# Patient Record
Sex: MALE | Race: WHITE | Employment: FULL TIME | URBAN - NONMETROPOLITAN AREA
[De-identification: names, ages, dates, MRNs, and addresses within clinical notes are randomized per-mention and may not be internally consistent; named-entity substitution may affect disease eponyms.]

---

## 2017-09-06 ENCOUNTER — OFFICE VISIT (OUTPATIENT)
Dept: FAMILY MEDICINE | Facility: OTHER | Age: 47
End: 2017-09-06
Payer: COMMERCIAL

## 2017-09-06 VITALS
DIASTOLIC BLOOD PRESSURE: 90 MMHG | TEMPERATURE: 96.8 F | BODY MASS INDEX: 31.33 KG/M2 | SYSTOLIC BLOOD PRESSURE: 150 MMHG | HEART RATE: 94 BPM | HEIGHT: 75 IN | WEIGHT: 252 LBS | OXYGEN SATURATION: 98 %

## 2017-09-06 DIAGNOSIS — I10 BENIGN ESSENTIAL HYPERTENSION: ICD-10-CM

## 2017-09-06 DIAGNOSIS — L64.9 MALE PATTERN BALDNESS: Primary | ICD-10-CM

## 2017-09-06 DIAGNOSIS — F41.9 ANXIETY: ICD-10-CM

## 2017-09-06 PROCEDURE — 99203 OFFICE O/P NEW LOW 30 MIN: CPT | Performed by: INTERNAL MEDICINE

## 2017-09-06 RX ORDER — LORAZEPAM 0.5 MG/1
1 TABLET ORAL EVERY 8 HOURS PRN
Qty: 60 TABLET | Refills: 0 | Status: SHIPPED | OUTPATIENT
Start: 2017-09-06 | End: 2018-12-14

## 2017-09-06 RX ORDER — LISINOPRIL AND HYDROCHLOROTHIAZIDE 12.5; 2 MG/1; MG/1
1 TABLET ORAL DAILY
Qty: 30 TABLET | Refills: 3 | Status: SHIPPED | OUTPATIENT
Start: 2017-09-06 | End: 2018-03-20

## 2017-09-06 RX ORDER — FINASTERIDE 5 MG/1
1.25 TABLET, FILM COATED ORAL DAILY
COMMUNITY
End: 2017-09-06

## 2017-09-06 RX ORDER — FINASTERIDE 5 MG/1
TABLET, FILM COATED ORAL
Qty: 30 TABLET | Refills: 0 | Status: SHIPPED | OUTPATIENT
Start: 2017-09-06 | End: 2018-06-11

## 2017-09-06 RX ORDER — LISINOPRIL AND HYDROCHLOROTHIAZIDE 12.5; 2 MG/1; MG/1
1 TABLET ORAL DAILY
COMMUNITY
End: 2017-09-06

## 2017-09-06 ASSESSMENT — ANXIETY QUESTIONNAIRES
7. FEELING AFRAID AS IF SOMETHING AWFUL MIGHT HAPPEN: SEVERAL DAYS
2. NOT BEING ABLE TO STOP OR CONTROL WORRYING: SEVERAL DAYS
1. FEELING NERVOUS, ANXIOUS, OR ON EDGE: SEVERAL DAYS
6. BECOMING EASILY ANNOYED OR IRRITABLE: NOT AT ALL
3. WORRYING TOO MUCH ABOUT DIFFERENT THINGS: SEVERAL DAYS
IF YOU CHECKED OFF ANY PROBLEMS ON THIS QUESTIONNAIRE, HOW DIFFICULT HAVE THESE PROBLEMS MADE IT FOR YOU TO DO YOUR WORK, TAKE CARE OF THINGS AT HOME, OR GET ALONG WITH OTHER PEOPLE: SOMEWHAT DIFFICULT
GAD7 TOTAL SCORE: 4
5. BEING SO RESTLESS THAT IT IS HARD TO SIT STILL: NOT AT ALL

## 2017-09-06 ASSESSMENT — PAIN SCALES - GENERAL: PAINLEVEL: NO PAIN (0)

## 2017-09-06 ASSESSMENT — PATIENT HEALTH QUESTIONNAIRE - PHQ9: 5. POOR APPETITE OR OVEREATING: NOT AT ALL

## 2017-09-06 NOTE — MR AVS SNAPSHOT
"              After Visit Summary   2017    Victor M Mao    MRN: [id number]           Patient Information     Date Of Birth          1970        Visit Information        Provider Department      2017 1:20 PM Lee Chowdhury DO [facility]        Today's Diagnoses     Male pattern baldness    -  1    Benign essential hypertension        Anxiety           Follow-ups after your visit        Who to contact     If you have questions or need follow up information about today's clinic visit or your schedule please contact [facility] directly at [phone].  Normal or non-critical lab and imaging results will be communicated to you by Shakehart, letter or phone within 4 business days after the clinic has received the results. If you do not hear from us within 7 days, please contact the clinic through Stemt or phone. If you have a critical or abnormal lab result, we will notify you by phone as soon as possible.  Submit refill requests through [employer] or call your pharmacy and they will forward the refill request to us. Please allow 3 business days for your refill to be completed.          Additional Information About Your Visit        MyChart Information     [employer] lets you send messages to your doctor, view your test results, renew your prescriptions, schedule appointments and more. To sign up, go to [email or web address] . Click on \"Log in\" on the left side of the screen, which will take you to the Welcome page. Then click on \"Sign up Now\" on the right side of the page.     You will be asked to enter the access code listed below, as well as some personal information. Please follow the directions to create your username and password.     Your access code is: [email or web address]  Expires: 2017 10:29 PM     Your access code will  in 90 days. If you need help or a new code, please call your [place] clinic or [phone].        Care EveryWhere ID     This is your " "Care EveryWhere ID. This could be used by other organizations to access your [facility] medical records  [id number]        Your Vitals Were     Pulse Temperature Height Pulse Oximetry BMI (Body Mass Index)       94 96.8  F (36  C) (Temporal) 6' 3\" (1.905 m) 98% 31.5 kg/m2        Blood Pressure from Last 3 Encounters:   09/06/17 150/90    Weight from Last 3 Encounters:   09/06/17 252 lb (114.3 kg)              Today, you had the following     No orders found for display         Today's Medication Changes          These changes are accurate as of: 9/6/17 11:59 PM.  If you have any questions, ask your nurse or doctor.               These medicines have changed or have updated prescriptions.        Dose/Directions    finasteride 5 MG tablet   Commonly known as:  PROSCAR   This may have changed:    - how much to take  - how to take this  - when to take this  - additional instructions   Used for:  Male pattern baldness   Changed by:  Lee Chowdhury DO        Take 1/4 pill daily   Quantity:  30 tablet   Refills:  0       LORazepam 0.5 MG tablet   Commonly known as:  ATIVAN   Indication:  Feeling Anxious   This may have changed:  medication strength   Used for:  Anxiety   Changed by:  Lee Chowdhury DO        Dose:  1 mg   Take 2 tablets (1 mg) by mouth every 8 hours as needed for anxiety   Quantity:  60 tablet   Refills:  0            Where to get your medicines      These medications were sent to [id number] - [facility], [place] - [address] [facility]  [address] [facility] [address]     Phone:  [phone]     finasteride 5 MG tablet    lisinopril-hydrochlorothiazide 20-12.5 MG per tablet         Some of these will need a paper prescription and others can be bought over the counter.  Ask your nurse if you have questions.     Bring a paper prescription for each of these medications     LORazepam 0.5 MG tablet                Primary Care Provider    None Specified       No primary provider on " file.        Equal Access to Services     [facility] : Hadii aad ku hadruthamos Alexandraali, waeddieda forrestrebaha, qajenniferjaneth goodenvivianasimba cormier, brian washburn. So [place] [phone].    ATENCIÓN: Si habla español, tiene a zee disposición servicios gratuitos de asistencia lingüística. Negritoame al [phone].    We comply with applicable federal civil rights laws and [place] laws. We do not discriminate on the basis of race, color, national origin, age, disability sex, sexual orientation or gender identity.            Thank you!     Thank you for choosing [facility]  for your care. Our goal is always to provide you with excellent care. Hearing back from our patients is one way we can continue to improve our services. Please take a few minutes to complete the written survey that you may receive in the mail after your visit with us. Thank you!             Your Updated Medication List - Protect others around you: Learn how to safely use, store and throw away your medicines at www.disposemymeds.org.          This list is accurate as of: 9/6/17 11:59 PM.  Always use your most recent med list.                   Brand Name Dispense Instructions for use Diagnosis    CITALOPRAM HYDROBROMIDE PO      Take 40 mg by mouth daily        finasteride 5 MG tablet    PROSCAR    30 tablet    Take 1/4 pill daily    Male pattern baldness       lisinopril-hydrochlorothiazide 20-12.5 MG per tablet    PRINZIDE/ZESTORETIC    30 tablet    Take 1 tablet by mouth daily    Benign essential hypertension       LORazepam 0.5 MG tablet    ATIVAN    60 tablet    Take 2 tablets (1 mg) by mouth every 8 hours as needed for anxiety    Anxiety

## 2017-09-06 NOTE — NURSING NOTE
"Chief Complaint   Patient presents with     Establish Care       Initial /90 (BP Location: Left arm, Patient Position: Chair, Cuff Size: Adult Regular)  Pulse 94  Temp 96.8  F (36  C) (Temporal)  Ht 6' 3\" (1.905 m)  Wt 252 lb (114.3 kg)  SpO2 98%  BMI 31.5 kg/m2 Estimated body mass index is 31.5 kg/(m^2) as calculated from the following:    Height as of this encounter: 6' 3\" (1.905 m).    Weight as of this encounter: 252 lb (114.3 kg).  Medication Reconciliation: complete  Peggy MELGRA    "
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
## 2017-09-07 ASSESSMENT — ANXIETY QUESTIONNAIRES: GAD7 TOTAL SCORE: 4

## 2017-10-30 PROBLEM — F41.9 ANXIETY: Status: ACTIVE | Noted: 2017-10-30

## 2017-10-30 PROBLEM — I10 BENIGN ESSENTIAL HYPERTENSION: Status: ACTIVE | Noted: 2017-10-30

## 2017-12-27 DIAGNOSIS — F33.41 RECURRENT MAJOR DEPRESSIVE DISORDER, IN PARTIAL REMISSION (H): Primary | ICD-10-CM

## 2017-12-27 RX ORDER — CITALOPRAM HYDROBROMIDE 10 MG/1
40 TABLET ORAL DAILY
Qty: 60 TABLET | Refills: 0 | Status: CANCELLED | OUTPATIENT
Start: 2017-12-27

## 2017-12-27 NOTE — TELEPHONE ENCOUNTER
Requested Prescriptions   Pending Prescriptions Disp Refills     citalopram (CELEXA) 10 MG tablet 60 tablet 0     Sig: Take 4 tablets (40 mg) by mouth daily    SSRIs Protocol Passed    12/27/2017  3:19 PM       Passed - Recent or future visit with authorizing provider    Patient had office visit in the last year or has a visit in the next 30 days with authorizing provider.  See chart review.              Passed - Patient is age 18 or older        ** This has never been prescribed for pt at a  clinic, it was a reported medication**

## 2017-12-28 RX ORDER — CITALOPRAM HYDROBROMIDE 40 MG/1
40 TABLET ORAL DAILY
Qty: 30 TABLET | Refills: 1 | Status: SHIPPED | OUTPATIENT
Start: 2017-12-28 | End: 2018-05-07

## 2017-12-28 NOTE — TELEPHONE ENCOUNTER
Routing refill request to provider for review/approval because:  Medication is reported/historical    Anne Crawley, RN  [facility]

## 2018-01-05 ENCOUNTER — OFFICE VISIT (OUTPATIENT)
Dept: FAMILY MEDICINE | Facility: CLINIC | Age: 48
End: 2018-01-05
Payer: COMMERCIAL

## 2018-01-05 VITALS
SYSTOLIC BLOOD PRESSURE: 138 MMHG | HEART RATE: 78 BPM | TEMPERATURE: 98.2 F | OXYGEN SATURATION: 100 % | BODY MASS INDEX: 33.61 KG/M2 | WEIGHT: 268.9 LBS | DIASTOLIC BLOOD PRESSURE: 86 MMHG

## 2018-01-05 DIAGNOSIS — B34.9 VIRAL SYNDROME: ICD-10-CM

## 2018-01-05 DIAGNOSIS — R11.2 NON-INTRACTABLE VOMITING WITH NAUSEA, UNSPECIFIED VOMITING TYPE: Primary | ICD-10-CM

## 2018-01-05 DIAGNOSIS — R05.9 COUGH: ICD-10-CM

## 2018-01-05 LAB
BASOPHILS # BLD AUTO: 0 10E9/L (ref 0–0.2)
BASOPHILS NFR BLD AUTO: 0.4 %
DIFFERENTIAL METHOD BLD: NORMAL
EOSINOPHIL # BLD AUTO: 0.1 10E9/L (ref 0–0.7)
EOSINOPHIL NFR BLD AUTO: 1.4 %
FLUAV+FLUBV AG SPEC QL: NEGATIVE
FLUAV+FLUBV AG SPEC QL: NEGATIVE
IMM GRANULOCYTES # BLD: 0 10E9/L (ref 0–0.4)
IMM GRANULOCYTES NFR BLD: 0.4 %
LYMPHOCYTES # BLD AUTO: 1.8 10E9/L (ref 0.8–5.3)
LYMPHOCYTES NFR BLD AUTO: 36.8 %
MONOCYTES # BLD AUTO: 0.4 10E9/L (ref 0–1.3)
MONOCYTES NFR BLD AUTO: 8.9 %
NEUTROPHILS # BLD AUTO: 2.6 10E9/L (ref 1.6–8.3)
NEUTROPHILS NFR BLD AUTO: 52.1 %
SPECIMEN SOURCE: NORMAL
WBC # BLD AUTO: 4.9 10E9/L (ref 4–11)

## 2018-01-05 PROCEDURE — 85004 AUTOMATED DIFF WBC COUNT: CPT | Performed by: NURSE PRACTITIONER

## 2018-01-05 PROCEDURE — 99213 OFFICE O/P EST LOW 20 MIN: CPT | Performed by: NURSE PRACTITIONER

## 2018-01-05 PROCEDURE — 85048 AUTOMATED LEUKOCYTE COUNT: CPT | Performed by: NURSE PRACTITIONER

## 2018-01-05 PROCEDURE — 36415 COLL VENOUS BLD VENIPUNCTURE: CPT | Performed by: NURSE PRACTITIONER

## 2018-01-05 PROCEDURE — 87804 INFLUENZA ASSAY W/OPTIC: CPT | Performed by: NURSE PRACTITIONER

## 2018-01-05 NOTE — MR AVS SNAPSHOT
"              After Visit Summary   1/5/2018    Victor M Mao    MRN: [id number]           Patient Information     Date Of Birth          1970        Visit Information        Provider Department      1/5/2018 2:00 PM Lucia Floyd APRN [facility]        Today's Diagnoses     Non-intractable vomiting with nausea, unspecified vomiting type    -  1    Cough        Viral syndrome          Care Instructions      Viral Syndrome (Adult)  A viral illness may cause a number of symptoms. The symptoms depend on the part of the body that the virus affects. If it settles in your nose, throat, and lungs, it may cause cough, sore throat, congestion, and sometimes headache. If it settles in your stomach and intestinal tract, it may cause vomiting and diarrhea. Sometimes it causes vague symptoms like \"aching all over,\" feeling tired, loss of appetite, or fever.  A viral illness usually lasts 1 to 2 weeks, but sometimes it lasts longer. In some cases, a more serious infection can look like a viral syndrome in the first few days of the illness. You may need another exam and additional tests to know the difference. Watch for the warning signs listed below.  Home care  Follow these guidelines for taking care of yourself at home:    If symptoms are severe, rest at home for the first 2 to 3 days.    Stay away from cigarette smoke - both your smoke and the smoke from others.    You may use over-the-counter acetaminophen or ibuprofen for fever, muscle aching, and headache, unless another medicine was prescribed for this. If you have chronic liver or kidney disease or ever had a stomach ulcer or GI bleeding, talk with your doctor before using these medicines. No one who is younger than 18 and ill with a fever should take aspirin. It may cause severe disease or death.    Your appetite may be poor, so a light diet is fine. Avoid dehydration by drinking 8 to 12 8-ounce glasses of fluids each day. This may include " water; orange juice; lemonade; apple, grape, and cranberry juice; clear fruit drinks; electrolyte replacement and sports drinks; and decaffeinated teas and coffee. If you have been diagnosed with a kidney disease, ask your doctor how much and what types of fluids you should drink to prevent dehydration. If you have kidney disease, drinking too much fluid can cause it build up in the your body and be dangerous to your health.    Over-the-counter remedies won't shorten the length of the illness but may be helpful for cough, sore throat; and nasal and sinus congestion. Don't use decongestants if you have high blood pressure.  Follow-up care  Follow up with your healthcare provider if you do not improve over the next week.  Call 911  Get emergency medical care if any of the following occur:    Convulsion    Feeling weak, dizzy, or like you are going to faint    Chest pain, shortness of breath, wheezing, or difficulty breathing  When to seek medical advice  Call your healthcare provider right away if any of these occur:    Cough with lots of colored sputum (mucus) or blood in your sputum    Chest pain, shortness of breath, wheezing, or difficulty breathing    Severe headache; face, neck, or ear pain    Severe, constant pain in the lower right side of your belly (abdominal)    Continued vomiting (can t keep liquids down)    Frequent diarrhea (more than 5 times a day); blood (red or black color) or mucus in diarrhea    Feeling weak, dizzy, or like you are going to faint    Extreme thirst    Fever of 100.4 F (38 C) or higher, or as directed by your healthcare provider  Date Last Reviewed: 9/25/2015 2000-2017 [employer]. [address] [place] [address], [place] [address]. All rights reserved. This information is not intended as a substitute for professional medical care. Always follow your healthcare professional's instructions.                Follow-ups after your visit        Who to contact     If you have  "questions or need follow up information about today's clinic visit or your schedule please contact [facility] directly at [phone].  Normal or non-critical lab and imaging results will be communicated to you by Mobile [employer] Casehart, letter or phone within 4 business days after the clinic has received the results. If you do not hear from us within 7 days, please contact the clinic through [employer] Case[employer] or phone. If you have a critical or abnormal lab result, we will notify you by phone as soon as possible.  Submit refill requests through [employer] or call your pharmacy and they will forward the refill request to us. Please allow 3 business days for your refill to be completed.          Additional Information About Your Visit        [employer] Information     [employer] lets you send messages to your doctor, view your test results, renew your prescriptions, schedule appointments and more. To sign up, go to [email or web address] . Click on \"Log in\" on the left side of the screen, which will take you to the Welcome page. Then click on \"Sign up Now\" on the right side of the page.     You will be asked to enter the access code listed below, as well as some personal information. Please follow the directions to create your username and password.     Your access code is: [email or web address]  Expires: 2018  2:38 PM     Your access code will  in 90 days. If you need help or a new code, please call your [facility] clinic or [phone].        Care EveryWhere ID     This is your Care EveryWhere ID. This could be used by other organizations to access your [facility] medical records  [id number]        Your Vitals Were     Pulse Temperature Pulse Oximetry BMI (Body Mass Index)          78 98.2  F (36.8  C) (Tympanic) 100% 33.61 kg/m2         Blood Pressure from Last 3 Encounters:   18 138/86   17 150/90    Weight from Last 3 Encounters:   18 268 lb 14.4 oz (122 kg)   17 252 lb (114.3 kg)              We " Performed the Following     Influenza A/B antigen     WBC with Diff        Primary Care Provider Fax #    Physician No Ref-Primary [phone]       No address on file        Equal Access to Services     JOHNNA QUIÑONES : Hadii aad ku hadruthamos Sauer, siminsimba claytonrebaha, adilene cormier, brian goodrich laalexandriajanis washburn. So [facility] [phone].    ATENCIÓN: Si habla español, tiene a zee disposición servicios gratuitos de asistencia lingüística. Llame al [phone].    We comply with applicable federal civil rights laws and [place] laws. We do not discriminate on the basis of race, color, national origin, age, disability, sex, sexual orientation, or gender identity.            Thank you!     Thank you for choosing [facility]  for your care. Our goal is always to provide you with excellent care. Hearing back from our patients is one way we can continue to improve our services. Please take a few minutes to complete the written survey that you may receive in the mail after your visit with us. Thank you!             Your Updated Medication List - Protect others around you: Learn how to safely use, store and throw away your medicines at www.disposemymeds.org.          This list is accurate as of: 1/5/18  2:38 PM.  Always use your most recent med list.                   Brand Name Dispense Instructions for use Diagnosis    citalopram 40 MG tablet    celeXA    30 tablet    Take 1 tablet (40 mg) by mouth daily    Recurrent major depressive disorder, in partial remission (H)       finasteride 5 MG tablet    PROSCAR    30 tablet    Take 1/4 pill daily    Male pattern baldness       lisinopril-hydrochlorothiazide 20-12.5 MG per tablet    PRINZIDE/ZESTORETIC    30 tablet    Take 1 tablet by mouth daily    Benign essential hypertension       LORazepam 0.5 MG tablet    ATIVAN    60 tablet    Take 2 tablets (1 mg) by mouth every 8 hours as needed for anxiety    Anxiety

## 2018-01-05 NOTE — PROGRESS NOTES
SUBJECTIVE:   Victor M Mao is a 47 year old male who presents to clinic today for the following health issues:      Acute Illness   Acute illness concerns: uri  Onset: 5 days    Fever: YES- early on    Chills/Sweats: YES    Headache (location?): YES    Sinus Pressure:YES    Conjunctivitis:  no    Ear Pain: no    Rhinorrhea: YES    Congestion: no    Sore Throat: YES     Cough: YES-non-productive    Wheeze: YES    Decreased Appetite: YES    Nausea: YES    Vomiting: YES    Diarrhea:  YES    Dysuria/Freq.: no    Fatigue/Achiness: YES    Sick/Strep Exposure: YES- work     Therapies Tried and outcome: pepto helps a bit, gas x helps a little, theraflu        The patient reports symptoms as above, the began about 5 days ago.  He initially had a fever, but has been afebrile since.  He has been taking a lot of Pepto-Bismol, states his diarrhea had resolved.  He did feel a little better yesterday, but again this morning was very achy and weak, had recurrence of the nausea.  He has not eaten any solid food today, but is keeping fluids down.  He states his abdomen feels bloated, he has cough, chills, generalized achiness and weakness.  He states there were couple days that he could barely move.  He works at the [employer] in the [email or web address] department.    Problem list and histories reviewed & adjusted, as indicated.  Additional history: as documented    BP Readings from Last 3 Encounters:   01/05/18 138/86   09/06/17 150/90    Wt Readings from Last 3 Encounters:   01/05/18 268 lb 14.4 oz (122 kg)   09/06/17 252 lb (114.3 kg)                      Reviewed and updated as needed this visit by clinical staff  Tobacco  Allergies  Meds  Med Hx  Surg Hx  Fam Hx  Soc Hx      Reviewed and updated as needed this visit by Provider         ROS:  Constitutional, HEENT, cardiovascular, pulmonary, GI, , musculoskeletal, neuro, skin, endocrine and psych systems are negative, except as otherwise noted.      OBJECTIVE:   /86  Pulse 78   Temp 98.2  F (36.8  C) (Tympanic)  Wt 268 lb 14.4 oz (122 kg)  SpO2 100%  BMI 33.61 kg/m2  Body mass index is 33.61 kg/(m^2).   GENERAL: Well-nourished, well hydrated.  Patient appears ill, no acute distress  EYES: Eyes grossly normal to inspection, PERRL and conjunctivae and sclerae normal  HENT: ear canals and TM's normal, nose and mouth without ulcers or lesions  NECK: no adenopathy, no asymmetry, masses, or scars and thyroid normal to palpation  RESP: lungs clear to auscultation - no rales, rhonchi or wheezes  CV: regular rate and rhythm, normal S1 S2, no S3 or S4, no murmur, click or rub, no peripheral edema and peripheral pulses strong  ABDOMEN: Soft, nondistended.  Bowel sounds present in all quadrants.  No masses, no organomegaly palpated.  Mild tenderness to palpation in the epigastrium  MS: no gross musculoskeletal defects noted, no edema  SKIN: no suspicious lesions or rashes  NEURO: Normal strength and tone, mentation intact and speech normal    Diagnostic Test Results:  Results for orders placed or performed in visit on 01/05/18 (from the past 24 hour(s))   Influenza A/B antigen   Result Value Ref Range    Influenza A/B Agn Specimen Nasal     Influenza A Negative NEG^Negative    Influenza B Negative NEG^Negative   WBC with Diff   Result Value Ref Range    WBC 4.9 4.0 - 11.0 10e9/L    Diff Method Automated Method     % Neutrophils 52.1 %    % Lymphocytes 36.8 %    % Monocytes 8.9 %    % Eosinophils 1.4 %    % Basophils 0.4 %    % Immature Granulocytes 0.4 %    Absolute Neutrophil 2.6 1.6 - 8.3 10e9/L    Absolute Lymphocytes 1.8 0.8 - 5.3 10e9/L    Absolute Monocytes 0.4 0.0 - 1.3 10e9/L    Absolute Eosinophils 0.1 0.0 - 0.7 10e9/L    Absolute Basophils 0.0 0.0 - 0.2 10e9/L    Abs Immature Granulocytes 0.0 0 - 0.4 10e9/L       ASSESSMENT/PLAN:     Problem List Items Addressed This Visit     None      Visit Diagnoses     Non-intractable vomiting with nausea, unspecified vomiting type    -  Primary     Relevant Orders    Influenza A/B antigen (Completed)    WBC with Diff (Completed)    Cough        Relevant Orders    Influenza A/B antigen (Completed)    WBC with Diff (Completed)    Viral syndrome             Symptomatic and supportive measures.  Patient was provided with written information regarding the anticipated course of viral illness.  Ensure adequate oral fluids.  Stick to clear liquids until nausea has resolved, then gradually progressed to a bland diet.  Rest, humidification.  Advised to stay home from work this weekend.  Follow-up in clinic if not improving           DAHLIA Guevara [facility]

## 2018-01-05 NOTE — LETTER
[address] [address]  Phone: [phone]  Fax: [phone]    January 5, 2018        Victor M Mao  [address] [place] [id number]          To whom it may concern:    RE: Victor M Mao    Patient was seen and treated today at our clinic.  He is advised to stay home from work over the weekend unless he is feeling much improved tomorrow morning.    Please contact me for questions or concerns.      Sincerely,        DAHLIA Guevara CNP

## 2018-01-05 NOTE — PATIENT INSTRUCTIONS

## 2018-03-20 DIAGNOSIS — I10 BENIGN ESSENTIAL HYPERTENSION: ICD-10-CM

## 2018-03-20 RX ORDER — LISINOPRIL AND HYDROCHLOROTHIAZIDE 12.5; 2 MG/1; MG/1
1 TABLET ORAL DAILY
Qty: 30 TABLET | Refills: 3 | Status: SHIPPED | OUTPATIENT
Start: 2018-03-20 | End: 2018-10-16

## 2018-03-20 NOTE — TELEPHONE ENCOUNTER
Routing refill request to provider for review/approval because:  Labs not current:  There is no BMP or creatinine on file  A break in medication - #30x3 refilled in September.     Bonnie Hsu RN

## 2018-03-20 NOTE — TELEPHONE ENCOUNTER
"Requested Prescriptions   Pending Prescriptions Disp Refills     lisinopril-hydrochlorothiazide (PRINZIDE/ZESTORETIC) 20-12.5 MG per tablet 30 tablet 3     Sig: Take 1 tablet by mouth daily    Diuretics (Including Combos) Protocol Failed    3/20/2018  8:21 AM       Failed - Recent (12 mo) or future (30 days) visit within the authorizing provider's specialty    Patient had office visit in the last 12 months or has a visit in the next 30 days with authorizing provider or within the authorizing provider's specialty.  See \"Patient Info\" tab in inbasket, or \"Choose Columns\" in Meds & Orders section of the refill encounter.           Failed - Normal serum creatinine on file in past 12 months    No lab results found.          Failed - Normal serum potassium on file in past 12 months    No lab results found.                Failed - Normal serum sodium on file in past 12 months    No lab results found.          Passed - Blood pressure under 140/90 in past 12 months    BP Readings from Last 3 Encounters:   01/05/18 138/86   09/06/17 150/90                Passed - Patient is age 18 or older        Last Written Prescription Date:  9/6/17  Last Fill Quantity: 30,  # refills: 3   Last office visit: 1/5/2018 with prescribing provider:     Future Office Visit:      "

## 2018-05-07 DIAGNOSIS — F33.41 RECURRENT MAJOR DEPRESSIVE DISORDER, IN PARTIAL REMISSION (H): ICD-10-CM

## 2018-05-07 NOTE — TELEPHONE ENCOUNTER
"Last Written Prescription Date:  12/28/17  Last Fill Quantity: 30,  # refills: 1   Last office visit: No previous visit found with prescribing provider:  No PCP   Future Office Visit:    Requested Prescriptions   Pending Prescriptions Disp Refills     citalopram (CELEXA) 40 MG tablet 30 tablet 1     Sig: Take 1 tablet (40 mg) by mouth daily    SSRIs Protocol Failed    5/7/2018 12:53 PM       Failed - PHQ-9 score less than 5 in past 6 months    Please review last PHQ-9 score.          Failed - Recent (6 mo) or future (30 days) visit within the authorizing provider's specialty    Patient had office visit in the last 6 months or has a visit in the next 30 days with authorizing provider or within the authorizing provider's specialty.  See \"Patient Info\" tab in inbasket, or \"Choose Columns\" in Meds & Orders section of the refill encounter.           Passed - Patient is age 18 or older        Julieta Jasso MA  "

## 2018-05-08 RX ORDER — CITALOPRAM HYDROBROMIDE 40 MG/1
40 TABLET ORAL DAILY
Qty: 30 TABLET | Refills: 0 | Status: SHIPPED | OUTPATIENT
Start: 2018-05-08 | End: 2018-11-27

## 2018-05-08 NOTE — TELEPHONE ENCOUNTER
Routing refill request to provider for review/approval because:  A break in medication, should have been out of refills 3 months ago  Patient needs to be seen because:  Due for follow up  No PHQ9 on file    Anne Crawley RN  [facility] [place]

## 2018-06-11 DIAGNOSIS — L64.9 MALE PATTERN BALDNESS: ICD-10-CM

## 2018-06-11 NOTE — TELEPHONE ENCOUNTER
"Requested Prescriptions   Pending Prescriptions Disp Refills     finasteride (PROSCAR) 5 MG tablet 30 tablet 0     Sig: Take 1/4 pill daily    Alpha Blockers Passed    6/11/2018 11:07 AM       Passed - Blood pressure under 140/90 in past 12 months    BP Readings from Last 3 Encounters:   01/05/18 138/86   09/06/17 150/90                Passed - Recent (12 mo) or future (30 days) visit within the authorizing provider's specialty    Patient had office visit in the last 12 months or has a visit in the next 30 days with authorizing provider or within the authorizing provider's specialty.  See \"Patient Info\" tab in inbasket, or \"Choose Columns\" in Meds & Orders section of the refill encounter.           Passed - Patient does not have Tadalafil, Vardenafil, or Sildenafil on their medication list       Passed - Patient is 18 years of age or older          Last Written Prescription Date:  9-6-17  Last Fill Quantity: 30,  # refills: 0   Last Office Visit with [facility] prescribing provider:  1/5/18   Future Office Visit:       "

## 2018-06-12 RX ORDER — FINASTERIDE 5 MG/1
TABLET, FILM COATED ORAL
Qty: 30 TABLET | Refills: 0 | Status: SHIPPED | OUTPATIENT
Start: 2018-06-12 | End: 2018-12-14

## 2018-06-12 NOTE — TELEPHONE ENCOUNTER
Prescription approved per [facility] Refill Protocol.  America Reed RN

## 2018-06-22 ENCOUNTER — RADIANT APPOINTMENT (OUTPATIENT)
Dept: GENERAL RADIOLOGY | Facility: OTHER | Age: 48
End: 2018-06-22
Attending: INTERNAL MEDICINE
Payer: COMMERCIAL

## 2018-06-22 ENCOUNTER — OFFICE VISIT (OUTPATIENT)
Dept: FAMILY MEDICINE | Facility: OTHER | Age: 48
End: 2018-06-22
Payer: COMMERCIAL

## 2018-06-22 VITALS
SYSTOLIC BLOOD PRESSURE: 140 MMHG | OXYGEN SATURATION: 100 % | WEIGHT: 278.5 LBS | TEMPERATURE: 96.2 F | HEART RATE: 87 BPM | BODY MASS INDEX: 34.81 KG/M2 | DIASTOLIC BLOOD PRESSURE: 80 MMHG

## 2018-06-22 DIAGNOSIS — J20.8 ACUTE BRONCHITIS DUE TO OTHER SPECIFIED ORGANISMS: Primary | ICD-10-CM

## 2018-06-22 DIAGNOSIS — J20.8 ACUTE BRONCHITIS DUE TO OTHER SPECIFIED ORGANISMS: ICD-10-CM

## 2018-06-22 DIAGNOSIS — F41.9 ANXIETY: ICD-10-CM

## 2018-06-22 DIAGNOSIS — I10 BENIGN ESSENTIAL HYPERTENSION: ICD-10-CM

## 2018-06-22 PROCEDURE — 99214 OFFICE O/P EST MOD 30 MIN: CPT | Performed by: INTERNAL MEDICINE

## 2018-06-22 PROCEDURE — 71046 X-RAY EXAM CHEST 2 VIEWS: CPT | Mod: FY

## 2018-06-22 RX ORDER — AZITHROMYCIN 250 MG/1
TABLET, FILM COATED ORAL
Qty: 6 TABLET | Refills: 1 | Status: SHIPPED | OUTPATIENT
Start: 2018-06-22 | End: 2018-11-29

## 2018-06-22 ASSESSMENT — PAIN SCALES - GENERAL: PAINLEVEL: MILD PAIN (2)

## 2018-06-22 NOTE — MR AVS SNAPSHOT
"              After Visit Summary   2018    Victor M Mao    MRN: [id number]           Patient Information     Date Of Birth          1970        Visit Information        Provider Department      2018 9:00 AM Lee Chowdhury DO [facility]        Today's Diagnoses     Acute bronchitis due to other specified organisms    -  1    Anxiety        Benign essential hypertension           Follow-ups after your visit        Who to contact     If you have questions or need follow up information about today's clinic visit or your schedule please contact [facility] directly at [phone].  Normal or non-critical lab and imaging results will be communicated to you by [employer], letter or phone within 4 business days after the clinic has received the results. If you do not hear from us within 7 days, please contact the clinic through [employer] or phone. If you have a critical or abnormal lab result, we will notify you by phone as soon as possible.  Submit refill requests through [employer] or call your pharmacy and they will forward the refill request to us. Please allow 3 business days for your refill to be completed.          Additional Information About Your Visit        MyChart Information     [employer] lets you send messages to your doctor, view your test results, renew your prescriptions, schedule appointments and more. To sign up, go to [email or web address] . Click on \"Log in\" on the left side of the screen, which will take you to the Welcome page. Then click on \"Sign up Now\" on the right side of the page.     You will be asked to enter the access code listed below, as well as some personal information. Please follow the directions to create your username and password.     Your access code is: [email or web address]  Expires: 2018  9:26 AM     Your access code will  in 90 days. If you need help or a new code, please call your [facility] or [phone].        Care " EveryWhere ID     This is your Care EveryWhere ID. This could be used by other organizations to access your [facility] medical records  [id number]        Your Vitals Were     Pulse Temperature Pulse Oximetry BMI (Body Mass Index)          87 96.2  F (35.7  C) (Temporal) 100% 34.81 kg/m2         Blood Pressure from Last 3 Encounters:   06/22/18 140/80   01/05/18 138/86   09/06/17 150/90    Weight from Last 3 Encounters:   06/22/18 278 lb 8 oz (126.3 kg)   01/05/18 268 lb 14.4 oz (122 kg)   09/06/17 252 lb (114.3 kg)                 Today's Medication Changes          These changes are accurate as of 6/22/18  9:52 AM.  If you have any questions, ask your nurse or doctor.               Start taking these medicines.        Dose/Directions    azithromycin 250 MG tablet   Commonly known as:  ZITHROMAX   Used for:  Acute bronchitis due to other specified organisms   Started by:  Lee Chowdhury, DO        Two tablets first day, then one tablet daily for four days.   Quantity:  6 tablet   Refills:  1            Where to get your medicines      These medications were sent to [facility] - [place] - [address] [facility]  [address], [place] [address]     Phone:  [phone]     azithromycin 250 MG tablet                Primary Care Provider Office Phone # Fax #    Lee Chowdhury [phone]       [address] [facility] [address]        Equal Access to Services     JOHNNA QUIÑONES AH: Hadii milagro limono Sodevonte, waaxda luqadaha, qaybta kaalmada adeegyada, waxgal yuli washburn. So [facility] [phone].    ATENCIÓN: Si habla español, tiene a zee disposición servicios gratuitos de asistencia lingüística. Llame al [phone].    We comply with applicable federal civil rights laws and [place] laws. We do not discriminate on the basis of race, color, national origin, age, disability, sex, sexual orientation, or gender identity.            Thank you!     Thank  you for choosing [facility]  for your care. Our goal is always to provide you with excellent care. Hearing back from our patients is one way we can continue to improve our services. Please take a few minutes to complete the written survey that you may receive in the mail after your visit with us. Thank you!             Your Updated Medication List - Protect others around you: Learn how to safely use, store and throw away your medicines at www.disposemymeds.org.          This list is accurate as of 6/22/18  9:52 AM.  Always use your most recent med list.                   Brand Name Dispense Instructions for use Diagnosis    azithromycin 250 MG tablet    ZITHROMAX    6 tablet    Two tablets first day, then one tablet daily for four days.    Acute bronchitis due to other specified organisms       citalopram 40 MG tablet    celeXA    30 tablet    Take 1 tablet (40 mg) by mouth daily    Recurrent major depressive disorder, in partial remission (H)       finasteride 5 MG tablet    PROSCAR    30 tablet    Take 1/4 pill daily    Male pattern baldness       lisinopril-hydrochlorothiazide 20-12.5 MG per tablet    PRINZIDE/ZESTORETIC    30 tablet    Take 1 tablet by mouth daily    Benign essential hypertension       LORazepam 0.5 MG tablet    ATIVAN    60 tablet    Take 2 tablets (1 mg) by mouth every 8 hours as needed for anxiety    Anxiety

## 2018-06-22 NOTE — PROGRESS NOTES
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  He notes that for the last 2 months, he has had a cough, excess sputum production has been sometimes yellow and thick.  He has a sensation of some discomfort in his chest when he takes a deep breath and senses a rattle.  He has had dyspnea with exertion.  He also has some sore throat and ear discomfort which is recently.  He notes he is able to take food and fluid adequately but it is slightly uncomfortable.                       PAST, FAMILY,SOCIAL HISTORY:     Medical  History:   has no past medical history on file.     Surgical History:   has no past surgical history on file.     Social History:   reports that he has never smoked. He has never used smokeless tobacco. He reports that he drinks alcohol. He reports that he does not use illicit drugs.     Family History:  family history is not on file.            MEDICATIONS  Current Outpatient Prescriptions   Medication Sig Dispense Refill     azithromycin (ZITHROMAX) 250 MG tablet Two tablets first day, then one tablet daily for four days. 6 tablet 1     citalopram (CELEXA) 40 MG tablet Take 1 tablet (40 mg) by mouth daily 30 tablet 0     finasteride (PROSCAR) 5 MG tablet Take 1/4 pill daily 30 tablet 0     lisinopril-hydrochlorothiazide (PRINZIDE/ZESTORETIC) 20-12.5 MG per tablet Take 1 tablet by mouth daily 30 tablet 3     LORazepam (ATIVAN) 0.5 MG tablet Take 2 tablets (1 mg) by mouth every 8  hours as needed for anxiety 60 tablet 0         --------------------------------------------------------------------------------------------------------------------                              Review of Systems     LUNGS: Pt denies:  hemoptysis, or shortness of breath.  He has a cough with productive sputum as described.   HEART: Pt denies: chest pain, arrythmia, syncope, tachy or bradyarrhythmia.   GI: Pt denies: nausea, vomitting, diarrhea, constipation, melena, or hematochezia.   NEURO: Pt denies: seizures, strokes, diplopia, weakness, paraesthesias, or paralysis.   PSYCH: The patient denies significant depression, anxiety, mood imbalance. Specifically denies any suicidal ideation.  His anxiety is markedly improved with use of the SSRI.                                     Examination      /80 (BP Location: Left arm, Patient Position: Chair, Cuff Size: Adult Regular)  Pulse 87  Temp 96.2  F (35.7  C) (Temporal)  Wt 278 lb 8 oz (126.3 kg)  SpO2 100%  BMI 34.81 kg/m2   Constitutional: The patient appears to be in no acute distress. The patient appears to be adequately hydrated. No acute respiratory or hemodynamic distress is noted at this time.   LUNGS: Expiratory rhonchi are heard.  Bilaterally, airflow is brisk, no intercostal retraction or stridor is noted. No coughing is noted during visit.   HEART:  regular without rubs, clicks, gallops, or murmurs. PMI is nondisplaced. Upstrokes are brisk. S1,S2 are heard.   NEURO: Pt is alert and appropriate. No neurologic lateralization is noted. Cranial nerves 2-12 are intact. Peripheral sensory and motor function are grossly normal.                ENT: no significant nasal obstruction, TM's are red and slightly Retracted, hearing intact bilaterally. No carotid bruits are heard. No JVD seen. Thyroid is not nodular or enlarged.   PSYCH: The patient appears grossly appropriate. Maintains good eye contact, does not have any jittery or atypical motion. Displays  appropriate affect.  His anxiety has improved significantly with use of the SSRI.                                          Decision Making    1. Acute bronchitis due to other specified organisms  Obtain chest x-ray to rule out pneumonia.  Given his sulfa allergy, would like to start azithromycin.    - XR Chest 2 Views; Future  - azithromycin (ZITHROMAX) 250 MG tablet; Two tablets first day, then one tablet daily for four days.  Dispense: 6 tablet; Refill: 1    2. Anxiety  Decrease citalopram to every other day while on Zithromax to avoid QT elongation  Continue alprazolam on an as-needed basis.  3. Benign essential hypertension  Continue Zestoretic at current dose.                            FOLLOW UP   I have asked the patient to make an appointment for followup with me in 1 week if not markedly improved.        I have carefully explained the diagnosis and treatment options to the patient.  The patient has displayed an understanding of the above, and all subsequent questions were answered.      DO JUAN Mas    Portions of this note were produced using [employer]  Although every attempt at real-time proof reading has been made, occasional grammar/syntax errors may have been missed.

## 2018-10-16 DIAGNOSIS — I10 BENIGN ESSENTIAL HYPERTENSION: ICD-10-CM

## 2018-10-18 RX ORDER — LISINOPRIL AND HYDROCHLOROTHIAZIDE 12.5; 2 MG/1; MG/1
TABLET ORAL
Qty: 30 TABLET | Refills: 3 | Status: SHIPPED | OUTPATIENT
Start: 2018-10-18 | End: 2018-12-14

## 2018-10-18 NOTE — TELEPHONE ENCOUNTER
"Routing refill request to provider for review/approval because:  A break in medication  No Pradhan RN, BSN    Prinizide  Last Written Prescription Date:  3/20/2018  Last Fill Quantity: 30,  # refills: 3   Last office visit: 6/22/2018 with prescribing provider:  6/22/2018   Future Office Visit:      Requested Prescriptions   Pending Prescriptions Disp Refills     lisinopril-hydrochlorothiazide (PRINZIDE/ZESTORETIC) 20-12.5 MG per tablet [Pharmacy Med Name: LISINOPRIL-HYDROCHLORO 20-12.5 TABS] 30 tablet 3     Sig: TAKE ONE TABLET BY MOUTH ONCE DAILY    Diuretics (Including Combos) Protocol Failed    10/16/2018  8:30 PM       Failed - Blood pressure under 140/90 in past 12 months    BP Readings from Last 3 Encounters:   06/22/18 140/80   01/05/18 138/86   09/06/17 150/90                Failed - Normal serum creatinine on file in past 12 months    No lab results found.          Failed - Normal serum potassium on file in past 12 months    No lab results found.                Failed - Normal serum sodium on file in past 12 months    No lab results found.          Passed - Recent (12 mo) or future (30 days) visit within the authorizing provider's specialty    Patient had office visit in the last 12 months or has a visit in the next 30 days with authorizing provider or within the authorizing provider's specialty.  See \"Patient Info\" tab in inbasket, or \"Choose Columns\" in Meds & Orders section of the refill encounter.             Passed - Patient is age 18 or older        No Pradhan RN on 10/18/2018 at 11:14 AM  "

## 2018-11-27 DIAGNOSIS — F33.41 RECURRENT MAJOR DEPRESSIVE DISORDER, IN PARTIAL REMISSION (H): ICD-10-CM

## 2018-11-29 RX ORDER — CITALOPRAM HYDROBROMIDE 40 MG/1
TABLET ORAL
Qty: 30 TABLET | Refills: 0 | Status: SHIPPED | OUTPATIENT
Start: 2018-11-29 | End: 2018-12-14

## 2018-11-29 NOTE — TELEPHONE ENCOUNTER
Routing refill request to provider for review/approval because:  No PHQ-9    SHIMON GonzalesN, RN  [facility]

## 2018-11-29 NOTE — TELEPHONE ENCOUNTER
"Requested Prescriptions   Pending Prescriptions Disp Refills     citalopram (CELEXA) 40 MG tablet [Pharmacy Med Name: CITALOPRAM HYDROBROMIDE 40MG TABS] 30 tablet 0    Last Written Prescription Date:  5/8/18  Last Fill Quantity: 30,  # refills: 0   Last office visit: No previous visit found with prescribing provider:  6/22/18   Future Office Visit:     Sig: TAKE ONE TABLET BY MOUTH ONCE DAILY    SSRIs Protocol Failed    11/27/2018  9:46 PM       Failed - PHQ-9 score less than 5 in past 6 months    Please review last PHQ-9 score.   PHQ-9 score:  No flowsheet data found.         Passed - Patient is age 18 or older       Passed - Recent (6 mo) or future (30 days) visit within the authorizing provider's specialty    Patient had office visit in the last 6 months or has a visit in the next 30 days with authorizing provider or within the authorizing provider's specialty.  See \"Patient Info\" tab in inbasket, or \"Choose Columns\" in Meds & Orders section of the refill encounter.            "

## 2018-12-14 ENCOUNTER — OFFICE VISIT (OUTPATIENT)
Dept: FAMILY MEDICINE | Facility: OTHER | Age: 48
End: 2018-12-14
Payer: COMMERCIAL

## 2018-12-14 DIAGNOSIS — F41.9 ANXIETY: ICD-10-CM

## 2018-12-14 DIAGNOSIS — Z11.4 SCREENING FOR HIV (HUMAN IMMUNODEFICIENCY VIRUS): Primary | ICD-10-CM

## 2018-12-14 DIAGNOSIS — I10 BENIGN ESSENTIAL HYPERTENSION: ICD-10-CM

## 2018-12-14 DIAGNOSIS — L64.9 MALE PATTERN BALDNESS: ICD-10-CM

## 2018-12-14 DIAGNOSIS — R53.83 FATIGUE, UNSPECIFIED TYPE: ICD-10-CM

## 2018-12-14 DIAGNOSIS — F33.41 RECURRENT MAJOR DEPRESSIVE DISORDER, IN PARTIAL REMISSION (H): ICD-10-CM

## 2018-12-14 LAB
ALBUMIN SERPL-MCNC: 3.8 G/DL (ref 3.4–5)
ALP SERPL-CCNC: 52 U/L (ref 40–150)
ALT SERPL W P-5'-P-CCNC: 56 U/L (ref 0–70)
ANION GAP SERPL CALCULATED.3IONS-SCNC: 9 MMOL/L (ref 3–14)
AST SERPL W P-5'-P-CCNC: 25 U/L (ref 0–45)
BILIRUB SERPL-MCNC: 0.6 MG/DL (ref 0.2–1.3)
BUN SERPL-MCNC: 15 MG/DL (ref 7–30)
CALCIUM SERPL-MCNC: 8.8 MG/DL (ref 8.5–10.1)
CHLORIDE SERPL-SCNC: 106 MMOL/L (ref 94–109)
CHOLEST SERPL-MCNC: 208 MG/DL
CO2 SERPL-SCNC: 21 MMOL/L (ref 20–32)
CREAT SERPL-MCNC: 1.02 MG/DL (ref 0.66–1.25)
ERYTHROCYTE [DISTWIDTH] IN BLOOD BY AUTOMATED COUNT: 12.7 % (ref 10–15)
ERYTHROCYTE [SEDIMENTATION RATE] IN BLOOD BY WESTERGREN METHOD: 12 MM/H (ref 0–15)
GFR SERPL CREATININE-BSD FRML MDRD: 78 ML/MIN/1.7M2
GLUCOSE SERPL-MCNC: 107 MG/DL (ref 70–99)
HCT VFR BLD AUTO: 45.1 % (ref 40–53)
HDLC SERPL-MCNC: 33 MG/DL
HGB BLD-MCNC: 15.6 G/DL (ref 13.3–17.7)
LDLC SERPL CALC-MCNC: 129 MG/DL
MCH RBC QN AUTO: 29.7 PG (ref 26.5–33)
MCHC RBC AUTO-ENTMCNC: 34.6 G/DL (ref 31.5–36.5)
MCV RBC AUTO: 86 FL (ref 78–100)
NONHDLC SERPL-MCNC: 175 MG/DL
PLATELET # BLD AUTO: 183 10E9/L (ref 150–450)
POTASSIUM SERPL-SCNC: 4.3 MMOL/L (ref 3.4–5.3)
PROT SERPL-MCNC: 8.4 G/DL (ref 6.8–8.8)
RBC # BLD AUTO: 5.26 10E12/L (ref 4.4–5.9)
SODIUM SERPL-SCNC: 136 MMOL/L (ref 133–144)
TRIGL SERPL-MCNC: 229 MG/DL
TSH SERPL DL<=0.005 MIU/L-ACNC: 2.22 MU/L (ref 0.4–4)
WBC # BLD AUTO: 4.4 10E9/L (ref 4–11)

## 2018-12-14 PROCEDURE — 85652 RBC SED RATE AUTOMATED: CPT | Performed by: INTERNAL MEDICINE

## 2018-12-14 PROCEDURE — 87389 HIV-1 AG W/HIV-1&-2 AB AG IA: CPT | Performed by: INTERNAL MEDICINE

## 2018-12-14 PROCEDURE — 99214 OFFICE O/P EST MOD 30 MIN: CPT | Performed by: INTERNAL MEDICINE

## 2018-12-14 PROCEDURE — 36415 COLL VENOUS BLD VENIPUNCTURE: CPT | Performed by: INTERNAL MEDICINE

## 2018-12-14 PROCEDURE — 85027 COMPLETE CBC AUTOMATED: CPT | Performed by: INTERNAL MEDICINE

## 2018-12-14 PROCEDURE — 84443 ASSAY THYROID STIM HORMONE: CPT | Performed by: INTERNAL MEDICINE

## 2018-12-14 PROCEDURE — 80053 COMPREHEN METABOLIC PANEL: CPT | Performed by: INTERNAL MEDICINE

## 2018-12-14 PROCEDURE — 80061 LIPID PANEL: CPT | Performed by: INTERNAL MEDICINE

## 2018-12-14 RX ORDER — LISINOPRIL AND HYDROCHLOROTHIAZIDE 12.5; 2 MG/1; MG/1
1 TABLET ORAL DAILY
Qty: 90 TABLET | Refills: 3 | Status: SHIPPED | OUTPATIENT
Start: 2018-12-14 | End: 2019-10-18

## 2018-12-14 RX ORDER — CITALOPRAM HYDROBROMIDE 40 MG/1
40 TABLET ORAL DAILY
Qty: 90 TABLET | Refills: 3 | Status: SHIPPED | OUTPATIENT
Start: 2018-12-14 | End: 2019-10-18

## 2018-12-14 RX ORDER — LORAZEPAM 0.5 MG/1
1 TABLET ORAL EVERY 8 HOURS PRN
Qty: 60 TABLET | Refills: 0 | Status: SHIPPED | OUTPATIENT
Start: 2018-12-14 | End: 2019-10-18

## 2018-12-14 RX ORDER — FINASTERIDE 5 MG/1
TABLET, FILM COATED ORAL
Qty: 90 TABLET | Refills: 3 | Status: SHIPPED | OUTPATIENT
Start: 2018-12-14 | End: 2019-10-18

## 2018-12-14 ASSESSMENT — PAIN SCALES - GENERAL: PAINLEVEL: NO PAIN (0)

## 2018-12-14 ASSESSMENT — PATIENT HEALTH QUESTIONNAIRE - PHQ9: SUM OF ALL RESPONSES TO PHQ QUESTIONS 1-9: 7

## 2018-12-14 NOTE — PROGRESS NOTES
SUBJECTIVE:   Victor M Mao is a 48 year old male who presents to clinic today for the following health issues:      Chief Complaint   Patient presents with     RECHECK     Fatigue                       Chief Complaint         The patient is a pleasant 48-year-old gentleman who presents today complaining of some fatigue.  He notes that he has had this coming on now for the last couple months.  It seems to be slowly getting slightly worse.  He notes that he sleeps adequately at night and does wake up refreshed but throughout the day becomes somewhat more tired.  He has less interest in the activities that he had had previously.  Additionally, he has been more nervous and anxious as of late.  He states that he has difficulty concentrating on single topic.  His mind has a tendency to wander.  He denies any suicidal ideation.  He is eating adequately and has had no significant weight change.  He was previously diagnosed with some depression and has been on citalopram and notes fairly good results with this.  He has a concurrent history of  hypertension.  Current blood pressure is 135/80 on recheck.  He is also concerned regarding male pattern balding.                       PAST, FAMILY,SOCIAL HISTORY:     Medical  History:   has no past medical history on file.     Surgical History:   has no past surgical history on file.     Social History:   reports that  has never smoked. he has never used smokeless tobacco. He reports that he drinks alcohol. He reports that he does not use drugs.     Family History:  family history is not on file.            MEDICATIONS  Current Outpatient Medications   Medication Sig Dispense Refill     citalopram (CELEXA) 40 MG tablet Take 1 tablet (40 mg) by mouth daily 90 tablet 3     finasteride (PROSCAR) 5 MG tablet Take 1/4 pill daily 90 tablet 3     lisinopril-hydrochlorothiazide (PRINZIDE/ZESTORETIC) 20-12.5 MG tablet Take 1 tablet by mouth daily 90 tablet 3     LORazepam (ATIVAN) 0.5 MG  tablet Take 2 tablets (1 mg) by mouth every 8 hours as needed for anxiety 60 tablet 0     ranitidine (ZANTAC) 150 MG tablet Take 150 mg by mouth 2 times daily       sildenafil (VIAGRA) 100 MG tablet Take 1 tablet (100 mg) by mouth daily as needed (Erectile dysfunction) 10 tablet 3         --------------------------------------------------------------------------------------------------------------------                              Review of Systems       LUNGS: Pt denies: cough, excess sputum, hemoptysis, or shortness of breath.   HEART: Pt denies: chest pain, arrhythmia, syncope, tachy or bradyarrhythmia.   GI: Pt denies: nausea, vomiting, diarrhea, constipation, melena, or hematochezia.   NEURO: Pt denies: seizures, strokes, diplopia, weakness, paraesthesias, or paralysis.   SKIN: Pt denies: itching, rashes, discoloration, or specific lesions of concern. Denies recent hair loss.   PSYCH: The patient denies any suicidal ideation.  Does have some ongoing depression which is stable.  Has modest anxiety.                                     Examination      /80 (BP Location: Left arm, Patient Position: Sitting, Cuff Size: Adult Regular)   Pulse 92   Temp 97.1  F (36.2  C) (Temporal)   Resp 18   Wt 122.9 kg (271 lb)   SpO2 99%   BMI 33.87 kg/m       LUNGS: clear bilaterally, airflow is brisk, no intercostal retraction or stridor is noted. No coughing is noted during visit.   HEART:  regular without rubs, clicks, gallops, or murmurs. PMI is nondisplaced. Upstrokes are brisk. S1,S2 are heard.   GI: Abdomen is soft, without rebound, guarding or tenderness. Bowel sounds are appropriate. No renal bruits are heard.   NEURO: Pt is alert and appropriate. No neurologic lateralization is noted. Cranial nerves 2-12 are intact. Peripheral sensory and motor function are grossly normal.    SKIN:  warm and dry. No erythema, or rashes are noted. No specific lesions of concern are noted.    PSYCH: The patient appears  grossly appropriate. Maintains good eye contact, does not have any jittery or atypical motion. Displays appropriate affect.                                           Decision Making    1. Fatigue, unspecified type  Rule out physiologic causes such as anemia, hypothyroidism etc.  - CBC with platelets  - Comprehensive metabolic panel  - TSH with free T4 reflex  - ESR: Erythrocyte sedimentation rate    2. Recurrent major depressive disorder, in partial remission (H)  Continue citalopram at current dosage  - citalopram (CELEXA) 40 MG tablet; Take 1 tablet (40 mg) by mouth daily  Dispense: 90 tablet; Refill: 3    3. Male pattern baldness  Continue finasteride  - finasteride (PROSCAR) 5 MG tablet; Take 1/4 pill daily  Dispense: 90 tablet; Refill: 3    4. Benign essential hypertension  Continue current medication and check lipid levels  - Lipid panel reflex to direct LDL Fasting  - lisinopril-hydrochlorothiazide (PRINZIDE/ZESTORETIC) 20-12.5 MG tablet; Take 1 tablet by mouth daily  Dispense: 90 tablet; Refill: 3    5. Anxiety  Low-dose lorazepam for anxiety  - LORazepam (ATIVAN) 0.5 MG tablet; Take 2 tablets (1 mg) by mouth every 8 hours as needed for anxiety  Dispense: 60 tablet; Refill: 0    6. Screening for HIV (human immunodeficiency virus)  Screening performed  - HIV Screening                           FOLLOW UP   I have asked the patient to make an appointment for followup with me in 2 months pending any significant abnormality and lab work.        I have carefully explained the diagnosis and treatment options to the patient.  The patient has displayed an understanding of the above, and all subsequent questions were answered.      DO JUAN Mas    Portions of this note were produced using [employer]  Although every attempt at real-time proof reading has been made, occasional grammar/syntax errors may have been missed.

## 2018-12-15 LAB — HIV 1+2 AB+HIV1 P24 AG SERPL QL IA: NONREACTIVE

## 2018-12-20 NOTE — RESULT ENCOUNTER NOTE
Dear Victor M, your recent test results are attached.    The HIV screening test is negative.  The cholesterol is slightly elevated with an LDL of 129.  The thyroid is well adjusted.  The chemistry panel shows normal kidney and liver tests.  The blood cell count demonstrates no evidence of anemia or leukemia.  The sedimentation rate is 12 consistent with a low risk of systemic inflammation.  Feel free to contact me via the office or My Chart if you have any questions regarding the above.  Sincerely,  Lee Chowdhury DO FACOI

## 2018-12-28 ENCOUNTER — MYC MEDICAL ADVICE (OUTPATIENT)
Dept: FAMILY MEDICINE | Facility: OTHER | Age: 48
End: 2018-12-28

## 2018-12-28 DIAGNOSIS — N52.9 ERECTILE DYSFUNCTION, UNSPECIFIED ERECTILE DYSFUNCTION TYPE: Primary | ICD-10-CM

## 2018-12-28 RX ORDER — SILDENAFIL 100 MG/1
100 TABLET, FILM COATED ORAL DAILY PRN
Qty: 10 TABLET | Refills: 3 | Status: SHIPPED | OUTPATIENT
Start: 2018-12-28 | End: 2019-10-18

## 2019-01-23 VITALS
HEART RATE: 92 BPM | SYSTOLIC BLOOD PRESSURE: 135 MMHG | TEMPERATURE: 97.1 F | OXYGEN SATURATION: 99 % | BODY MASS INDEX: 33.87 KG/M2 | RESPIRATION RATE: 18 BRPM | DIASTOLIC BLOOD PRESSURE: 80 MMHG | WEIGHT: 271 LBS

## 2019-04-29 ENCOUNTER — OFFICE VISIT (OUTPATIENT)
Dept: FAMILY MEDICINE | Facility: CLINIC | Age: 49
End: 2019-04-29
Payer: COMMERCIAL

## 2019-04-29 VITALS
WEIGHT: 273 LBS | TEMPERATURE: 96.8 F | SYSTOLIC BLOOD PRESSURE: 150 MMHG | HEART RATE: 105 BPM | RESPIRATION RATE: 20 BRPM | HEIGHT: 76 IN | BODY MASS INDEX: 33.24 KG/M2 | OXYGEN SATURATION: 98 % | DIASTOLIC BLOOD PRESSURE: 94 MMHG

## 2019-04-29 DIAGNOSIS — I10 BENIGN ESSENTIAL HYPERTENSION: ICD-10-CM

## 2019-04-29 DIAGNOSIS — J06.9 VIRAL URI: ICD-10-CM

## 2019-04-29 DIAGNOSIS — J18.9 COMMUNITY ACQUIRED PNEUMONIA, UNSPECIFIED LATERALITY: Primary | ICD-10-CM

## 2019-04-29 PROCEDURE — 99214 OFFICE O/P EST MOD 30 MIN: CPT | Performed by: FAMILY MEDICINE

## 2019-04-29 RX ORDER — DOXYCYCLINE HYCLATE 100 MG
100 TABLET ORAL 2 TIMES DAILY
Qty: 10 TABLET | Refills: 0 | Status: SHIPPED | OUTPATIENT
Start: 2019-04-29 | End: 2019-09-06

## 2019-04-29 ASSESSMENT — MIFFLIN-ST. JEOR: SCORE: 2208.33

## 2019-04-29 NOTE — PROGRESS NOTES
SUBJECTIVE:   Victor M Mao is a 48 year old male who presents to clinic today for the following health issues:      HPI  Acute Illness   Acute illness concerns: cough, sinus problems  Onset: 1 week    Fever: no    Chills/Sweats: YES    Headache (location?): YES    Sinus Pressure:YES    Conjunctivitis:  no    Ear Pain: no    Rhinorrhea: YES    Congestion: YES    Sore Throat: YES     Cough: YES-productive sometimes of green sputum, worsening over time.  Some brown tinge.      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      Has not taken blood pressure medication today.  History of hypertension.    Reviewed and updated as needed this visit by clinical staff  Tobacco  Allergies  Meds  Problems  Med Hx  Surg Hx  Fam Hx  Soc Hx          Reviewed and updated as needed this visit by Provider  Tobacco  Allergies  Meds  Problems  Med Hx  Surg Hx  Fam Hx         Over the past couple days, patient has noticed diminished exercise capacity and shortness of breath with exertion.  He had difficulty walking up the hill from the parking lot to get to our clinic today.  He also had to stop and catch his breath on the walk down the flores from the  to our office area.    Patient has no history of asthma.  Some history of seasonal allergies.    He has history of hypertension and has not taken his blood pressure medication today.    Review of Systems  10 point ROS of systems including Constitutional, Eyes, HENT, Respiratory, Cardiovascular, Gastroenterology, Genitourinary, Integumentary, Muscularskeletal, Psychiatric were all negative except for pertinent positives noted in my HPI.     OBJECTIVE:   BP (!) 150/94   Pulse 105   Temp 96.8  F  "(36  C) (Temporal)   Resp 20   Ht 1.92 m (6' 3.59\")   Wt 123.8 kg (273 lb)   SpO2 98%   BMI 33.59 kg/m    Body mass index is 33.59 kg/m .  Physical Exam   Constitutional: He appears well-developed and well-nourished. No distress.   HENT:   Head: Normocephalic.   Right Ear: Tympanic membrane, external ear and ear canal normal.   Left Ear: Tympanic membrane, external ear and ear canal normal.   Nose: Mucosal edema and rhinorrhea present. Right sinus exhibits no maxillary sinus tenderness and no frontal sinus tenderness. Left sinus exhibits no maxillary sinus tenderness and no frontal sinus tenderness.   Mouth/Throat: Uvula is midline, oropharynx is clear and moist and mucous membranes are normal. Mucous membranes are not dry. No oropharyngeal exudate. No tonsillar exudate.   Eyes: Conjunctivae are normal.   Neck: Trachea normal and normal range of motion. Neck supple. No tracheal tenderness present. No thyroid mass and no thyromegaly present.   Cardiovascular: Normal rate, regular rhythm and normal heart sounds. Exam reveals no friction rub.   No murmur heard.  Pulmonary/Chest: Effort normal and breath sounds normal. No stridor. He has no decreased breath sounds. He has no wheezes. He has no rhonchi. He has no rales.   Transmitted upper airway sounds.   Musculoskeletal: He exhibits no edema.   Lymphadenopathy:     He has no cervical adenopathy.   Neurological: He is alert.   Skin: No rash noted.        ASSESSMENT/PLAN:       ICD-10-CM    1. Community acquired pneumonia, unspecified laterality J18.9 doxycycline hyclate (VIBRA-TABS) 100 MG tablet   2. Viral URI J06.9    3. Benign essential hypertension I10      PLAN:  1.  Patient has symptoms consistent with an early pneumonia.  We discussed chest x-ray, but patient declined after I noted that I would recommend treatment with doxycycline regardless of the chest x-ray results.  Patient is able to return to work when he feels able to after today as he is afebrile.  " We discussed that it may take up to 4 to 6 weeks before he has complete return of his previous exercise capacity.  After 1 to 2 days, he should start noticing gradual improvement and if he feels like his symptoms are worsening or failing to improve after few days, he should follow-up for reevaluation.  2.  Patient does have some postnasal drip that also may be contributing his cough. See below for over the counter medication recommendations for management of this issue.  3.  Patient was advised to resume his blood pressure medication and follow-up with his primary care provider if his blood pressure is not back at goal.    Patient Instructions   1.  Saline sinus rinse (one form comes in a squirt bottle form while another kind is known as a Neti Pots).  Follow directions on package.  May do this 1-2 times per day.  2.  May also use Afrin (oxymetazoline) nasal spray for a maximum of 3 days for symptom control.  Do not use for longer than this.  A good idea is to use this medication with saline nasal irrigation.  If you choose to do this, use the Afrin about 30-45 minutes after the sinus rinse to maximize effect of medication.    3.  Antihistamines:  Daytime:  Zyrtec (cetirizine).  Nighttime:  Benadryl (diphenhydramine).  4.  Tylenol (acetaminophen) or Advil (ibuprofen) as needed for pain and/or fever.         Follow up with Provider - Return in about 1 week (around 5/6/2019) for recheck if symptoms worsen or fail to improve.      Zac Sun MD   [facility]

## 2019-04-29 NOTE — PATIENT INSTRUCTIONS
1.  Saline sinus rinse (one form comes in a squirt bottle form while another kind is known as a Neti Pots).  Follow directions on package.  May do this 1-2 times per day.  2.  May also use Afrin (oxymetazoline) nasal spray for a maximum of 3 days for symptom control.  Do not use for longer than this.  A good idea is to use this medication with saline nasal irrigation.  If you choose to do this, use the Afrin about 30-45 minutes after the sinus rinse to maximize effect of medication.    3.  Antihistamines:  Daytime:  Zyrtec (cetirizine).  Nighttime:  Benadryl (diphenhydramine).  4.  Tylenol (acetaminophen) or Advil (ibuprofen) as needed for pain and/or fever.

## 2019-05-28 ENCOUNTER — APPOINTMENT (OUTPATIENT)
Dept: CARDIOLOGY | Facility: CLINIC | Age: 49
End: 2019-05-28
Attending: EMERGENCY MEDICINE
Payer: COMMERCIAL

## 2019-05-28 ENCOUNTER — TELEPHONE (OUTPATIENT)
Dept: FAMILY MEDICINE | Facility: OTHER | Age: 49
End: 2019-05-28

## 2019-05-28 ENCOUNTER — HOSPITAL ENCOUNTER (EMERGENCY)
Facility: CLINIC | Age: 49
Discharge: HOME | End: 2019-05-28
Attending: EMERGENCY MEDICINE | Admitting: EMERGENCY MEDICINE
Payer: COMMERCIAL

## 2019-05-28 ENCOUNTER — APPOINTMENT (OUTPATIENT)
Dept: GENERAL RADIOLOGY | Facility: CLINIC | Age: 49
End: 2019-05-28
Attending: EMERGENCY MEDICINE
Payer: COMMERCIAL

## 2019-05-28 VITALS
DIASTOLIC BLOOD PRESSURE: 103 MMHG | OXYGEN SATURATION: 97 % | WEIGHT: 274 LBS | HEART RATE: 78 BPM | RESPIRATION RATE: 11 BRPM | BODY MASS INDEX: 33.36 KG/M2 | SYSTOLIC BLOOD PRESSURE: 152 MMHG | TEMPERATURE: 96.4 F | HEIGHT: 76 IN

## 2019-05-28 DIAGNOSIS — R07.89 CHEST WALL PAIN: ICD-10-CM

## 2019-05-28 DIAGNOSIS — K21.9 GASTROESOPHAGEAL REFLUX DISEASE, ESOPHAGITIS PRESENCE NOT SPECIFIED: ICD-10-CM

## 2019-05-28 DIAGNOSIS — R07.89 ATYPICAL CHEST PAIN: ICD-10-CM

## 2019-05-28 LAB
ALBUMIN SERPL-MCNC: 3.5 G/DL (ref 3.4–5)
ALP SERPL-CCNC: 49 U/L (ref 40–150)
ALT SERPL W P-5'-P-CCNC: 69 U/L (ref 0–70)
ANION GAP SERPL CALCULATED.3IONS-SCNC: 7 MMOL/L (ref 3–14)
AST SERPL W P-5'-P-CCNC: 33 U/L (ref 0–45)
BASOPHILS # BLD AUTO: 0 10E9/L (ref 0–0.2)
BASOPHILS NFR BLD AUTO: 1 %
BILIRUB SERPL-MCNC: 0.4 MG/DL (ref 0.2–1.3)
BUN SERPL-MCNC: 12 MG/DL (ref 7–30)
CALCIUM SERPL-MCNC: 8.8 MG/DL (ref 8.5–10.1)
CHLORIDE SERPL-SCNC: 108 MMOL/L (ref 94–109)
CO2 SERPL-SCNC: 25 MMOL/L (ref 20–32)
CREAT SERPL-MCNC: 1.06 MG/DL (ref 0.66–1.25)
D DIMER PPP FEU-MCNC: 0.3 UG/ML FEU (ref 0–0.5)
DIFFERENTIAL METHOD BLD: ABNORMAL
EOSINOPHIL NFR BLD AUTO: 3.1 %
ERYTHROCYTE [DISTWIDTH] IN BLOOD BY AUTOMATED COUNT: 12.6 % (ref 10–15)
GFR SERPL CREATININE-BSD FRML MDRD: 83 ML/MIN/{1.73_M2}
GLUCOSE SERPL-MCNC: 115 MG/DL (ref 70–99)
HCT VFR BLD AUTO: 41.8 % (ref 40–53)
HGB BLD-MCNC: 14.6 G/DL (ref 13.3–17.7)
IMM GRANULOCYTES # BLD: 0 10E9/L (ref 0–0.4)
IMM GRANULOCYTES NFR BLD: 0.2 %
LIPASE SERPL-CCNC: 128 U/L (ref 73–393)
LYMPHOCYTES # BLD AUTO: 1.9 10E9/L (ref 0.8–5.3)
LYMPHOCYTES NFR BLD AUTO: 44.6 %
MCH RBC QN AUTO: 30 PG (ref 26.5–33)
MCHC RBC AUTO-ENTMCNC: 34.9 G/DL (ref 31.5–36.5)
MCV RBC AUTO: 86 FL (ref 78–100)
MONOCYTES # BLD AUTO: 0.4 10E9/L (ref 0–1.3)
MONOCYTES NFR BLD AUTO: 9.1 %
NEUTROPHILS # BLD AUTO: 1.8 10E9/L (ref 1.6–8.3)
NEUTROPHILS NFR BLD AUTO: 42 %
NRBC # BLD AUTO: 0 10*3/UL
NRBC BLD AUTO-RTO: 0 /100
PLATELET # BLD AUTO: 149 10E9/L (ref 150–450)
POTASSIUM SERPL-SCNC: 4.1 MMOL/L (ref 3.4–5.3)
PROT SERPL-MCNC: 7.4 G/DL (ref 6.8–8.8)
RBC # BLD AUTO: 4.86 10E12/L (ref 4.4–5.9)
SODIUM SERPL-SCNC: 140 MMOL/L (ref 133–144)
TROPONIN I SERPL-MCNC: <0.015 UG/L (ref 0–0.04)
WBC # BLD AUTO: 4.2 10E9/L (ref 4–11)

## 2019-05-28 PROCEDURE — 40000264 ECHO STRESS ECHOCARDIOGRAM

## 2019-05-28 PROCEDURE — 93017 CV STRESS TEST TRACING ONLY: CPT | Performed by: INTERNAL MEDICINE

## 2019-05-28 PROCEDURE — 85379 FIBRIN DEGRADATION QUANT: CPT | Performed by: EMERGENCY MEDICINE

## 2019-05-28 PROCEDURE — 25500064 ZZH RX 255 OP 636: Performed by: EMERGENCY MEDICINE

## 2019-05-28 PROCEDURE — 93321 DOPPLER ECHO F-UP/LMTD STD: CPT | Mod: 26 | Performed by: INTERNAL MEDICINE

## 2019-05-28 PROCEDURE — 25000125 ZZHC RX 250: Performed by: EMERGENCY MEDICINE

## 2019-05-28 PROCEDURE — 93018 CV STRESS TEST I&R ONLY: CPT | Performed by: INTERNAL MEDICINE

## 2019-05-28 PROCEDURE — 93005 ELECTROCARDIOGRAM TRACING: CPT | Performed by: EMERGENCY MEDICINE

## 2019-05-28 PROCEDURE — 83690 ASSAY OF LIPASE: CPT | Performed by: EMERGENCY MEDICINE

## 2019-05-28 PROCEDURE — 80053 COMPREHEN METABOLIC PANEL: CPT | Performed by: EMERGENCY MEDICINE

## 2019-05-28 PROCEDURE — 85025 COMPLETE CBC W/AUTO DIFF WBC: CPT | Performed by: EMERGENCY MEDICINE

## 2019-05-28 PROCEDURE — 99284 EMERGENCY DEPT VISIT MOD MDM: CPT | Mod: 25 | Performed by: EMERGENCY MEDICINE

## 2019-05-28 PROCEDURE — 99285 EMERGENCY DEPT VISIT HI MDM: CPT | Mod: 25 | Performed by: EMERGENCY MEDICINE

## 2019-05-28 PROCEDURE — 25000132 ZZH RX MED GY IP 250 OP 250 PS 637: Performed by: EMERGENCY MEDICINE

## 2019-05-28 PROCEDURE — 93010 ELECTROCARDIOGRAM REPORT: CPT | Mod: Z6 | Performed by: EMERGENCY MEDICINE

## 2019-05-28 PROCEDURE — 93016 CV STRESS TEST SUPVJ ONLY: CPT | Performed by: INTERNAL MEDICINE

## 2019-05-28 PROCEDURE — 93350 STRESS TTE ONLY: CPT | Mod: 26 | Performed by: INTERNAL MEDICINE

## 2019-05-28 PROCEDURE — 84484 ASSAY OF TROPONIN QUANT: CPT | Performed by: EMERGENCY MEDICINE

## 2019-05-28 PROCEDURE — 71046 X-RAY EXAM CHEST 2 VIEWS: CPT | Mod: TC

## 2019-05-28 PROCEDURE — 93325 DOPPLER ECHO COLOR FLOW MAPG: CPT | Mod: 26 | Performed by: INTERNAL MEDICINE

## 2019-05-28 RX ORDER — ASPIRIN 81 MG/1
81 TABLET, CHEWABLE ORAL ONCE
Status: COMPLETED | OUTPATIENT
Start: 2019-05-28 | End: 2019-05-28

## 2019-05-28 RX ADMIN — HUMAN ALBUMIN MICROSPHERES AND PERFLUTREN 9 ML: 10; .22 INJECTION, SOLUTION INTRAVENOUS at 14:13

## 2019-05-28 RX ADMIN — LIDOCAINE HYDROCHLORIDE 30 ML: 20 SOLUTION ORAL; TOPICAL at 09:13

## 2019-05-28 RX ADMIN — ASPIRIN 81 MG 81 MG: 81 TABLET ORAL at 11:09

## 2019-05-28 ASSESSMENT — MIFFLIN-ST. JEOR: SCORE: 2211.42

## 2019-05-28 NOTE — ED AVS SNAPSHOT
[facility] Emergency Department  [address] [place] [address]  Phone:  [phone]  Fax:  [phone]                                    Victor M Mao   MRN: [id number]    Department:  [facility] Emergency Department   Date of Visit:  5/28/2019           After Visit Summary Signature Page    I have received my discharge instructions, and my questions have been answered. I have discussed any challenges I see with this plan with the nurse or doctor.    ..........................................................................................................................................  Patient/Patient Representative Signature      ..........................................................................................................................................  Patient Representative Print Name and Relationship to Patient    ..................................................               ................................................  Date                                   Time    ..........................................................................................................................................  Reviewed by Signature/Title    ...................................................              ..............................................  Date                                               Time          22EPIC Rev 08/18

## 2019-05-28 NOTE — TELEPHONE ENCOUNTER
Spoke to the patient. He said he has been having some chest pain the last few days. He said he has been dealing with sinus crap for the last month. He also had pneumonia. Patient said the last couple days he just has not felt right. He said his chest feels really heavy. Patient said he also has some shortness of breath and nausea. He said he has had reflux issues in the past but has never felt like this. Headache. No appetite. Patient said today his chest feels tight and heavy. Patient has been coughing a lot. Clear mucous.     RECOMMENDED DISPOSITION:  Call 911 - advised patient with his symptoms, he needs to be seen in the ED for a full evaluation.   Will comply with recommendation: YES   If further questions/concerns or if Sx do not improve, worsen or new Sx develop, call your PCP or [facility] as soon as possible.    NOTES:  Disposition was determined by the first positive assessment question, therefore all previous assessment questions were negative.  Informed to check provider manual or call insurance company to assure coverage.    Guideline used: Chest Pain  Telephone Triage Protocols for Nurses, Fifth Edition, Bri Crawley RN

## 2019-05-28 NOTE — ED TRIAGE NOTES
"Presents with concerns for chest \"tightness\" that is pretty constant over the past few days, increases in intensity. He reports some nausea at times. He also reports recent pneumonia infection and still has productive cough- clear. Keiko Solares RN  "

## 2019-05-28 NOTE — ED NOTES
Chalo Duran in cardiology; Patient will have stress echo at 1400. Dr. Rodriguez and patient notified. Keiko Solares RN

## 2019-05-28 NOTE — DISCHARGE INSTRUCTIONS
Add aspirin to your med regime.  If you have persistent symptoms follow-up with cardiology.  Call [phone] for specialty scheduling.

## 2019-05-28 NOTE — ED PROVIDER NOTES
History     Chief Complaint   Patient presents with     Chest Pain     HPI  Victor M Mao is a 48 year old male who presents with complaints of diffuse mild chest pain over the last 2 to 3 days.  Pain is constant.  No precipitating or relieving factors.  Currently pain is mild in nature.  Does not radiate to his neck or back.  Recently treated for pneumonia and currently denies cough.  No fever or chills.  No nausea or vomiting.  Does have a history of reflux and takes over-the-counter Pepcid.  Did take it yesterday but none today.  Also took some aspirin over the weekend.  No recent fall or injury.  Denies any leg pain or swelling currently but states he does have some ankle edema at times.  He has no personal cardiac history but there is a family history of a grandparent who had heart disease in their 50s.  Patient has hypertension and is on Zestoretic for this.  States he has borderline cholesterol but is not on meds for it.  Denies history of diabetes.  Has not been a smoker.  No personal or family history of DVT or PE.  Patient states he was due to go on vacation today but this was concerning enough that he called the nurse line who recommended to be evaluated.  He has not taken anything for his pain today.  Patient states he also has anxiety which is not helping matters today.    Allergies:  Allergies   Allergen Reactions     Sulfa Drugs Anaphylaxis     Tetanus Toxoid      Other reaction(s): Wheezing       Problem List:    Patient Active Problem List    Diagnosis Date Noted     Anxiety 10/30/2017     Priority: Medium     Benign essential hypertension 10/30/2017     Priority: Medium        Past Medical History:    No past medical history on file.    Past Surgical History:    No past surgical history on file.    Family History:    No family history on file.    Social History:  Marital Status:  Single [1]  Social History     Tobacco Use     Smoking status: Never Smoker     Smokeless tobacco: Never Used  "  Substance Use Topics     Alcohol use: Yes     Comment: occasional     Drug use: No        Medications:      citalopram (CELEXA) 40 MG tablet   finasteride (PROSCAR) 5 MG tablet   lisinopril-hydrochlorothiazide (PRINZIDE/ZESTORETIC) 20-12.5 MG tablet   LORazepam (ATIVAN) 0.5 MG tablet   ranitidine (ZANTAC) 150 MG tablet   sildenafil (VIAGRA) 100 MG tablet         Review of Systems all other systems reviewed and are negative.    Physical Exam   BP: (!) 166/113  Pulse: 74  Heart Rate: 67  Temp: 96.4  F (35.8  C)  Resp: 18  Height: 191.8 cm (6' 3.5\")  Weight: 124.3 kg (274 lb)  SpO2: 99 %      Physical Exam General alert cooperative male in mild to moderate distress.  HEENT shows no proptosis.  No scleral icterus.  Speech is clear concise.  Neck is supple without limitation.  Lungs are clear without adventitious sounds.  Back is nontender to percussion.  Cardiac auscultation is normal without murmur.  He does have reproducible chest pain on the left lower sternal border.  No crepitus or step-off is felt.  No rashes over this area.  Abdomen is obese with active bowel sounds.  On palpation he has mild epigastric tenderness without guarding or rebound.  There is no organomegaly or masses.  On his legs is no edema.  He has no calf or thigh tenderness.  Homans exam is negative.    ED Course        Procedures               EKG Interpretation:      Interpreted by Abdelrahman Rodriguez  Time reviewed: 8:20  Symptoms at time of EKG: Midsternal chest pain  Rhythm: normal sinus   Rate: Normal  Axis: Normal  Ectopy: none  Conduction: normal  ST Segments/ T Waves: No acute ischemic changes  Q Waves: none  Comparison to prior: No old EKG available    Clinical Impression: normal EKG                Critical Care time:  none               Results for orders placed or performed during the hospital encounter of 05/28/19 (from the past 24 hour(s))   CBC with platelets differential   Result Value Ref Range    WBC 4.2 4.0 - 11.0 10e9/L    RBC " Count 4.86 4.4 - 5.9 10e12/L    Hemoglobin 14.6 13.3 - 17.7 g/dL    Hematocrit 41.8 40.0 - 53.0 %    MCV 86 78 - 100 fl    MCH 30.0 26.5 - 33.0 pg    MCHC 34.9 31.5 - 36.5 g/dL    RDW 12.6 10.0 - 15.0 %    Platelet Count 149 (L) 150 - 450 10e9/L    Diff Method Automated Method     % Neutrophils 42.0 %    % Lymphocytes 44.6 %    % Monocytes 9.1 %    % Eosinophils 3.1 %    % Basophils 1.0 %    % Immature Granulocytes 0.2 %    Nucleated RBCs 0 0 /100    Absolute Neutrophil 1.8 1.6 - 8.3 10e9/L    Absolute Lymphocytes 1.9 0.8 - 5.3 10e9/L    Absolute Monocytes 0.4 0.0 - 1.3 10e9/L    Absolute Basophils 0.0 0.0 - 0.2 10e9/L    Abs Immature Granulocytes 0.0 0 - 0.4 10e9/L    Absolute Nucleated RBC 0.0    Troponin I   Result Value Ref Range    Troponin I ES <0.015 0.000 - 0.045 ug/L   Comprehensive metabolic panel   Result Value Ref Range    Sodium 140 133 - 144 mmol/L    Potassium 4.1 3.4 - 5.3 mmol/L    Chloride 108 94 - 109 mmol/L    Carbon Dioxide 25 20 - 32 mmol/L    Anion Gap 7 3 - 14 mmol/L    Glucose 115 (H) 70 - 99 mg/dL    Urea Nitrogen 12 7 - 30 mg/dL    Creatinine 1.06 0.66 - 1.25 mg/dL    GFR Estimate 83 >60 mL/min/[1.73_m2]    GFR Estimate If Black >90 >60 mL/min/[1.73_m2]    Calcium 8.8 8.5 - 10.1 mg/dL    Bilirubin Total 0.4 0.2 - 1.3 mg/dL    Albumin 3.5 3.4 - 5.0 g/dL    Protein Total 7.4 6.8 - 8.8 g/dL    Alkaline Phosphatase 49 40 - 150 U/L    ALT 69 0 - 70 U/L    AST 33 0 - 45 U/L   D dimer quantitative   Result Value Ref Range    D Dimer 0.3 0.0 - 0.50 ug/ml FEU   Lipase   Result Value Ref Range    Lipase 128 73 - 393 U/L   XR Chest 2 Views    Narrative    CHEST TWO VIEWS  5/28/2019 9:07 AM     HISTORY: 48-year-old patient with cough and chest pain.       Impression    IMPRESSION: Since June 22, 2018, heart size is normal. No pleural  effusion, pneumothorax, or abnormal area of consolidation. Pleural  thickening bilaterally, unchanged.    ROCAEL NEWBY MD   Echo Stress Echocardiogram    Narrative     [id number]  [id number]  [id number]  [id number]^CAITIE^RICHELLE^E           [facility]  Echocardiography Laboratory  [address] [place] [address]        Name: CAPRICE PATEL  MRN: [id number]  : 1970  Study Date: 2019 01:52 PM  Age: 48 yrs  Gender: Male  Patient Location: [facility]  Reason For Study: Chest Pain  Ordering Physician: RICHELLE BLOOD  Referring Physician: ALAN PAREKH  Performed By: Julieta Bridges RDCS     BSA: 2.5 m2  Height: 73 in  Weight: 272 lb  HR: 88  BP: 142/86 mmHg  Medications: Lisinopril,HCTZ,viagra  _____________________________________________________________________________  __        Procedure  Stress Echo Complete. Contrast Optison.  _____________________________________________________________________________  __        Interpretation Summary  A treadmill exercise test according to the Reji protocol was performed.  The patient exercised 8:02.  The patient exhibited no chest pain during exercise.  The EKG portion of this stress test was negative for inducible ischemia (see  echo results below).  Normal resting wall motion and no stress-induced wall motion abnormality.  This was a normal stress echocardiogram with no evidence of stress-induced  ischemia.  There is no comparison study available.  _____________________________________________________________________________  __     Stress  The patient exercised 8:02.  Exercise was stopped due to dyspnea.  There was a borderline hypertensive BP response to exercise.  The patient exhibited no chest pain during exercise.  Target Heart Rate was achieved.  A treadmill exercise test according to the Reji protocol was performed.  No arrhythmia noted.  The EKG portion of this stress test was negative for inducible ischemia (see  echo results below).  Normal resting wall motion and no stress-induced wall motion abnormality.  Normal left ventricular function and wall motion at rest and post-stress.  This was a normal  stress echocardiogram with no evidence of stress-induced  ischemia.     Baseline  The patient is in normal sinus rhythm.  Resting ECG is normal.  Normal left ventricular wall motion.  The visual ejection fraction is estimated at 60-65%.     Stress Results             Protocol:  Reji          Maximum Predicted HR:   173 bpm             Target HR: 147 bpm        % Maximum Predicted HR: 99 %             Duration  Heart    Stage  (mm:ss)  Rate    BP                      Comment                    (bpm)   Stage 1  3:00     122  142/88   Stage 2  3:00     150  158/88inferior lead T wave inversion & lateral ST                                Changes nonspecific   Stage 3  2:02     171  122/90RPP [id number]; FAC Below; [facility] 9  Recovery  6:00     98   158/98                        Stress Duration:   8:02 mm:ss *                  Maximum Stress HR: 171 bpm *           METS: 10     Mitral Valve  The mitral valve is normal in structure and function. There is trace mitral  regurgitation.     Tricuspid Valve  The tricuspid valve is normal in structure and function. There is trace  tricuspid regurgitation.     Aortic Valve  No aortic regurgitation is present. No aortic stenosis is present.        Pericardium  There is no pericardial effusion.  _____________________________________________________________________________  __                                Report approved by: Patricia Ingram 05/28/2019 04:05 PM                    _____________________________________________________________________________  __          Medications   lidocaine HCl (XYLOCAINE) 2 % 15 mL, alum & mag hydroxide-simethicone (MYLANTA ES/MAALOX  ES) 15 mL GI Cocktail (30 mLs Oral Given 5/28/19 9074)   aspirin (ASA) chewable tablet 81 mg (81 mg Oral Given 5/28/19 1109)   perflutren diluted 1mL to 2mL with saline (OPTISON) diluted injection 2 mL (9 mLs Intravenous Given 5/28/19 5603)   sodium chloride (PF) 0.9% PF flush 10 mL (10 mLs Intravenous Given 5/28/19  1419)     On recheck at 940 patient states the GI cocktail did benefit in his pain was better but not completely resolved.  Discussed results of his EKG, blood work, and chest x-ray being normal or unchanged.  Discussed stress test.  Assessments & Plan (with Medical Decision Making)   Victor M Mao is a 48 year old male who presents with complaints of diffuse mild chest pain over the last 2 to 3 days.  Pain is constant.  No precipitating or relieving factors.  Currently pain is mild in nature.  Does not radiate to his neck or back.  Recently treated for pneumonia and currently denies cough.  No fever or chills.  No nausea or vomiting.  Does have a history of reflux and takes over-the-counter Pepcid.  Did take it yesterday but none today.  Also took some aspirin over the weekend.  No recent fall or injury.  Denies any leg pain or swelling currently but states he does have some ankle edema at times.  He has no personal cardiac history but there is a family history of a grandparent who had heart disease in their 50s.  Patient has hypertension and is on Zestoretic for this.  States he has borderline cholesterol but is not on meds for it.  Denies history of diabetes.  Has not been a smoker.  No personal or family history of DVT or PE.  Patient states he was due to go on vacation today but this was concerning enough that he called the nurse line who recommended to be evaluated.  He has not taken anything for his pain today.  Patient states he also has anxiety which is not helping matters today.  On presentation patient was afebrile and vitally stable.  He was mildly hypertensive especially on the diastolic aspect.  Cardiac and respiratory auscultation there unremarkable.  He had some reproducible component to his pain along the left sternal border.  He also had some discomfort with palpation of the upper abdomen in the epigastrium.  No leg pain or swelling.  Negative Homans and normal d-dimer so doubt DVT or PE causing  his symptoms.  He is initial EKG showed no acute ischemic changes and his first troponin was normal.  There is no evidence of pancreatitis, hepatitis, or biliary disease by blood work.  Some improvement in his symptoms with a GI cocktail.  With his personal and family risk factors we will set him up for a stress echo today.  This is planned for later this afternoon.  He will be monitored in the meantime.  He was given aspirin.  The stress echo reported no ischemic changes and is noted above.  Exact cause of patient's pain is unclear but may be multifactorial including chest wall pain, reflux, anxiety, and cardiac source cannot be completely ruled out.  If he has persistent symptoms recommend he follow-up with cardiology.  Meantime he will start aspirin.  He will continue his blood pressure medicines.  Reasons to return for reassessment discussed.  Chest pain of unclear etiology information is provided.  I have reviewed the nursing notes.    I have reviewed the findings, diagnosis, plan and need for follow up with the patient.          Medication List      There are no discharge medications for this visit.         Final diagnoses:   Atypical chest pain   Gastroesophageal reflux disease, esophagitis presence not specified   Chest wall pain       5/28/2019   [facility] EMERGENCY DEPARTMENT     Abdelrahman Rodriguez MD  05/28/19 9658

## 2019-06-21 ENCOUNTER — TELEPHONE (OUTPATIENT)
Dept: FAMILY MEDICINE | Facility: CLINIC | Age: 49
End: 2019-06-21

## 2019-06-21 DIAGNOSIS — J18.9 COMMUNITY ACQUIRED PNEUMONIA, UNSPECIFIED LATERALITY: ICD-10-CM

## 2019-06-25 RX ORDER — DOXYCYCLINE 100 MG/1
CAPSULE ORAL
Qty: 10 CAPSULE | Refills: 0 | OUTPATIENT
Start: 2019-06-25

## 2019-06-25 NOTE — TELEPHONE ENCOUNTER
"Declined.  This was prescribed for an acute illness.  If he needs this medication again, he will need an appointment.  Please help him to schedule if he calls back.    RN has attempted to contact this patient by phone to return their call, but there is no response.  Left message to \"call clinic at earliest convenience\".  Will try again later.     CAMILLA Curran, RN      "

## 2019-06-26 NOTE — TELEPHONE ENCOUNTER
"RN has attempted to contact this patient by phone to return their call, but there is no response.  Left message to \"call clinic at earliest convenience\".  Will try again later.     SHIMON CurranN, RN      "

## 2019-06-27 NOTE — TELEPHONE ENCOUNTER
MyChart sent.  3rd attempt to reach patient.  Closing this encounter.  Michelle Singleton, BSN, RN

## 2019-08-11 DIAGNOSIS — I10 BENIGN ESSENTIAL HYPERTENSION: ICD-10-CM

## 2019-08-13 RX ORDER — LISINOPRIL AND HYDROCHLOROTHIAZIDE 12.5; 2 MG/1; MG/1
TABLET ORAL
Qty: 30 TABLET | Refills: 0 | Status: SHIPPED | OUTPATIENT
Start: 2019-08-13 | End: 2019-09-06

## 2019-08-13 NOTE — TELEPHONE ENCOUNTER
"Routing refill request to provider for review/approval because:  Labs out of range:  BP  T'd up 1 month for provider review.    Will forward to schedulers to schedule patient for BP recheck.      Requested Prescriptions   Pending Prescriptions Disp Refills     lisinopril-hydrochlorothiazide (PRINZIDE/ZESTORETIC) 20-12.5 MG tablet [Pharmacy Med Name: LISINOPRIL-HYDROCHLORO 20-12.5 TABS] 30 tablet 3     Sig: TAKE ONE TABLET BY MOUTH ONCE DAILY   Last Written Prescription Date:  12/14/2018  Last Fill Quantity: 90,  # refills: 3   Last office visit: 4/29/2019    Future Office Visit:        Diuretics (Including Combos) Protocol Failed - 8/11/2019  1:03 AM        Failed - Blood pressure under 140/90 in past 12 months     BP Readings from Last 3 Encounters:   05/28/19 (!) 152/103   04/29/19 (!) 150/94   12/14/18 135/80           Passed - Recent (12 mo) or future (30 days) visit within the authorizing provider's specialty     Patient had office visit in the last 12 months or has a visit in the next 30 days with authorizing provider or within the authorizing provider's specialty.  See \"Patient Info\" tab in inbasket, or \"Choose Columns\" in Meds & Orders section of the refill encounter.            Passed - Medication is active on med list        Passed - Patient is age 18 or older        Passed - Normal serum creatinine on file in past 12 months     Recent Labs   Lab Test 05/28/19  0825   CR 1.06           Passed - Normal serum potassium on file in past 12 months     Recent Labs   Lab Test 05/28/19  0825   POTASSIUM 4.1           Passed - Normal serum sodium on file in past 12 months     Recent Labs   Lab Test 05/28/19  0825            Vianey Piedra RN      "

## 2019-09-06 ENCOUNTER — OFFICE VISIT (OUTPATIENT)
Dept: FAMILY MEDICINE | Facility: CLINIC | Age: 49
End: 2019-09-06
Payer: COMMERCIAL

## 2019-09-06 ENCOUNTER — TELEPHONE (OUTPATIENT)
Dept: FAMILY MEDICINE | Facility: OTHER | Age: 49
End: 2019-09-06

## 2019-09-06 VITALS
WEIGHT: 267 LBS | SYSTOLIC BLOOD PRESSURE: 148 MMHG | DIASTOLIC BLOOD PRESSURE: 76 MMHG | BODY MASS INDEX: 33.2 KG/M2 | HEART RATE: 74 BPM | HEIGHT: 75 IN | RESPIRATION RATE: 18 BRPM | OXYGEN SATURATION: 99 % | TEMPERATURE: 98.2 F

## 2019-09-06 DIAGNOSIS — F33.41 RECURRENT MAJOR DEPRESSIVE DISORDER, IN PARTIAL REMISSION (H): ICD-10-CM

## 2019-09-06 DIAGNOSIS — R10.11 RUQ ABDOMINAL PAIN: ICD-10-CM

## 2019-09-06 DIAGNOSIS — R13.19 ESOPHAGEAL DYSPHAGIA: ICD-10-CM

## 2019-09-06 DIAGNOSIS — K21.00 GASTROESOPHAGEAL REFLUX DISEASE WITH ESOPHAGITIS: Primary | ICD-10-CM

## 2019-09-06 DIAGNOSIS — K42.9 UMBILICAL HERNIA WITHOUT OBSTRUCTION AND WITHOUT GANGRENE: ICD-10-CM

## 2019-09-06 LAB
ALBUMIN SERPL-MCNC: 3.8 G/DL (ref 3.4–5)
ALP SERPL-CCNC: 52 U/L (ref 40–150)
ALT SERPL W P-5'-P-CCNC: 81 U/L (ref 0–70)
AST SERPL W P-5'-P-CCNC: 38 U/L (ref 0–45)
BILIRUB DIRECT SERPL-MCNC: 0.1 MG/DL (ref 0–0.2)
BILIRUB SERPL-MCNC: 0.3 MG/DL (ref 0.2–1.3)
ERYTHROCYTE [DISTWIDTH] IN BLOOD BY AUTOMATED COUNT: 12.7 % (ref 10–15)
HCT VFR BLD AUTO: 44.6 % (ref 40–53)
HGB BLD-MCNC: 15.2 G/DL (ref 13.3–17.7)
LIPASE SERPL-CCNC: 209 U/L (ref 73–393)
MCH RBC QN AUTO: 30.1 PG (ref 26.5–33)
MCHC RBC AUTO-ENTMCNC: 34.1 G/DL (ref 31.5–36.5)
MCV RBC AUTO: 88 FL (ref 78–100)
PLATELET # BLD AUTO: 156 10E9/L (ref 150–450)
PROT SERPL-MCNC: 8.1 G/DL (ref 6.8–8.8)
RBC # BLD AUTO: 5.05 10E12/L (ref 4.4–5.9)
WBC # BLD AUTO: 4.9 10E9/L (ref 4–11)

## 2019-09-06 PROCEDURE — 80076 HEPATIC FUNCTION PANEL: CPT | Performed by: FAMILY MEDICINE

## 2019-09-06 PROCEDURE — 83690 ASSAY OF LIPASE: CPT | Performed by: FAMILY MEDICINE

## 2019-09-06 PROCEDURE — 85027 COMPLETE CBC AUTOMATED: CPT | Performed by: FAMILY MEDICINE

## 2019-09-06 PROCEDURE — 99214 OFFICE O/P EST MOD 30 MIN: CPT | Performed by: FAMILY MEDICINE

## 2019-09-06 PROCEDURE — 36415 COLL VENOUS BLD VENIPUNCTURE: CPT | Performed by: FAMILY MEDICINE

## 2019-09-06 RX ORDER — OMEPRAZOLE 40 MG/1
40 CAPSULE, DELAYED RELEASE ORAL DAILY
Qty: 30 CAPSULE | Refills: 1 | Status: SHIPPED | OUTPATIENT
Start: 2019-09-06 | End: 2019-10-18

## 2019-09-06 ASSESSMENT — PAIN SCALES - GENERAL: PAINLEVEL: SEVERE PAIN (6)

## 2019-09-06 ASSESSMENT — MIFFLIN-ST. JEOR: SCORE: 2166.73

## 2019-09-06 NOTE — LETTER
[facility]  [address] [facility] [address]  [phone]        September 23, 2019    Victor M Mao  [address] [place] [id number]

## 2019-09-06 NOTE — H&P (VIEW-ONLY)
"Subjective     Victor M Mao is a 49 year old male who presents to clinic today for the following health issues:    HPI   GERD/Heartburn  Onset: 4-5 months.     Description:     Burning in chest: YES    Intensity: moderate    Progression of Symptoms: constant    Accompanying Signs & Symptoms:  Does it feel like food gets stuck: YES  Nausea: YES  Vomiting (bloody?): no   Abdominal Pain: YES  Black-Tarry stools: no :  Bloody stools: no     History:   Previous ulcers: no     Precipitating factors:   Caffeine use: YES  Alcohol use: YES  NSAID/Aspirin use: no   Tobacco use: no   Worse with fatty foods, spicy foods and caffeinated drinks.    Alleviating factors:      Therapies Tried and outcome:has been on ranitidine without continue improvement.        Above symptoms as noted above.  Mostly epigastric with sum right upper quadrant symptoms (timing on this indeterminate by patient).    Has not noticed umbilical hernia that was seen on exam today. However has had some pain intermittently independent of above symptoms.    Reviewed and updated as needed this visit by Provider  Tobacco  Allergies  Meds  Problems  Med Hx  Surg Hx  Fam Hx         Review of Systems   ROS COMP: Constitutional, HEENT, cardiovascular, pulmonary, GI, , musculoskeletal, neuro, skin, endocrine and psych systems are negative, except as otherwise noted.      Objective    BP (!) 148/76   Pulse 74   Temp 98.2  F (36.8  C) (Temporal)   Resp 18   Ht 1.905 m (6' 3\")   Wt 121.1 kg (267 lb)   SpO2 99%   BMI 33.37 kg/m    Body mass index is 33.37 kg/m .  Physical Exam   Constitutional: He appears well-developed and well-nourished.   Cardiovascular: Normal rate, regular rhythm, S1 normal, S2 normal and normal heart sounds.   No murmur heard.  Pulmonary/Chest: Effort normal and breath sounds normal. No respiratory distress. He has no wheezes. He has no rhonchi. He has no rales.   Abdominal: Soft. Bowel sounds are normal. He exhibits no distension. " "There is tenderness (RUQ is mild compared to epigastric) in the right upper quadrant and epigastric area. A hernia (very small right sided umbilical) is present.   Neurological: He is alert.                  Assessment & Plan     ASSESSMENT/ORDERS:    ICD-10-CM    1. Gastroesophageal reflux disease with esophagitis K21.0 Lipase     CBC with platelets     omeprazole (PRILOSEC) 40 MG DR capsule     Hepatic panel     GASTROENTEROLOGY ADULT REF PROCEDURE ONLY [facility] [phone]; [place] Provider   2. Esophageal dysphagia R13.10 Lipase     CBC with platelets     omeprazole (PRILOSEC) 40 MG DR capsule     Hepatic panel     GASTROENTEROLOGY ADULT REF PROCEDURE ONLY [facility] [phone]; [place] Provider   3. RUQ abdominal pain R10.11 Hepatic panel   4. Recurrent major depressive disorder, in partial remission (H) F33.41 DEPRESSION ACTION PLAN (DAP)   5. Umbilical hernia without obstruction and without gangrene K42.9      PLAN:  1.  Labs as noted above to rule out other causes of epigastric and right upper quadrant pain.  needs EGD due to dysphagia with GERD symptoms.  Started on 40 mg of omeprazole for next month to help with current symptoms while EGD is getting scheduled.    2.  If hepatic enzymes abnormal, will plan right upper quadrant ultrasound.  If normal, will await EGD results.  3.  We discussed umbilical hernia.  Will need to consider surgical consult at some time, but not urgent.  If bigger or painful I recommended he get a consultation.       BMI:   Estimated body mass index is 33.37 kg/m  as calculated from the following:    Height as of this encounter: 1.905 m (6' 3\").    Weight as of this encounter: 121.1 kg (267 lb).               Return for recheck pending EGD and lab results.    Zac Sun MD  [facility]    "

## 2019-09-06 NOTE — PROGRESS NOTES
"Subjective     Victor M Mao is a 49 year old male who presents to clinic today for the following health issues:    HPI   GERD/Heartburn  Onset: 4-5 months.     Description:     Burning in chest: YES    Intensity: moderate    Progression of Symptoms: constant    Accompanying Signs & Symptoms:  Does it feel like food gets stuck: YES  Nausea: YES  Vomiting (bloody?): no   Abdominal Pain: YES  Black-Tarry stools: no :  Bloody stools: no     History:   Previous ulcers: no     Precipitating factors:   Caffeine use: YES  Alcohol use: YES  NSAID/Aspirin use: no   Tobacco use: no   Worse with fatty foods, spicy foods and caffeinated drinks.    Alleviating factors:      Therapies Tried and outcome:has been on ranitidine without continue improvement.        Above symptoms as noted above.  Mostly epigastric with sum right upper quadrant symptoms (timing on this indeterminate by patient).    Has not noticed umbilical hernia that was seen on exam today. However has had some pain intermittently independent of above symptoms.    Reviewed and updated as needed this visit by Provider  Tobacco  Allergies  Meds  Problems  Med Hx  Surg Hx  Fam Hx         Review of Systems   ROS COMP: Constitutional, HEENT, cardiovascular, pulmonary, GI, , musculoskeletal, neuro, skin, endocrine and psych systems are negative, except as otherwise noted.      Objective    BP (!) 148/76   Pulse 74   Temp 98.2  F (36.8  C) (Temporal)   Resp 18   Ht 1.905 m (6' 3\")   Wt 121.1 kg (267 lb)   SpO2 99%   BMI 33.37 kg/m    Body mass index is 33.37 kg/m .  Physical Exam   Constitutional: He appears well-developed and well-nourished.   Cardiovascular: Normal rate, regular rhythm, S1 normal, S2 normal and normal heart sounds.   No murmur heard.  Pulmonary/Chest: Effort normal and breath sounds normal. No respiratory distress. He has no wheezes. He has no rhonchi. He has no rales.   Abdominal: Soft. Bowel sounds are normal. He exhibits no distension. " "There is tenderness (RUQ is mild compared to epigastric) in the right upper quadrant and epigastric area. A hernia (very small right sided umbilical) is present.   Neurological: He is alert.                  Assessment & Plan     ASSESSMENT/ORDERS:    ICD-10-CM    1. Gastroesophageal reflux disease with esophagitis K21.0 Lipase     CBC with platelets     omeprazole (PRILOSEC) 40 MG DR capsule     Hepatic panel     GASTROENTEROLOGY ADULT REF PROCEDURE ONLY [facility] [phone]; [place] Provider   2. Esophageal dysphagia R13.10 Lipase     CBC with platelets     omeprazole (PRILOSEC) 40 MG DR capsule     Hepatic panel     GASTROENTEROLOGY ADULT REF PROCEDURE ONLY [facility] [phone]; [place] Provider   3. RUQ abdominal pain R10.11 Hepatic panel   4. Recurrent major depressive disorder, in partial remission (H) F33.41 DEPRESSION ACTION PLAN (DAP)   5. Umbilical hernia without obstruction and without gangrene K42.9      PLAN:  1.  Labs as noted above to rule out other causes of epigastric and right upper quadrant pain.  needs EGD due to dysphagia with GERD symptoms.  Started on 40 mg of omeprazole for next month to help with current symptoms while EGD is getting scheduled.    2.  If hepatic enzymes abnormal, will plan right upper quadrant ultrasound.  If normal, will await EGD results.  3.  We discussed umbilical hernia.  Will need to consider surgical consult at some time, but not urgent.  If bigger or painful I recommended he get a consultation.       BMI:   Estimated body mass index is 33.37 kg/m  as calculated from the following:    Height as of this encounter: 1.905 m (6' 3\").    Weight as of this encounter: 121.1 kg (267 lb).               Return for recheck pending EGD and lab results.    Zac Sun MD  [facility]    "

## 2019-09-06 NOTE — LETTER
Upper Endoscopy    Date of procedure:_10/4/19__________      Location:[facility]_____________  Please arrive at:__1:30pm___________    Procedure time:___2:30pm_____________    Review the preparation schedule below for the five days preceding your procedure.     If you have any questions, please call [phone].          5 Days Prior  *CHECK WITH YOUR INSURANCE REGARDING COVERAGE PRIOR TO PROCEDURE.  If you take Coumadin (Warfarin), Plavix, Ticlid, Aggrenox:, insulin, diabetic pills and/or iron products contact your doctor for specific instructions.  Have INR checked the day before the procedure.  Please remember to arrange a responsible adult to accompany you home.   must be     present upon discharge.    1 Days Prior  Eat normally up until midnight.  You may drink small amounts of clear liquids up until 4 hours prior to your arrival.  **Please see Clear Liquid Diet Sheet for suggested liquids.    Procedure Day    From midnight until 3 hours prior to your procedure, you may drink small amounts of clear liquids.  Do not take your morning medications until after you have completed your procedure.  Bring a list of your medications with you on the day of your procedure.     *Reminder:  Have transportation arranged to take you home.   must accompany you to the procedure.  Do not plan to drive or operate vehicles or machinery the day of your exam.      Clear Liquid Diet  Beverages  Coffee, Decaffeinated coffee, Tea (without milk or non-dairy creamer)  Carbonated beverages (pop)  Water  Sports Drinks    Desserts  Jello (except red or purple)  Fruit ice  Popsicle    Fruit and Fruit Juices  Citrus juices, strained  Fruit nectars, strained  Apple juice  Fruit drinks    Soups  Broth or Bouillon  Consomme  Meat stock, strained  Vegetable broth, strained    Sweets  Hard candy, plain  Sugar    Miscellaneous  Flavorings  Salt    No red or purple colored juices or Jello  No dairy products  No foods containing seeds  2 days prior to procedure  No alcoholic beverages               Directions to [facility]    From the North:   Take the [address] exit off of [address] (on the south side of [place]).  Turn left on [address]  Turn left at the first stoplight (at [place]).  The hospital and clinic is the third building on the left.     From the South:  Follow [address] north to the first [place] exit.  Exit on [address] following it to the right.  Turn left at the first stoplight.  The hospital and clinic is the third building on the left.    From the East:     Following [address] west, turn left at the stoplight onto [address] Follow [address] through [place].  Take a right at the light on [address] (at [facility]).  The hospital and clinic is the third building on the left.    From the West:    Following [address] going east, turn south on [address].  Take the [address] exit off of [address] (on the south side of [place]).  Follow [address] through [place] to the stoplight on [address] (at [facility]). Take a left.  The hospital and clinic is the third building on the left.        UPPER ENDOSCOPY INFORMATION  Upper endoscopy (also known as an upper GI endoscopy, esophagogastro-duodenoscopy [EGD], or panendoscopy) is a procedure that enables your physician to examine the lining of the upper part of your gastrointestinal tract, ie. The esophagus (swallowing tube), stomach, and duodenum (first portion of the small intestine) using a thin flexible tube with its own lens and light source.    Upper endoscopy is usually performed to evaluate symptoms of persistent upper abdominal pain, nausea, vomiting or difficulty swallowing.  It is also the best test for finding the cause of bleeding from the upper gastrointestinal tract.    Upper endoscopy is more accurate than x-ray films for detecting inflammation, ulcers or tumors of the esophagus, stomach and duodenum.  Upper  endoscopy can detect early cancer and can distinguish between benign (non-cancerous) and malignant (cancerous) conditions when biopsies (small tissue samples) of suspicious areas are obtained.  Biopsies are taken for many reasons and do not necessarily mean that cancer is suspected.  A cytology test (introduction of a small brush to collect cells) may also be performed.    Upper endoscopy is also used to treat conditions present in the upper gastrointestinal tract.  A variety of instruments can be passed through the endoscope that allow many abnormalities to be treated directly with little or no discomfort.  This may include stretching narrowed areas, removing polyps (usually benign growths) or swallowed objects, or treating upper gastrointestinal bleeding.  Safe and effective endoscopic control of bleeding has reduced the need for transfusions and surgery in many patients.    For the best and safest examination, the stomach must be completely empty.  You should have nothing to eat or drink, including water, for approximately 4 hours prior to your examination.    WHAT CAN BE EXPECTED DURING THE UPPER ENDOSCOPY?  Your doctor will review with you why upper endoscopy is being performed, whether any alternative tests are available, and possible complications from the procedure.  Your throat will be sprayed with a local anesthetic before the procedure begins and you may be given medication through a vein to help you relax during the procedure.  While you are in a comfortable position on your side, the endoscope is passed through the mouth and then is passed in turn through the esophagus, stomach, and duodenum.  The endoscope does not interfere with your breathing during the test.  Most patients consider the procedure to be only slightly uncomfortable and many patients fall asleep during the procedure.    WHAT HAPPENS AFTER THE UPPER ENDOSCOPY?  After the procedure, you will be monitored in the endoscopy area until most  of the effects of the medication have work off.  Your throat may be a little sore for a while, and you may feel bloated right after the procedure because of the air introduced into your stomach during the test.  You will be able to resume your diet after you leave unless you are instructed otherwise.    If you receive sedation, you will need to arrange to have a responsible adult drive and accompany you home from the examination because the sedative may affect your judgment and reflexes for the rest of the day.  You will not be allowed to take a taxi or bus as transportation home.  If you receive sedation, you will not be allowed to drive after the procedure even though you may not feel tired.    WHAT ARE THE POSSIBLE COMPLICATIONS OF UPPER ENDOSCOPY?  Endoscopy is generally safe.  Complications can occur but are rare when the test is performed by physicians with specialized training and experience in this procedure.  Bleeding may occur from a biopsy site or where a polyp was removed.  It is usually minimal and rarely requires blood transfusions or surgery.  Localized irritation of the vein where the medication was injected may rarely cause a tender lump lasting for several weeks, but this will go away.  Applying heat packs or hot moist towels may help relieve discomfort.  Other potential risks include a reaction to the sedatives used and complications from underlying medical conditions.  Major complication, eg, perforation (a tear that might require surgery for repair) are very uncommon.      It is important for you to recognize early signs of any possible complication.  If you begin to run a fever after the test, begin to have trouble swallowing, or have increasing throat, chest or abdominal pain, let your doctor know about it promptly.

## 2019-09-06 NOTE — LETTER
[facility] [address] [address]  [phone]      September 13, 2019      Victor M Mao  [address] [place] [id number]      Dear Victor M:     To better serve you, we are sending this letter to notify you that we have attempted to contact you by telephone to schedule the following procedure(s) ordered by your physician.     _______   Colonoscopy   ___x____   Upper GI Endoscopy (EGD)   _______   Colonoscopy and Upper GI Endoscopy    To provide the highest quality of care, we strongly encourage you to call and schedule the prescribed test/procedure at your earliest convenience.   The number to the Specialty Scheduling department is [phone] and the hours are 8:00am - 4:30pm Monday through Friday.   We look forward to hearing from you.    Sincerely,    [facility]

## 2019-09-07 DIAGNOSIS — I10 BENIGN ESSENTIAL HYPERTENSION: ICD-10-CM

## 2019-09-09 RX ORDER — LISINOPRIL AND HYDROCHLOROTHIAZIDE 12.5; 2 MG/1; MG/1
TABLET ORAL
Qty: 30 TABLET | Refills: 0 | Status: SHIPPED | OUTPATIENT
Start: 2019-09-09 | End: 2019-10-18

## 2019-09-09 NOTE — TELEPHONE ENCOUNTER
Left message for patient to return call to schedule EGD/colonoscopy. If Keyanna or Christina are not available, please transfer to same day surgery

## 2019-09-09 NOTE — TELEPHONE ENCOUNTER
"Routing refill request to provider for review/approval because:  Labs out of range:  BP  T'd up 1 month for provider review.    Requested Prescriptions   Pending Prescriptions Disp Refills     lisinopril-hydrochlorothiazide (PRINZIDE/ZESTORETIC) 20-12.5 MG tablet [Pharmacy Med Name: LISINOPRIL-HYDROCHLORO 20-12.5 TABS] 30 tablet 0     Sig: TAKE 1 TABLET BY MOUTH ONCE DAILY   Last Written Prescription Date:  12/14/2018  Last Fill Quantity: 90,  # refills: 3   Last office visit: 9/6/2019 with prescribing provider:     Future Office Visit:        Diuretics (Including Combos) Protocol Failed - 9/7/2019  1:03 AM        Failed - Blood pressure under 140/90 in past 12 months     BP Readings from Last 3 Encounters:   09/06/19 (!) 148/76   05/28/19 (!) 152/103   04/29/19 (!) 150/94           Passed - Recent (12 mo) or future (30 days) visit within the authorizing provider's specialty     Patient had office visit in the last 12 months or has a visit in the next 30 days with authorizing provider or within the authorizing provider's specialty.  See \"Patient Info\" tab in inbasket, or \"Choose Columns\" in Meds & Orders section of the refill encounter.          Passed - Medication is active on med list        Passed - Patient is age 18 or older        Passed - Normal serum creatinine on file in past 12 months     Recent Labs   Lab Test 05/28/19  0825   CR 1.06           Passed - Normal serum potassium on file in past 12 months     Recent Labs   Lab Test 05/28/19  0825   POTASSIUM 4.1            Passed - Normal serum sodium on file in past 12 months     Recent Labs   Lab Test 05/28/19  0825             Vianey Piedra RN      "

## 2019-09-12 NOTE — TELEPHONE ENCOUNTER
Left message for patient to return call to schedule colonoscopy or EGD. If Christina or Keyanna are unavailable, please transfer to the surgery center.

## 2019-09-13 NOTE — TELEPHONE ENCOUNTER
Left message for patient to return call to schedule EGD/colonoscopy. If Keyanna or Christina are not available, please transfer to same day surgery      Letter sent

## 2019-09-23 NOTE — TELEPHONE ENCOUNTER
Date of colonoscopy/EGD: 10/4/19  Surgeon: Dr. Jordan  Prep:EGD  Packet:Colonoscopy/EGD instructions mailed to patient's home address.   Date: 9/23/2019      Surgery Scheduler

## 2019-10-03 ENCOUNTER — ANESTHESIA EVENT (OUTPATIENT)
Dept: GASTROENTEROLOGY | Facility: CLINIC | Age: 49
End: 2019-10-03
Payer: COMMERCIAL

## 2019-10-03 NOTE — ANESTHESIA PREPROCEDURE EVALUATION
Anesthesia Pre-Procedure Evaluation    Patient: Victor M Mao   MRN: [id number] : 1970          Preoperative Diagnosis: Gastroesophageal reflux disease with esophagitis [K21.0]  Esophageal dysphagia [R13.10]    Procedure(s):  ESOPHAGOGASTRODUODENOSCOPY (EGD)    History reviewed. No pertinent past medical history.  History reviewed. No pertinent surgical history.    Anesthesia Evaluation     . Pt has not had prior anesthetic            ROS/MED HX    ENT/Pulmonary:  - neg pulmonary ROS     Neurologic:  - neg neurologic ROS     Cardiovascular:     (+) hypertension----. : . . . :. . Previous cardiac testing Echodate:19results:Interpretation Summary  A treadmill exercise test according to the Reji protocol was performed.  The patient exercised 8:02.  The patient exhibited no chest pain during exercise.  The EKG portion of this stress test was negative for inducible ischemia (see  echo results below).  Normal resting wall motion and no stress-induced wall motion abnormality.  This was a normal stress echocardiogram with no evidence of stress-induced  ischemia.  There is no comparison study available.    Stress  The patient exercised 8:02.  Exercise was stopped due to dyspnea.  There was a borderline hypertensive BP response to exercise.  The patient exhibited no chest pain during exercise.  Target Heart Rate was achieved.  A treadmill exercise test according to the Reji protocol was performed.  No arrhythmia noted.  The EKG portion of this stress test was negative for inducible ischemia (see  echo results below).  Normal resting wall motion and no stress-induced wall motion abnormality.  Normal left ventricular function and wall motion at rest and post-stress.  This was a normal stress echocardiogram with no evidence of stress-induced  ischemia.  _____________________________________________________________________________date: results: date: results: date: results:          METS/Exercise Tolerance:  >4  "METS   Hematologic:  - neg hematologic  ROS       Musculoskeletal:  - neg musculoskeletal ROS       GI/Hepatic: Comment: ABD pain    (+) GERD Asymptomatic on medication,       Renal/Genitourinary:     (+) Pt has no history of transplant,       Endo:     (+) Obesity, .      Psychiatric:     (+) psychiatric history anxiety      Infectious Disease:  - neg infectious disease ROS       Malignancy:         Other:    (+) No chance of pregnancy C-spine cleared: N/A, no H/O Chronic Pain,                               Lab Results   Component Value Date    WBC 4.9 09/06/2019    HGB 15.2 09/06/2019    HCT 44.6 09/06/2019     09/06/2019    SED 12 12/14/2018     05/28/2019    POTASSIUM 4.1 05/28/2019    CHLORIDE 108 05/28/2019    CO2 25 05/28/2019    BUN 12 05/28/2019    CR 1.06 05/28/2019     (H) 05/28/2019    APOORVA 8.8 05/28/2019    ALBUMIN 3.8 09/06/2019    PROTTOTAL 8.1 09/06/2019    ALT 81 (H) 09/06/2019    AST 38 09/06/2019    ALKPHOS 52 09/06/2019    BILITOTAL 0.3 09/06/2019    LIPASE 209 09/06/2019    TSH 2.22 12/14/2018       Preop Vitals  BP Readings from Last 3 Encounters:   09/06/19 (!) 148/76   05/28/19 (!) 152/103   04/29/19 (!) 150/94    Pulse Readings from Last 3 Encounters:   09/06/19 74   05/28/19 78   04/29/19 105      Resp Readings from Last 3 Encounters:   09/06/19 18   05/28/19 11   04/29/19 20    SpO2 Readings from Last 3 Encounters:   09/06/19 99%   05/28/19 97%   04/29/19 98%      Temp Readings from Last 1 Encounters:   09/06/19 98.2  F (36.8  C) (Temporal)    Ht Readings from Last 1 Encounters:   09/06/19 1.905 m (6' 3\")      Wt Readings from Last 1 Encounters:   09/06/19 121.1 kg (267 lb)    Estimated body mass index is 33.37 kg/m  as calculated from the following:    Height as of 9/6/19: 1.905 m (6' 3\").    Weight as of 9/6/19: 121.1 kg (267 lb).       Anesthesia Plan      History & Physical Review  History and physical reviewed and following examination; no interval change.    ASA " Status:  2 .    NPO Status:  > 8 hours    Plan for MAC with Intravenous and Propofol induction. Maintenance will be TIVA.  Reason for MAC:  Deep or markedly invasive procedure (G8)         Postoperative Care  Postoperative pain management:  Oral pain medications.      Consents  Anesthetic plan, risks, benefits and alternatives discussed with:  Patient..                 DAHLIA Shukla CRNA

## 2019-10-04 ENCOUNTER — ANESTHESIA (OUTPATIENT)
Dept: GASTROENTEROLOGY | Facility: CLINIC | Age: 49
End: 2019-10-04
Payer: COMMERCIAL

## 2019-10-04 ENCOUNTER — HOSPITAL ENCOUNTER (OUTPATIENT)
Facility: CLINIC | Age: 49
Discharge: HOME | End: 2019-10-04
Attending: SURGERY | Admitting: SURGERY
Payer: COMMERCIAL

## 2019-10-04 LAB — UPPER GI ENDOSCOPY: NORMAL

## 2019-10-04 PROCEDURE — 88305 TISSUE EXAM BY PATHOLOGIST: CPT | Mod: 26 | Performed by: SURGERY

## 2019-10-04 PROCEDURE — 25800030 ZZH RX IP 258 OP 636: Performed by: NURSE ANESTHETIST, CERTIFIED REGISTERED

## 2019-10-04 PROCEDURE — 88305 TISSUE EXAM BY PATHOLOGIST: CPT | Performed by: SURGERY

## 2019-10-04 PROCEDURE — 25000128 H RX IP 250 OP 636: Performed by: NURSE ANESTHETIST, CERTIFIED REGISTERED

## 2019-10-04 PROCEDURE — 25000125 ZZHC RX 250: Performed by: NURSE ANESTHETIST, CERTIFIED REGISTERED

## 2019-10-04 PROCEDURE — 43239 EGD BIOPSY SINGLE/MULTIPLE: CPT | Performed by: SURGERY

## 2019-10-04 PROCEDURE — 40000296 ZZH STATISTIC ENDO RECOVERY CLASS 1:2 FIRST HOUR: Performed by: SURGERY

## 2019-10-04 PROCEDURE — 37000008 ZZH ANESTHESIA TECHNICAL FEE, 1ST 30 MIN: Performed by: SURGERY

## 2019-10-04 RX ORDER — FLUMAZENIL 0.1 MG/ML
0.2 INJECTION, SOLUTION INTRAVENOUS
Status: DISCONTINUED | OUTPATIENT
Start: 2019-10-04 | End: 2019-10-04 | Stop reason: HOSPADM

## 2019-10-04 RX ORDER — LIDOCAINE 40 MG/G
CREAM TOPICAL
Status: DISCONTINUED | OUTPATIENT
Start: 2019-10-04 | End: 2019-10-04 | Stop reason: HOSPADM

## 2019-10-04 RX ORDER — PROPOFOL 10 MG/ML
INJECTION, EMULSION INTRAVENOUS CONTINUOUS PRN
Status: DISCONTINUED | OUTPATIENT
Start: 2019-10-04 | End: 2019-10-04

## 2019-10-04 RX ORDER — LIDOCAINE HYDROCHLORIDE 20 MG/ML
INJECTION, SOLUTION INFILTRATION; PERINEURAL PRN
Status: DISCONTINUED | OUTPATIENT
Start: 2019-10-04 | End: 2019-10-04

## 2019-10-04 RX ORDER — FENTANYL CITRATE 50 UG/ML
INJECTION, SOLUTION INTRAMUSCULAR; INTRAVENOUS PRN
Status: DISCONTINUED | OUTPATIENT
Start: 2019-10-04 | End: 2019-10-04

## 2019-10-04 RX ORDER — PROPOFOL 10 MG/ML
INJECTION, EMULSION INTRAVENOUS PRN
Status: DISCONTINUED | OUTPATIENT
Start: 2019-10-04 | End: 2019-10-04

## 2019-10-04 RX ORDER — SODIUM CHLORIDE, SODIUM LACTATE, POTASSIUM CHLORIDE, CALCIUM CHLORIDE 600; 310; 30; 20 MG/100ML; MG/100ML; MG/100ML; MG/100ML
INJECTION, SOLUTION INTRAVENOUS CONTINUOUS
Status: DISCONTINUED | OUTPATIENT
Start: 2019-10-04 | End: 2019-10-04 | Stop reason: HOSPADM

## 2019-10-04 RX ORDER — ONDANSETRON 4 MG/1
4 TABLET, ORALLY DISINTEGRATING ORAL EVERY 6 HOURS PRN
Status: DISCONTINUED | OUTPATIENT
Start: 2019-10-04 | End: 2019-10-04 | Stop reason: HOSPADM

## 2019-10-04 RX ORDER — ONDANSETRON 2 MG/ML
4 INJECTION INTRAMUSCULAR; INTRAVENOUS
Status: DISCONTINUED | OUTPATIENT
Start: 2019-10-04 | End: 2019-10-04 | Stop reason: HOSPADM

## 2019-10-04 RX ORDER — ONDANSETRON 2 MG/ML
4 INJECTION INTRAMUSCULAR; INTRAVENOUS EVERY 6 HOURS PRN
Status: DISCONTINUED | OUTPATIENT
Start: 2019-10-04 | End: 2019-10-04 | Stop reason: HOSPADM

## 2019-10-04 RX ORDER — NALOXONE HYDROCHLORIDE 0.4 MG/ML
.1-.4 INJECTION, SOLUTION INTRAMUSCULAR; INTRAVENOUS; SUBCUTANEOUS
Status: DISCONTINUED | OUTPATIENT
Start: 2019-10-04 | End: 2019-10-04 | Stop reason: HOSPADM

## 2019-10-04 RX ADMIN — FENTANYL CITRATE 100 MCG: 50 INJECTION, SOLUTION INTRAMUSCULAR; INTRAVENOUS at 14:08

## 2019-10-04 RX ADMIN — PROPOFOL 100 MG: 10 INJECTION, EMULSION INTRAVENOUS at 14:16

## 2019-10-04 RX ADMIN — LIDOCAINE HYDROCHLORIDE 40 MG: 20 INJECTION, SOLUTION INFILTRATION; PERINEURAL at 14:14

## 2019-10-04 RX ADMIN — SODIUM CHLORIDE, POTASSIUM CHLORIDE, SODIUM LACTATE AND CALCIUM CHLORIDE: 600; 310; 30; 20 INJECTION, SOLUTION INTRAVENOUS at 14:08

## 2019-10-04 RX ADMIN — PROPOFOL 150 MCG/KG/MIN: 10 INJECTION, EMULSION INTRAVENOUS at 14:16

## 2019-10-04 NOTE — LETTER
Victor M Mao  [address]  [place] [facility] [address]    October 11, 2019      Dear Victor M,  This letter is to inform you of the results of your pathology report from your endoscopy.  Your pathology report was:  FINAL DIAGNOSIS:   Gastroesophageal junction, mucosal biopsy:   - Normal mature nonkeratinizing squamous epithelium.   - A small amount of benign mucous secreting glands without goblet cels.  These are benign, non-concerning findings. No specific treatment or surveillance is required at this time.  If you have further questions please don t hesitate to call our clinic at [phone].   Sincerely,     Charbel Jordan, DO

## 2019-10-04 NOTE — ANESTHESIA CARE TRANSFER NOTE
Patient: Victor M Mao    Procedure(s):  Esophagogastroduodenoscopy with Biopsy by Biopsy    Diagnosis: Gastroesophageal reflux disease with esophagitis [K21.0]  Esophageal dysphagia [R13.10]  Diagnosis Additional Information: No value filed.    Anesthesia Type:   MAC     Note:  Airway :Face Mask  Patient transferred to:Phase II  Handoff Report: Identifed the Patient, Identified the Reponsible Provider, Reviewed the pertinent medical history, Discussed the surgical course, Reviewed Intra-OP anesthesia mangement and issues during anesthesia, Set expectations for post-procedure period and Allowed opportunity for questions and acknowledgement of understanding      Vitals: (Last set prior to Anesthesia Care Transfer)    CRNA VITALS  10/4/2019 1357 - 10/4/2019 1432      10/4/2019             Pulse:  75    SpO2:  (!) 81 %                Electronically Signed By: DAHLIA Starks CRNA  October 4, 2019  2:32 PM

## 2019-10-06 DIAGNOSIS — I10 BENIGN ESSENTIAL HYPERTENSION: ICD-10-CM

## 2019-10-07 RX ORDER — LISINOPRIL AND HYDROCHLOROTHIAZIDE 12.5; 2 MG/1; MG/1
TABLET ORAL
Qty: 30 TABLET | Refills: 0 | OUTPATIENT
Start: 2019-10-07

## 2019-10-07 NOTE — TELEPHONE ENCOUNTER
Prescription was sent 12/14/18 for #90 with 3 refills.  Pharmacy notified via E-Prescribe refusal.     Anne Crawley RN  [facility]

## 2019-10-07 NOTE — TELEPHONE ENCOUNTER
"Requested Prescriptions   Pending Prescriptions Disp Refills     lisinopril-hydrochlorothiazide (PRINZIDE/ZESTORETIC) 20-12.5 MG tablet [Pharmacy Med Name: LISINOPRIL-HYDROCHLORO 20-12.5 TABS] 30 tablet 0     Sig: TAKE ONE TABLET BY MOUTH ONCE DAILY   Last Written Prescription Date:  12/14/18  Last Fill Quantity: 90,  # refills: 3   Last office visit: 9/6/2019 with prescribing provider:  9/6/19   Future Office Visit:        Diuretics (Including Combos) Protocol Failed - 10/6/2019  1:03 AM        Failed - Blood pressure under 140/90 in past 12 months     BP Readings from Last 3 Encounters:   09/06/19 (!) 148/76   05/28/19 (!) 152/103   04/29/19 (!) 150/94                 Passed - Recent (12 mo) or future (30 days) visit within the authorizing provider's specialty     Patient has had an office visit with the authorizing provider or a provider within the authorizing providers department within the previous 12 mos or has a future within next 30 days. See \"Patient Info\" tab in inbasket, or \"Choose Columns\" in Meds & Orders section of the refill encounter.              Passed - Medication is active on med list        Passed - Patient is age 18 or older        Passed - Normal serum creatinine on file in past 12 months     Recent Labs   Lab Test 05/28/19  0825   CR 1.06              Passed - Normal serum potassium on file in past 12 months     Recent Labs   Lab Test 05/28/19  0825   POTASSIUM 4.1                    Passed - Normal serum sodium on file in past 12 months     Recent Labs   Lab Test 05/28/19  0825                 "

## 2019-10-09 LAB — COPATH REPORT: NORMAL

## 2019-10-18 ENCOUNTER — OFFICE VISIT (OUTPATIENT)
Dept: FAMILY MEDICINE | Facility: OTHER | Age: 49
End: 2019-10-18
Payer: COMMERCIAL

## 2019-10-18 VITALS
WEIGHT: 263 LBS | HEART RATE: 84 BPM | SYSTOLIC BLOOD PRESSURE: 138 MMHG | OXYGEN SATURATION: 100 % | DIASTOLIC BLOOD PRESSURE: 86 MMHG | TEMPERATURE: 97.5 F | RESPIRATION RATE: 16 BRPM | BODY MASS INDEX: 32.87 KG/M2

## 2019-10-18 DIAGNOSIS — N52.9 ERECTILE DYSFUNCTION, UNSPECIFIED ERECTILE DYSFUNCTION TYPE: ICD-10-CM

## 2019-10-18 DIAGNOSIS — F41.9 ANXIETY: ICD-10-CM

## 2019-10-18 DIAGNOSIS — K42.9 UMBILICAL HERNIA WITHOUT OBSTRUCTION AND WITHOUT GANGRENE: Primary | ICD-10-CM

## 2019-10-18 DIAGNOSIS — F33.41 RECURRENT MAJOR DEPRESSIVE DISORDER, IN PARTIAL REMISSION (H): ICD-10-CM

## 2019-10-18 DIAGNOSIS — R13.19 ESOPHAGEAL DYSPHAGIA: ICD-10-CM

## 2019-10-18 DIAGNOSIS — N45.1 EPIDIDYMITIS: ICD-10-CM

## 2019-10-18 DIAGNOSIS — K21.00 GASTROESOPHAGEAL REFLUX DISEASE WITH ESOPHAGITIS: ICD-10-CM

## 2019-10-18 DIAGNOSIS — L64.9 MALE PATTERN BALDNESS: ICD-10-CM

## 2019-10-18 DIAGNOSIS — I10 BENIGN ESSENTIAL HYPERTENSION: ICD-10-CM

## 2019-10-18 PROCEDURE — 99214 OFFICE O/P EST MOD 30 MIN: CPT | Performed by: INTERNAL MEDICINE

## 2019-10-18 RX ORDER — OMEPRAZOLE 40 MG/1
40 CAPSULE, DELAYED RELEASE ORAL DAILY
Qty: 90 CAPSULE | Refills: 3 | Status: SHIPPED | OUTPATIENT
Start: 2019-10-18 | End: 2020-10-21

## 2019-10-18 RX ORDER — SILDENAFIL 100 MG/1
100 TABLET, FILM COATED ORAL DAILY PRN
Qty: 10 TABLET | Refills: 3 | Status: SHIPPED | OUTPATIENT
Start: 2019-10-18 | End: 2021-07-15

## 2019-10-18 RX ORDER — CIPROFLOXACIN 500 MG/1
500 TABLET, FILM COATED ORAL 2 TIMES DAILY
Qty: 28 TABLET | Refills: 0 | Status: SHIPPED | OUTPATIENT
Start: 2019-10-18 | End: 2020-04-21

## 2019-10-18 RX ORDER — LORAZEPAM 0.5 MG/1
1 TABLET ORAL EVERY 8 HOURS PRN
Qty: 60 TABLET | Refills: 0 | Status: SHIPPED | OUTPATIENT
Start: 2019-10-18 | End: 2021-10-12

## 2019-10-18 RX ORDER — FINASTERIDE 5 MG/1
TABLET, FILM COATED ORAL
Qty: 90 TABLET | Refills: 3 | Status: SHIPPED | OUTPATIENT
Start: 2019-10-18 | End: 2021-02-19

## 2019-10-18 RX ORDER — CITALOPRAM HYDROBROMIDE 40 MG/1
40 TABLET ORAL DAILY
Qty: 90 TABLET | Refills: 3 | Status: SHIPPED | OUTPATIENT
Start: 2019-10-18 | End: 2020-10-21

## 2019-10-18 RX ORDER — LISINOPRIL AND HYDROCHLOROTHIAZIDE 12.5; 2 MG/1; MG/1
1 TABLET ORAL DAILY
Qty: 90 TABLET | Refills: 3 | Status: SHIPPED | OUTPATIENT
Start: 2019-10-18 | End: 2020-09-15

## 2019-10-18 ASSESSMENT — PATIENT HEALTH QUESTIONNAIRE - PHQ9
10. IF YOU CHECKED OFF ANY PROBLEMS, HOW DIFFICULT HAVE THESE PROBLEMS MADE IT FOR YOU TO DO YOUR WORK, TAKE CARE OF THINGS AT HOME, OR GET ALONG WITH OTHER PEOPLE: NOT DIFFICULT AT ALL
SUM OF ALL RESPONSES TO PHQ QUESTIONS 1-9: 3
SUM OF ALL RESPONSES TO PHQ QUESTIONS 1-9: 3

## 2019-10-18 ASSESSMENT — PAIN SCALES - GENERAL: PAINLEVEL: MODERATE PAIN (4)

## 2019-10-18 NOTE — PROGRESS NOTES
Subjective     Victor M Mao is a 49 year old male who presents to clinic today for the following health issues:    Chief Complaint   Patient presents with     Hernia     diagnosied with a belly button hernia 1 month ago. Patient also has had pain in groin area for 1 year      Anxiety        Anxiety Follow-Up    How are you doing with your anxiety since your last visit? Improved     Are you having other symptoms that might be associated with anxiety? No    Have you had a significant life event? No     Are you feeling depressed? No    Do you have any concerns with your use of alcohol or other drugs? No    Social History     Tobacco Use     Smoking status: Never Smoker     Smokeless tobacco: Never Used   Substance Use Topics     Alcohol use: Yes     Comment: occasional 2 times week     Drug use: No     СВЕТЛАНА-7 SCORE 9/6/2017   Total Score 4     PHQ 12/14/2018 10/18/2019   PHQ-9 Total Score 7 3   Q9: Thoughts of better off dead/self-harm past 2 weeks Not at all Not at all       Patient notes that he has persistent groin pain which is modest.  It is more so present on the left than the right.  He was previously diagnosed as having umbilical hernia questions whether or not he might have inguinal hernias as well.  He also notes that he is having persistent esophageal dysphagia where it feels as if the food does not wish to go down.  He has a lot of nighttime heartburn and reflux symptoms.  He notes this does improve with persistent sitting up.  He does have a lot of anxiety associated with some recurrent depression.  This is fairly well controlled with the use of occasional lorazepam and daily Celexa.  Has ongoing erectile dysfunction which is controlled with the Viagra.  Also has some 8-year-old pattern balding for which he uses the Proscar without difficulty.          -------------------------------------    Patient Active Problem List   Diagnosis     Anxiety     Benign essential hypertension     Umbilical hernia without  obstruction and without gangrene     Past Surgical History:   Procedure Laterality Date     ESOPHAGOSCOPY, GASTROSCOPY, DUODENOSCOPY (EGD), COMBINED N/A 10/4/2019    Procedure: Esophagogastroduodenoscopy with Biopsy by Biopsy;  Surgeon: Charbel Jordan DO;  Location:  GI       Social History     Tobacco Use     Smoking status: Never Smoker     Smokeless tobacco: Never Used   Substance Use Topics     Alcohol use: Yes     Comment: occasional 2 times week     History reviewed. No pertinent family history.        -------------------------------------  Reviewed and updated as needed this visit by Provider         Review of Systems   ROS COMP: CONSTITUTIONAL: NEGATIVE for fever, chills, change in weight  INTEGUMENTARY/SKIN: NEGATIVE for worrisome rashes, moles or lesions  EYES: NEGATIVE for vision changes or irritation  ENT/MOUTH: NEGATIVE for ear, mouth and throat problems  RESP: NEGATIVE for significant cough or SOB  CV: NEGATIVE for chest pain, palpitations or peripheral edema  GI: Positive for reflux symptoms as noted.   male :negative for dysuria, hematuria, decreased urinary stream, erectile dysfunction and testicular pain minor and bilateral  MUSCULOSKELETAL: NEGATIVE for significant arthralgias or myalgia  NEURO: NEGATIVE for weakness, dizziness or paresthesias  ENDOCRINE: NEGATIVE for temperature intolerance, skin/hair changes  HEME: NEGATIVE for bleeding problems  PSYCHIATRIC: NEGATIVE for changes in mood or affect      Objective    /86 (BP Location: Left arm, Patient Position: Sitting, Cuff Size: Adult Large)   Pulse 84   Temp 97.5  F (36.4  C) (Temporal)   Resp 16   Wt 119.3 kg (263 lb)   SpO2 100%   BMI 32.87 kg/m    Body mass index is 32.87 kg/m .  Physical Exam   GENERAL: healthy, alert and no distress  EYES: Eyes grossly normal to inspection, PERRL and conjunctivae and sclerae normal  HENT: ear canals and TM's normal, nose and mouth without ulcers or lesions  NECK: no adenopathy,  no asymmetry, masses, or scars and thyroid normal to palpation  RESP: lungs clear to auscultation - no rales, rhonchi or wheezes  CV: regular rate and rhythm, normal S1 S2, no S3 or S4, no murmur, click or rub, no peripheral edema and peripheral pulses strong  ABDOMEN: soft, nontender, without hepatosplenomegaly or masses, bowel sounds normal and small umbilical hernia is noted.   (male): normal male genitalia without lesions or urethral discharge, no hernia  MS: no gross musculoskeletal defects noted, no edema  SKIN: no suspicious lesions or rashes  NEURO: Normal strength and tone, mentation intact and speech normal  PSYCH: mentation appears normal, affect normal/bright    Diagnostic Test Results:  Labs reviewed in Epic  Results for orders placed or performed during the hospital encounter of 10/04/19   UPPER GI ENDOSCOPY   Result Value Ref Range    Upper GI Endoscopy       [address] [address] [phone]     Endoscopy Department  _______________________________________________________________________________  Patient Name: Victor M Mao           Procedure Date: 10/4/2019 2:09 PM  MRN: [id number]                       Account Number: [id number]  YOB: 1970              Admit Type: Outpatient  Age: 49                               Room: Room 2  Gender: Male                          Note Status: Finalized  Attending MD: Charbel Jordan MD  Total Sedation Time:   _______________________________________________________________________________     Procedure:           Upper GI endoscopy  Indications:         Suspected esophageal reflux  Providers:           Charbel Jordan MD  Referring MD:          Medicines:           Monitored Anesthesia Care  Complications:       No immediate complications. Estimated blood loss:                        Minimal.  _____________________________________ __________________________________________  Procedure:            Pre-Anesthesia Assessment:                       - Prior to the procedure, a History and Physical was                        performed, and patient medications, allergies and                        sensitivities were reviewed. The patient's tolerance of                        previous anesthesia was reviewed.                       - The risks and benefits of the procedure and the                        sedation options and risks were discussed with the                        patient. All questions were answered and informed                        consent was obtained.                       - After reviewing the risks and benefits, the patient                        was deemed in satisfactory condition to undergo the                        procedure.                       After obtaining informed consent, the endoscope was                        passed under direct vision. Throughout the procedure,                        t he patient's blood pressure, pulse, and oxygen                        saturations were monitored continuously. The Endoscope                        was introduced through the mouth, and advanced to the                        third part of duodenum. The upper GI endoscopy was                        accomplished without difficulty. The patient tolerated                        the procedure well.                                                                                   Findings:       The Z-line was variable. Biopsies were taken with a cold forceps for        histology.       The entire examined stomach was normal.       The examined duodenum was normal.                                                                                   Impression:          - Z-line variable. Biopsied.                       - Normal stomach.                       - Normal examined duodenum.  Recommendation:      - Discharge patient to home.                       - Resume previous diet.                        " - Continue present medications.                       - Await pathology results.                       - Return to referring physician.                                                                                     _____________________  Diomedes Jordan MD  10/4/2019 2:27:06 PM  Number of Addenda: 0    Note Initiated On: 10/4/2019 2:09 PM     Surgical pathology exam   Result Value Ref Range    Copath Report       Patient Name: CAPRICE PATEL  MR#: [id number]  Specimen #: [id number]  Collected: 10/4/2019  Received: 10/7/2019  Reported: 10/9/2019 17:16  Ordering Phy(s): DIOMEDES JORDAN    For improved result formatting, select 'View Enhanced Report Format' under   Linked Documents section.    SPECIMEN(S):  Gastroesophageal junction biopsy    FINAL DIAGNOSIS:  Gastroesophageal junction, mucosal biopsy:  - Normal mature nonkeratinizing squamous epithelium.  - A small amount of benign mucous secreting glands without goblet cels.    Electronically signed out by:    Fortunato Wick M.D.    CLINICAL HISTORY:  49 year old male.  Suspected GERD.  On 10/4/2019 EGD reported as Z line   variable and normal stomach and  examined duodenum.  Rule out Muhammad's esophagus.    GROSS:  The specimen is received in formalin with proper patient identification   labeled \"gastroesophageal junction  biopsy\".  The specimen consists of pink tissue fragment measuring up to   0.3 cm.  The specimen is entirely  submitte d in one cassette. (Dictated by: Isael Alatorre 10/7/2019 03:10   PM)    MICROSCOPIC:  Microscopic examination is performed.  Multiple deeper tissue levels are   also examined microscopically.    The technical component of this testing was completed at the [facility], with the professional component performed   at the [facility], [address] [place] [phone] ([phone])    CPT " "Codes:  A: 47861-HC7    COLLECTION SITE:  Client[facility]  [facility]             Assessment & Plan     1. Umbilical hernia without obstruction and without gangrene  We will set up general surgery  - GENERAL SURG ADULT REFERRAL    2. Epididymitis  Mild bilateral tenderness in the epididymis, worse on left than right.  - ciprofloxacin (CIPRO) 500 MG tablet; Take 1 tablet (500 mg) by mouth 2 times daily  Dispense: 28 tablet; Refill: 0    3. Anxiety  Continue lorazepam on a sparing basis  - LORazepam (ATIVAN) 0.5 MG tablet; Take 2 tablets (1 mg) by mouth every 8 hours as needed for anxiety  Dispense: 60 tablet; Refill: 0    4. Recurrent major depressive disorder, in partial remission (H)  Citalopram daily  - citalopram (CELEXA) 40 MG tablet; Take 1 tablet (40 mg) by mouth daily  Dispense: 90 tablet; Refill: 3    5. Male pattern baldness  Continue Proscar  - finasteride (PROSCAR) 5 MG tablet; Take 1 pill daily  Dispense: 90 tablet; Refill: 3    6. Benign essential hypertension  Controlled, continue current medication  - lisinopril-hydrochlorothiazide (PRINZIDE/ZESTORETIC) 20-12.5 MG tablet; Take 1 tablet by mouth daily  Dispense: 90 tablet; Refill: 3    7. Gastroesophageal reflux disease with esophagitis  Continue PPI, elevate head of the bed  - omeprazole (PRILOSEC) 40 MG DR capsule; Take 1 capsule (40 mg) by mouth daily  Dispense: 90 capsule; Refill: 3    8. Esophageal dysphagia  As above  - omeprazole (PRILOSEC) 40 MG DR capsule; Take 1 capsule (40 mg) by mouth daily  Dispense: 90 capsule; Refill: 3    9. Erectile dysfunction, unspecified erectile dysfunction type  Use as needed  - sildenafil (VIAGRA) 100 MG tablet; Take 1 tablet (100 mg) by mouth daily as needed (Erectile dysfunction)  Dispense: 10 tablet; Refill: 3     BMI:   Estimated body mass index is 32.87 kg/m  as calculated from the following:    Height as of 9/6/19: 1.905 m (6' 3\").    Weight as of this encounter: 119.3 kg (263 " lb).   Weight management plan: Discussed healthy diet and exercise guidelines                                 FOLLOW UP   I have asked the patient to make an appointment for followup with me in 2 weeks      Lee Chowdhury, [facility]        Answers for HPI/ROS submitted by the patient on 10/18/2019   If you checked off any problems, how difficult have these problems made it for you to do your work, take care of things at home, or get along with other people?: Not difficult at all  PHQ9 TOTAL SCORE: 3

## 2019-10-19 ASSESSMENT — PATIENT HEALTH QUESTIONNAIRE - PHQ9: SUM OF ALL RESPONSES TO PHQ QUESTIONS 1-9: 3

## 2019-10-25 ENCOUNTER — OFFICE VISIT (OUTPATIENT)
Dept: SURGERY | Facility: CLINIC | Age: 49
End: 2019-10-25
Payer: COMMERCIAL

## 2019-10-25 ENCOUNTER — TELEPHONE (OUTPATIENT)
Dept: SURGERY | Facility: CLINIC | Age: 49
End: 2019-10-25

## 2019-10-25 VITALS
DIASTOLIC BLOOD PRESSURE: 84 MMHG | WEIGHT: 263 LBS | BODY MASS INDEX: 32.7 KG/M2 | HEIGHT: 75 IN | TEMPERATURE: 96.8 F | SYSTOLIC BLOOD PRESSURE: 128 MMHG

## 2019-10-25 DIAGNOSIS — K42.9 UMBILICAL HERNIA WITHOUT OBSTRUCTION AND WITHOUT GANGRENE: Primary | ICD-10-CM

## 2019-10-25 PROCEDURE — 99214 OFFICE O/P EST MOD 30 MIN: CPT | Performed by: SURGERY

## 2019-10-25 RX ORDER — CEFAZOLIN SODIUM 2 G/100ML
2 INJECTION, SOLUTION INTRAVENOUS
Status: CANCELLED | OUTPATIENT
Start: 2019-10-25

## 2019-10-25 RX ORDER — CEFAZOLIN SODIUM 1 G/50ML
1 SOLUTION INTRAVENOUS SEE ADMIN INSTRUCTIONS
Status: CANCELLED | OUTPATIENT
Start: 2019-10-25

## 2019-10-25 ASSESSMENT — MIFFLIN-ST. JEOR: SCORE: 2148.59

## 2019-10-25 NOTE — LETTER
10/25/2019         RE: Victor M Mao  [address]  [place] [address]        Dear Colleague,    Thank you for referring your patient, Victor M Mao, to the [facility]. Please see a copy of my visit note below.    Patient seen in consultation for umbilical hernia by Lee Chowdhury    HPI:  Patient is a 49 year old male with known history of umbilical hernia here to discuss surgical options for repair. He states that he has had the hernia for some time now but recently has noticed with a cough or a sneeze there is pain in the cherelle-umbilical area. He has issues with constipation but no obstruction. He denies ever having any significant firm bulge in the umbilicus that he has had to reduce on his own. He has lost 20lbs this year with diet and exercise. He denies any prior abdominal operations.    Review Of Systems    Skin: negative  Ears/Nose/Throat: negative  Respiratory: No shortness of breath, dyspnea on exertion, cough, or hemoptysis  Cardiovascular: negative  Gastrointestinal: as above  Genitourinary: negative  Musculoskeletal: negative  Neurologic: negative  Hematologic/Lymphatic/Immunologic: negative  Endocrine: negative      No past medical history on file.    Past Surgical History:   Procedure Laterality Date     ESOPHAGOSCOPY, GASTROSCOPY, DUODENOSCOPY (EGD), COMBINED N/A 10/4/2019    Procedure: Esophagogastroduodenoscopy with Biopsy by Biopsy;  Surgeon: Charbel Jordan DO;  Location:  GI       No family history on file.    Social History     Socioeconomic History     Marital status: Single     Spouse name: Not on file     Number of children: Not on file     Years of education: Not on file     Highest education level: Not on file   Occupational History     Not on file   Social Needs     Financial resource strain: Not on file     Food insecurity:     Worry: Not on file     Inability: Not on file     Transportation needs:     Medical: Not on file      "Non-medical: Not on file   Tobacco Use     Smoking status: Never Smoker     Smokeless tobacco: Never Used   Substance and Sexual Activity     Alcohol use: Yes     Comment: occasional 2 times week     Drug use: No     Sexual activity: Yes     Partners: Female   Lifestyle     Physical activity:     Days per week: Not on file     Minutes per session: Not on file     Stress: Not on file   Relationships     Social connections:     Talks on phone: Not on file     Gets together: Not on file     Attends Rastafari service: Not on file     Active member of club or organization: Not on file     Attends meetings of clubs or organizations: Not on file     Relationship status: Not on file     Intimate partner violence:     Fear of current or ex partner: Not on file     Emotionally abused: Not on file     Physically abused: Not on file     Forced sexual activity: Not on file   Other Topics Concern     Not on file   Social History Narrative     Not on file       Current Outpatient Medications   Medication Sig Dispense Refill     ciprofloxacin (CIPRO) 500 MG tablet Take 1 tablet (500 mg) by mouth 2 times daily 28 tablet 0     citalopram (CELEXA) 40 MG tablet Take 1 tablet (40 mg) by mouth daily 90 tablet 3     finasteride (PROSCAR) 5 MG tablet Take 1 pill daily 90 tablet 3     lisinopril-hydrochlorothiazide (PRINZIDE/ZESTORETIC) 20-12.5 MG tablet Take 1 tablet by mouth daily 90 tablet 3     LORazepam (ATIVAN) 0.5 MG tablet Take 2 tablets (1 mg) by mouth every 8 hours as needed for anxiety 60 tablet 0     omeprazole (PRILOSEC) 40 MG DR capsule Take 1 capsule (40 mg) by mouth daily 90 capsule 3     sildenafil (VIAGRA) 100 MG tablet Take 1 tablet (100 mg) by mouth daily as needed (Erectile dysfunction) 10 tablet 3       Medications and history reviewed    Physical exam:  Vitals: /84   Temp 96.8  F (36  C) (Temporal)   Ht 1.905 m (6' 3\")   Wt 119.3 kg (263 lb)   BMI 32.87 kg/m     BMI= Body mass index is 32.87 " kg/m .    Constitutional: Healthy, alert, non-distressed   Head: Normo-cephalic, atraumatic, no lesions, masses or tenderness   Cardiovascular: RRR, no new murmurs, +S1, +S2 heart sounds, no clicks, rubs or gallops   Respiratory: CTAB, no rales, rhonchi or wheezing, equal chest rise, good respiratory effort   Gastrointestinal: Soft, non distended, no rebound rigidity or guarding, small palpable umbilical hernia with pain when palpating the hernia defect. Defect is small ~1cm, he also has mild diastasis recti of the upper abdomen  : Deferred  Musculoskeletal: Moves all extremities, normal  strength, no deformities noted   Skin: No suspicious lesions or rashes   Psychiatric: Mentation appears normal, affect appropriate   Hematologic/Lymphatic/Immunologic: Normal cervical and supraclavicular lymph nodes   Patient able to get up on table without difficulty.    Labs show:  No results found for this or any previous visit (from the past 24 hour(s)).      Assessment:     ICD-10-CM    1. Umbilical hernia without obstruction and without gangrene K42.9 Case Request: HERNIORRHAPHY, UMBILICAL, LAPAROSCOPIC, possible open     Case Request: HERNIORRHAPHY, UMBILICAL, LAPAROSCOPIC, possible open     Plan: I recommend laparoscopic umbilical hernia repair with mesh, possible open for his symptomatic umbilical hernia. He also has a diastasis that could be partially reinforced with mesh with a laparoscopic approach. We discussed the procedure in detail. We also discussed the risks, benefits, alternatives and post-op care and restrictions. After our informed discussion we decided to proceed with the proposed surgery.    Charbel Jordan DO      Again, thank you for allowing me to participate in the care of your patient.        Sincerely,        Charbel Jordan DO

## 2019-10-25 NOTE — TELEPHONE ENCOUNTER
Type of surgery: HERNIORRHAPHY, UMBILICAL, LAPAROSCOPIC, possible open (N/A)  Location of surgery: [facility] OR  Date and time of surgery: 11/14  Surgeon: Julian  Pre-Op Appt Date: 11/8  Post-Op Appt Date: 11/27   Packet sent out: Yes  Pre-cert/Authorization completed:  Not Applicable  Date: na

## 2019-10-25 NOTE — PROGRESS NOTES
Patient seen in consultation for umbilical hernia by Lee Chowdhury    HPI:  Patient is a 49 year old male with known history of umbilical hernia here to discuss surgical options for repair. He states that he has had the hernia for some time now but recently has noticed with a cough or a sneeze there is pain in the cherelle-umbilical area. He has issues with constipation but no obstruction. He denies ever having any significant firm bulge in the umbilicus that he has had to reduce on his own. He has lost 20lbs this year with diet and exercise. He denies any prior abdominal operations.    Review Of Systems    Skin: negative  Ears/Nose/Throat: negative  Respiratory: No shortness of breath, dyspnea on exertion, cough, or hemoptysis  Cardiovascular: negative  Gastrointestinal: as above  Genitourinary: negative  Musculoskeletal: negative  Neurologic: negative  Hematologic/Lymphatic/Immunologic: negative  Endocrine: negative      No past medical history on file.    Past Surgical History:   Procedure Laterality Date     ESOPHAGOSCOPY, GASTROSCOPY, DUODENOSCOPY (EGD), COMBINED N/A 10/4/2019    Procedure: Esophagogastroduodenoscopy with Biopsy by Biopsy;  Surgeon: Charbel Jordan DO;  Location: [facility]       No family history on file.    Social History     Socioeconomic History     Marital status: Single     Spouse name: Not on file     Number of children: Not on file     Years of education: Not on file     Highest education level: Not on file   Occupational History     Not on file   Social Needs     Financial resource strain: Not on file     Food insecurity:     Worry: Not on file     Inability: Not on file     Transportation needs:     Medical: Not on file     Non-medical: Not on file   Tobacco Use     Smoking status: Never Smoker     Smokeless tobacco: Never Used   Substance and Sexual Activity     Alcohol use: Yes     Comment: occasional 2 times week     Drug use: No     Sexual activity: Yes     Partners:  "Female   Lifestyle     Physical activity:     Days per week: Not on file     Minutes per session: Not on file     Stress: Not on file   Relationships     Social connections:     Talks on phone: Not on file     Gets together: Not on file     Attends Jewish service: Not on file     Active member of club or organization: Not on file     Attends meetings of clubs or organizations: Not on file     Relationship status: Not on file     Intimate partner violence:     Fear of current or ex partner: Not on file     Emotionally abused: Not on file     Physically abused: Not on file     Forced sexual activity: Not on file   Other Topics Concern     Not on file   Social History Narrative     Not on file       Current Outpatient Medications   Medication Sig Dispense Refill     ciprofloxacin (CIPRO) 500 MG tablet Take 1 tablet (500 mg) by mouth 2 times daily 28 tablet 0     citalopram (CELEXA) 40 MG tablet Take 1 tablet (40 mg) by mouth daily 90 tablet 3     finasteride (PROSCAR) 5 MG tablet Take 1 pill daily 90 tablet 3     lisinopril-hydrochlorothiazide (PRINZIDE/ZESTORETIC) 20-12.5 MG tablet Take 1 tablet by mouth daily 90 tablet 3     LORazepam (ATIVAN) 0.5 MG tablet Take 2 tablets (1 mg) by mouth every 8 hours as needed for anxiety 60 tablet 0     omeprazole (PRILOSEC) 40 MG DR capsule Take 1 capsule (40 mg) by mouth daily 90 capsule 3     sildenafil (VIAGRA) 100 MG tablet Take 1 tablet (100 mg) by mouth daily as needed (Erectile dysfunction) 10 tablet 3       Medications and history reviewed    Physical exam:  Vitals: /84   Temp 96.8  F (36  C) (Temporal)   Ht 1.905 m (6' 3\")   Wt 119.3 kg (263 lb)   BMI 32.87 kg/m    BMI= Body mass index is 32.87 kg/m .    Constitutional: Healthy, alert, non-distressed   Head: Normo-cephalic, atraumatic, no lesions, masses or tenderness   Cardiovascular: RRR, no new murmurs, +S1, +S2 heart sounds, no clicks, rubs or gallops   Respiratory: CTAB, no rales, rhonchi or wheezing, " equal chest rise, good respiratory effort   Gastrointestinal: Soft, non distended, no rebound rigidity or guarding, small palpable umbilical hernia with pain when palpating the hernia defect. Defect is small ~1cm, he also has mild diastasis recti of the upper abdomen  : Deferred  Musculoskeletal: Moves all extremities, normal  strength, no deformities noted   Skin: No suspicious lesions or rashes   Psychiatric: Mentation appears normal, affect appropriate   Hematologic/Lymphatic/Immunologic: Normal cervical and supraclavicular lymph nodes   Patient able to get up on table without difficulty.    Labs show:  No results found for this or any previous visit (from the past 24 hour(s)).      Assessment:     ICD-10-CM    1. Umbilical hernia without obstruction and without gangrene K42.9 Case Request: HERNIORRHAPHY, UMBILICAL, LAPAROSCOPIC, possible open     Case Request: HERNIORRHAPHY, UMBILICAL, LAPAROSCOPIC, possible open     Plan: I recommend laparoscopic umbilical hernia repair with mesh, possible open for his symptomatic umbilical hernia. He also has a diastasis that could be partially reinforced with mesh with a laparoscopic approach. We discussed the procedure in detail. We also discussed the risks, benefits, alternatives and post-op care and restrictions. After our informed discussion we decided to proceed with the proposed surgery.    Charbel Jordan,

## 2019-11-08 ENCOUNTER — OFFICE VISIT (OUTPATIENT)
Dept: FAMILY MEDICINE | Facility: OTHER | Age: 49
End: 2019-11-08
Payer: COMMERCIAL

## 2019-11-08 VITALS
OXYGEN SATURATION: 100 % | TEMPERATURE: 97.3 F | RESPIRATION RATE: 18 BRPM | HEART RATE: 100 BPM | DIASTOLIC BLOOD PRESSURE: 86 MMHG | BODY MASS INDEX: 32.46 KG/M2 | HEIGHT: 75 IN | SYSTOLIC BLOOD PRESSURE: 132 MMHG | WEIGHT: 261.1 LBS

## 2019-11-08 DIAGNOSIS — K42.9 UMBILICAL HERNIA WITHOUT OBSTRUCTION AND WITHOUT GANGRENE: ICD-10-CM

## 2019-11-08 DIAGNOSIS — I10 BENIGN ESSENTIAL HYPERTENSION: ICD-10-CM

## 2019-11-08 DIAGNOSIS — Z01.818 PREOP GENERAL PHYSICAL EXAM: Primary | ICD-10-CM

## 2019-11-08 LAB
ALBUMIN UR-MCNC: NEGATIVE MG/DL
APPEARANCE UR: CLEAR
BILIRUB UR QL STRIP: NEGATIVE
COLOR UR AUTO: YELLOW
ERYTHROCYTE [DISTWIDTH] IN BLOOD BY AUTOMATED COUNT: 13.3 % (ref 10–15)
GLUCOSE UR STRIP-MCNC: NEGATIVE MG/DL
HCT VFR BLD AUTO: 43.4 % (ref 40–53)
HGB BLD-MCNC: 14.9 G/DL (ref 13.3–17.7)
HGB UR QL STRIP: ABNORMAL
KETONES UR STRIP-MCNC: NEGATIVE MG/DL
LEUKOCYTE ESTERASE UR QL STRIP: NEGATIVE
MCH RBC QN AUTO: 30.3 PG (ref 26.5–33)
MCHC RBC AUTO-ENTMCNC: 34.3 G/DL (ref 31.5–36.5)
MCV RBC AUTO: 88 FL (ref 78–100)
NITRATE UR QL: NEGATIVE
PH UR STRIP: 6.5 PH (ref 5–7)
PLATELET # BLD AUTO: 169 10E9/L (ref 150–450)
RBC # BLD AUTO: 4.91 10E12/L (ref 4.4–5.9)
RBC #/AREA URNS AUTO: NORMAL /HPF
SOURCE: ABNORMAL
SP GR UR STRIP: >1.03 (ref 1–1.03)
UROBILINOGEN UR STRIP-ACNC: 0.2 EU/DL (ref 0.2–1)
WBC # BLD AUTO: 5.3 10E9/L (ref 4–11)
WBC #/AREA URNS AUTO: NORMAL /HPF

## 2019-11-08 PROCEDURE — 81001 URINALYSIS AUTO W/SCOPE: CPT | Performed by: INTERNAL MEDICINE

## 2019-11-08 PROCEDURE — 85027 COMPLETE CBC AUTOMATED: CPT | Performed by: INTERNAL MEDICINE

## 2019-11-08 PROCEDURE — 36415 COLL VENOUS BLD VENIPUNCTURE: CPT | Performed by: INTERNAL MEDICINE

## 2019-11-08 PROCEDURE — 99214 OFFICE O/P EST MOD 30 MIN: CPT | Performed by: INTERNAL MEDICINE

## 2019-11-08 ASSESSMENT — PAIN SCALES - GENERAL: PAINLEVEL: NO PAIN (0)

## 2019-11-08 ASSESSMENT — MIFFLIN-ST. JEOR: SCORE: 2139.97

## 2019-11-08 NOTE — PROGRESS NOTES
[facility]  [address] [facility] [address]  [phone]  Dept: [phone]    PRE-OP EVALUATION:  Today's date: 2019    Victor M Mao (: 1970) presents for pre-operative evaluation assessment as requested by Dr. Jordan.  He requires evaluation and anesthesia risk assessment prior to undergoing surgery/procedure for treatment of hernia .    Primary Physician: Lee Chowdhury  Type of Anesthesia Anticipated: General    Patient has a Health Care Directive or Living Will:  NO    Preop Questions 2019   Who is doing your surgery? [facility]   What are you having done? umbical herina   Date of Surgery/Procedure: 2019   Facility or Hospital where procedure/surgery will be performed: [facility] same day surgery   1.  Do you have a history of Heart attack, stroke, stent, coronary bypass surgery, or other heart surgery? No   2.  Do you ever have any pain or discomfort in your chest? No   3.  Do you have a history of  Heart Failure? No   4.   Are you troubled by shortness of breath when:  walking on a level surface, or up a slight hill, or at night? No   5.  Do you currently have a cold, bronchitis or other respiratory infection? No   6.  Do you have a cough, shortness of breath, or wheezing? No   7.  Do you sometimes get pains in the calves of your legs when you walk? No   8. Do you or anyone in your family have previous history of blood clots? No   9.  Do you or does anyone in your family have a serious bleeding problem such as prolonged bleeding following surgeries or cuts? No   10. Have you ever had problems with anemia or been told to take iron pills? UNKNOWN - mild low iron in the past, never took iron pills    11. Have you had any abnormal blood loss such as black, tarry or bloody stools? No   12. Have you ever had a blood transfusion? No   13. Have you or any of your relatives ever had problems with anesthesia? No   14. Do you have sleep  apnea, excessive snoring or daytime drowsiness? YES - has sleep apnea     15. Do you have any prosthetic heart valves? No   16. Do you have prosthetic joints? No         HPI:     HPI related to upcoming procedure: Patient has a painful and nonreducible fat-containing umbilical hernia.  He is decided to pursue surgical intervention after discussion of the risks and benefits with his surgeon.      See problem list for active medical problems.  Problems all longstanding and stable, except as noted/documented.  See ROS for pertinent symptoms related to these conditions.      MEDICAL HISTORY:     Patient Active Problem List    Diagnosis Date Noted     Umbilical hernia without obstruction and without gangrene 09/06/2019     Priority: Medium     Anxiety 10/30/2017     Priority: Medium     Benign essential hypertension 10/30/2017     Priority: Medium      History reviewed. No pertinent past medical history.  Past Surgical History:   Procedure Laterality Date     ESOPHAGOSCOPY, GASTROSCOPY, DUODENOSCOPY (EGD), COMBINED N/A 10/4/2019    Procedure: Esophagogastroduodenoscopy with Biopsy by Biopsy;  Surgeon: Charbel Jordan, ;  Location:  GI     Current Outpatient Medications   Medication Sig Dispense Refill     ciprofloxacin (CIPRO) 500 MG tablet Take 1 tablet (500 mg) by mouth 2 times daily 28 tablet 0     finasteride (PROSCAR) 5 MG tablet Take 1 pill daily 90 tablet 3     lisinopril-hydrochlorothiazide (PRINZIDE/ZESTORETIC) 20-12.5 MG tablet Take 1 tablet by mouth daily 90 tablet 3     LORazepam (ATIVAN) 0.5 MG tablet Take 2 tablets (1 mg) by mouth every 8 hours as needed for anxiety 60 tablet 0     omeprazole (PRILOSEC) 40 MG DR capsule Take 1 capsule (40 mg) by mouth daily 90 capsule 3     sildenafil (VIAGRA) 100 MG tablet Take 1 tablet (100 mg) by mouth daily as needed (Erectile dysfunction) 10 tablet 3     citalopram (CELEXA) 40 MG tablet Take 1 tablet (40 mg) by mouth daily 90 tablet 3     OTC products:  "None, except as noted above    Allergies   Allergen Reactions     Sulfa Drugs Anaphylaxis     Tetanus Toxoid      Other reaction(s): Wheezing      Latex Allergy: NO    Social History     Tobacco Use     Smoking status: Never Smoker     Smokeless tobacco: Never Used   Substance Use Topics     Alcohol use: Yes     Comment: occasional 2 times week     History   Drug Use No       REVIEW OF SYSTEMS:   CONSTITUTIONAL: NEGATIVE for fever, chills, change in weight  INTEGUMENTARY/SKIN: NEGATIVE for worrisome rashes, moles or lesions  EYES: NEGATIVE for vision changes or irritation  ENT/MOUTH: NEGATIVE for ear, mouth and throat problems  RESP: NEGATIVE for significant cough or SOB  CV: NEGATIVE for chest pain, palpitations or peripheral edema  GI: Positive for some umbilical and periumbilical discomfort without signs of infection or inflammation.  Negative for any gastrointestinal symptomatology  : NEGATIVE for frequency, dysuria, or hematuria  MUSCULOSKELETAL: NEGATIVE for significant arthralgias or myalgia  NEURO: NEGATIVE for weakness, dizziness or paresthesias  ENDOCRINE: NEGATIVE for temperature intolerance, skin/hair changes  HEME: NEGATIVE for bleeding problems  PSYCHIATRIC: NEGATIVE for changes in mood or affect    EXAM:   /86 (BP Location: Left arm, Patient Position: Sitting, Cuff Size: Adult Regular)   Pulse 100   Temp 97.3  F (36.3  C) (Temporal)   Resp 18   Ht 1.905 m (6' 3\")   Wt 118.4 kg (261 lb 1.6 oz)   SpO2 100%   BMI 32.64 kg/m      GENERAL APPEARANCE: healthy, alert and no distress     EYES: EOMI,  PERRL     HENT: ear canals and TM's normal and nose and mouth without ulcers or lesions     NECK: no adenopathy, no asymmetry, masses, or scars and thyroid normal to palpation     RESP: lungs clear to auscultation - no rales, rhonchi or wheezes     CV: regular rates and rhythm, normal S1 S2, no S3 or S4 and no murmur, click or rub     ABDOMEN: Abdomen is soft without rebound, rigidity or guarding.  " A small superior umbilical hernia is not reducible at this time.     MS: extremities normal- no gross deformities noted, no evidence of inflammation in joints, FROM in all extremities.     SKIN: no suspicious lesions or rashes     NEURO: Normal strength and tone, sensory exam grossly normal, mentation intact and speech normal     PSYCH: mentation appears normal. and affect normal/bright     LYMPHATICS: No cervical adenopathy    DIAGNOSTICS:   EKG and recent cardiac stress testing is noted to be normal.  Available laboratory has been reviewed.  Remaining laboratories pending in the EMR.      IMPRESSION:   Reason for surgery/procedure: Umbilical hernia with persistent discomfort and pain.  Diagnosis/reason for consult: Perioperative risk assessment in a pleasant 49-year-old gentleman with:  1. Preop general physical exam  - CBC with platelets  - *UA reflex to Microscopic and Culture ([facility] (except [place] and [place])  - Urine Microscopic    2. Umbilical hernia without obstruction and without gangrene    3. Benign essential hypertension      The proposed surgical procedure is considered LOW risk.    REVISED CARDIAC RISK INDEX  The patient has the following serious cardiovascular risks for perioperative complications such as (MI, PE, VFib and 3  AV Block):  No serious cardiac risks  INTERPRETATION: 1 risks: Class II (low risk - 0.9% complication rate)    The patient has the following additional risks for perioperative complications:  No identified additional risks      ICD-10-CM    1. Preop general physical exam Z01.818        RECOMMENDATIONS:         --Patient is to take all scheduled medications on the day of surgery.        APPROVAL GIVEN to proceed with proposed procedure, without further diagnostic evaluation       Signed Electronically by: Lee Chowdhury DO    Copy of this evaluation report is provided to requesting physician.    [facility] Preop Guidelines    Revised Cardiac  Risk Index

## 2019-11-13 ENCOUNTER — ANESTHESIA EVENT (OUTPATIENT)
Dept: SURGERY | Facility: CLINIC | Age: 49
End: 2019-11-13
Payer: COMMERCIAL

## 2019-11-14 ENCOUNTER — NURSE TRIAGE (OUTPATIENT)
Dept: NURSING | Facility: CLINIC | Age: 49
End: 2019-11-14

## 2019-11-14 ENCOUNTER — HOSPITAL ENCOUNTER (OUTPATIENT)
Facility: CLINIC | Age: 49
Discharge: HOME | End: 2019-11-14
Attending: SURGERY | Admitting: SURGERY
Payer: COMMERCIAL

## 2019-11-14 ENCOUNTER — ANESTHESIA (OUTPATIENT)
Dept: SURGERY | Facility: CLINIC | Age: 49
End: 2019-11-14
Payer: COMMERCIAL

## 2019-11-14 VITALS
HEART RATE: 98 BPM | DIASTOLIC BLOOD PRESSURE: 83 MMHG | OXYGEN SATURATION: 93 % | RESPIRATION RATE: 16 BRPM | SYSTOLIC BLOOD PRESSURE: 134 MMHG | TEMPERATURE: 98 F

## 2019-11-14 DIAGNOSIS — Z98.890 S/P LAPAROSCOPIC HERNIA REPAIR: Primary | ICD-10-CM

## 2019-11-14 DIAGNOSIS — Z87.19 S/P LAPAROSCOPIC HERNIA REPAIR: Primary | ICD-10-CM

## 2019-11-14 DIAGNOSIS — K42.9 UMBILICAL HERNIA WITHOUT OBSTRUCTION AND WITHOUT GANGRENE: ICD-10-CM

## 2019-11-14 PROCEDURE — C1781 MESH (IMPLANTABLE): HCPCS | Performed by: SURGERY

## 2019-11-14 PROCEDURE — 25000132 ZZH RX MED GY IP 250 OP 250 PS 637: Performed by: SURGERY

## 2019-11-14 PROCEDURE — 25000125 ZZHC RX 250: Performed by: NURSE ANESTHETIST, CERTIFIED REGISTERED

## 2019-11-14 PROCEDURE — 36000056 ZZH SURGERY LEVEL 3 1ST 30 MIN: Performed by: SURGERY

## 2019-11-14 PROCEDURE — 71000015 ZZH RECOVERY PHASE 1 LEVEL 2 EA ADDTL HR: Performed by: SURGERY

## 2019-11-14 PROCEDURE — 25000128 H RX IP 250 OP 636: Performed by: NURSE ANESTHETIST, CERTIFIED REGISTERED

## 2019-11-14 PROCEDURE — 25000125 ZZHC RX 250: Performed by: SURGERY

## 2019-11-14 PROCEDURE — 71000014 ZZH RECOVERY PHASE 1 LEVEL 2 FIRST HR: Performed by: SURGERY

## 2019-11-14 PROCEDURE — 40000306 ZZH STATISTIC PRE PROC ASSESS II: Performed by: SURGERY

## 2019-11-14 PROCEDURE — 49652 ZZHC LAP VENT/ABD HERNIA REPAIR: CPT | Performed by: SURGERY

## 2019-11-14 PROCEDURE — 37000008 ZZH ANESTHESIA TECHNICAL FEE, 1ST 30 MIN: Performed by: SURGERY

## 2019-11-14 PROCEDURE — 25000128 H RX IP 250 OP 636: Performed by: SURGERY

## 2019-11-14 PROCEDURE — 25800030 ZZH RX IP 258 OP 636: Performed by: NURSE ANESTHETIST, CERTIFIED REGISTERED

## 2019-11-14 PROCEDURE — 27210794 ZZH OR GENERAL SUPPLY STERILE: Performed by: SURGERY

## 2019-11-14 PROCEDURE — 71000027 ZZH RECOVERY PHASE 2 EACH 15 MINS: Performed by: SURGERY

## 2019-11-14 PROCEDURE — 25000566 ZZH SEVOFLURANE, EA 15 MIN: Performed by: SURGERY

## 2019-11-14 PROCEDURE — 37000009 ZZH ANESTHESIA TECHNICAL FEE, EACH ADDTL 15 MIN: Performed by: SURGERY

## 2019-11-14 PROCEDURE — 25000564 ZZH DESFLURANE, EA 15 MIN: Performed by: SURGERY

## 2019-11-14 PROCEDURE — 27810325 ZZHC OR IMPLANT OTHER OPNP: Performed by: SURGERY

## 2019-11-14 PROCEDURE — 36000058 ZZH SURGERY LEVEL 3 EA 15 ADDTL MIN: Performed by: SURGERY

## 2019-11-14 DEVICE — IMPLANTABLE DEVICE: Type: IMPLANTABLE DEVICE | Site: UMBILICAL | Status: FUNCTIONAL

## 2019-11-14 RX ORDER — FENTANYL CITRATE 50 UG/ML
25-50 INJECTION, SOLUTION INTRAMUSCULAR; INTRAVENOUS
Status: DISCONTINUED | OUTPATIENT
Start: 2019-11-14 | End: 2019-11-14 | Stop reason: HOSPADM

## 2019-11-14 RX ORDER — FENTANYL CITRATE 50 UG/ML
INJECTION, SOLUTION INTRAMUSCULAR; INTRAVENOUS PRN
Status: DISCONTINUED | OUTPATIENT
Start: 2019-11-14 | End: 2019-11-14

## 2019-11-14 RX ORDER — OXYCODONE AND ACETAMINOPHEN 5; 325 MG/1; MG/1
1 TABLET ORAL
Status: COMPLETED | OUTPATIENT
Start: 2019-11-14 | End: 2019-11-14

## 2019-11-14 RX ORDER — SODIUM CHLORIDE, SODIUM LACTATE, POTASSIUM CHLORIDE, CALCIUM CHLORIDE 600; 310; 30; 20 MG/100ML; MG/100ML; MG/100ML; MG/100ML
INJECTION, SOLUTION INTRAVENOUS CONTINUOUS
Status: DISCONTINUED | OUTPATIENT
Start: 2019-11-14 | End: 2019-11-14 | Stop reason: HOSPADM

## 2019-11-14 RX ORDER — HYDRALAZINE HYDROCHLORIDE 20 MG/ML
2.5-5 INJECTION INTRAMUSCULAR; INTRAVENOUS EVERY 10 MIN PRN
Status: DISCONTINUED | OUTPATIENT
Start: 2019-11-14 | End: 2019-11-14 | Stop reason: HOSPADM

## 2019-11-14 RX ORDER — CEFAZOLIN SODIUM 1 G/3ML
1 INJECTION, POWDER, FOR SOLUTION INTRAMUSCULAR; INTRAVENOUS SEE ADMIN INSTRUCTIONS
Status: DISCONTINUED | OUTPATIENT
Start: 2019-11-14 | End: 2019-11-14 | Stop reason: HOSPADM

## 2019-11-14 RX ORDER — ONDANSETRON 2 MG/ML
4 INJECTION INTRAMUSCULAR; INTRAVENOUS EVERY 30 MIN PRN
Status: DISCONTINUED | OUTPATIENT
Start: 2019-11-14 | End: 2019-11-14 | Stop reason: HOSPADM

## 2019-11-14 RX ORDER — ONDANSETRON 4 MG/1
4 TABLET, ORALLY DISINTEGRATING ORAL EVERY 30 MIN PRN
Status: DISCONTINUED | OUTPATIENT
Start: 2019-11-14 | End: 2019-11-14 | Stop reason: HOSPADM

## 2019-11-14 RX ORDER — LIDOCAINE 40 MG/G
CREAM TOPICAL
Status: DISCONTINUED | OUTPATIENT
Start: 2019-11-14 | End: 2019-11-14 | Stop reason: HOSPADM

## 2019-11-14 RX ORDER — MEPERIDINE HYDROCHLORIDE 25 MG/ML
25-50 INJECTION INTRAMUSCULAR; INTRAVENOUS; SUBCUTANEOUS
Status: COMPLETED | OUTPATIENT
Start: 2019-11-14 | End: 2019-11-14

## 2019-11-14 RX ORDER — DIMENHYDRINATE 50 MG/ML
25 INJECTION, SOLUTION INTRAMUSCULAR; INTRAVENOUS
Status: DISCONTINUED | OUTPATIENT
Start: 2019-11-14 | End: 2019-11-14 | Stop reason: HOSPADM

## 2019-11-14 RX ORDER — PROPOFOL 10 MG/ML
INJECTION, EMULSION INTRAVENOUS PRN
Status: DISCONTINUED | OUTPATIENT
Start: 2019-11-14 | End: 2019-11-14

## 2019-11-14 RX ORDER — OXYCODONE AND ACETAMINOPHEN 5; 325 MG/1; MG/1
1-2 TABLET ORAL EVERY 4 HOURS PRN
Qty: 16 TABLET | Refills: 0 | Status: SHIPPED | OUTPATIENT
Start: 2019-11-14 | End: 2020-04-21

## 2019-11-14 RX ORDER — GLYCOPYRROLATE 0.2 MG/ML
INJECTION, SOLUTION INTRAMUSCULAR; INTRAVENOUS PRN
Status: DISCONTINUED | OUTPATIENT
Start: 2019-11-14 | End: 2019-11-14

## 2019-11-14 RX ORDER — HYDROXYZINE HYDROCHLORIDE 50 MG/ML
50 INJECTION, SOLUTION INTRAMUSCULAR EVERY 6 HOURS PRN
Status: DISCONTINUED | OUTPATIENT
Start: 2019-11-14 | End: 2019-11-14 | Stop reason: HOSPADM

## 2019-11-14 RX ORDER — ONDANSETRON 2 MG/ML
INJECTION INTRAMUSCULAR; INTRAVENOUS PRN
Status: DISCONTINUED | OUTPATIENT
Start: 2019-11-14 | End: 2019-11-14

## 2019-11-14 RX ORDER — CEFAZOLIN SODIUM 2 G/100ML
2 INJECTION, SOLUTION INTRAVENOUS
Status: COMPLETED | OUTPATIENT
Start: 2019-11-14 | End: 2019-11-14

## 2019-11-14 RX ORDER — LIDOCAINE HYDROCHLORIDE 20 MG/ML
INJECTION, SOLUTION INFILTRATION; PERINEURAL PRN
Status: DISCONTINUED | OUTPATIENT
Start: 2019-11-14 | End: 2019-11-14

## 2019-11-14 RX ORDER — ALBUTEROL SULFATE 0.83 MG/ML
2.5 SOLUTION RESPIRATORY (INHALATION) EVERY 4 HOURS PRN
Status: DISCONTINUED | OUTPATIENT
Start: 2019-11-14 | End: 2019-11-14 | Stop reason: HOSPADM

## 2019-11-14 RX ORDER — BUPIVACAINE HYDROCHLORIDE AND EPINEPHRINE 2.5; 5 MG/ML; UG/ML
INJECTION, SOLUTION INFILTRATION; PERINEURAL PRN
Status: DISCONTINUED | OUTPATIENT
Start: 2019-11-14 | End: 2019-11-14 | Stop reason: HOSPADM

## 2019-11-14 RX ORDER — DEXAMETHASONE SODIUM PHOSPHATE 10 MG/ML
INJECTION, SOLUTION INTRAMUSCULAR; INTRAVENOUS PRN
Status: DISCONTINUED | OUTPATIENT
Start: 2019-11-14 | End: 2019-11-14

## 2019-11-14 RX ORDER — METOPROLOL TARTRATE 1 MG/ML
1-2 INJECTION, SOLUTION INTRAVENOUS EVERY 5 MIN PRN
Status: DISCONTINUED | OUTPATIENT
Start: 2019-11-14 | End: 2019-11-14 | Stop reason: HOSPADM

## 2019-11-14 RX ORDER — NALOXONE HYDROCHLORIDE 0.4 MG/ML
.1-.4 INJECTION, SOLUTION INTRAMUSCULAR; INTRAVENOUS; SUBCUTANEOUS
Status: DISCONTINUED | OUTPATIENT
Start: 2019-11-14 | End: 2019-11-14 | Stop reason: HOSPADM

## 2019-11-14 RX ORDER — KETOROLAC TROMETHAMINE 30 MG/ML
30 INJECTION, SOLUTION INTRAMUSCULAR; INTRAVENOUS EVERY 6 HOURS PRN
Status: DISCONTINUED | OUTPATIENT
Start: 2019-11-14 | End: 2019-11-14 | Stop reason: HOSPADM

## 2019-11-14 RX ORDER — MEPERIDINE HYDROCHLORIDE 25 MG/ML
12.5 INJECTION INTRAMUSCULAR; INTRAVENOUS; SUBCUTANEOUS
Status: DISCONTINUED | OUTPATIENT
Start: 2019-11-14 | End: 2019-11-14 | Stop reason: HOSPADM

## 2019-11-14 RX ORDER — MEPERIDINE HYDROCHLORIDE 25 MG/ML
25-50 INJECTION INTRAMUSCULAR; INTRAVENOUS; SUBCUTANEOUS
Status: DISCONTINUED | OUTPATIENT
Start: 2019-11-14 | End: 2019-11-14 | Stop reason: HOSPADM

## 2019-11-14 RX ORDER — HYDROMORPHONE HYDROCHLORIDE 1 MG/ML
.3-.5 INJECTION, SOLUTION INTRAMUSCULAR; INTRAVENOUS; SUBCUTANEOUS EVERY 10 MIN PRN
Status: DISCONTINUED | OUTPATIENT
Start: 2019-11-14 | End: 2019-11-14 | Stop reason: HOSPADM

## 2019-11-14 RX ORDER — BUPIVACAINE HYDROCHLORIDE AND EPINEPHRINE 2.5; 5 MG/ML; UG/ML
INJECTION, SOLUTION INFILTRATION; PERINEURAL PRN
Status: DISCONTINUED | OUTPATIENT
Start: 2019-11-14 | End: 2019-11-14

## 2019-11-14 RX ORDER — HYDROXYZINE HYDROCHLORIDE 25 MG/1
25 TABLET, FILM COATED ORAL
Status: DISCONTINUED | OUTPATIENT
Start: 2019-11-14 | End: 2019-11-14 | Stop reason: HOSPADM

## 2019-11-14 RX ORDER — DIMENHYDRINATE 50 MG/ML
INJECTION, SOLUTION INTRAMUSCULAR; INTRAVENOUS PRN
Status: DISCONTINUED | OUTPATIENT
Start: 2019-11-14 | End: 2019-11-14

## 2019-11-14 RX ADMIN — LIDOCAINE HYDROCHLORIDE 100 MG: 20 INJECTION, SOLUTION INFILTRATION; PERINEURAL at 07:38

## 2019-11-14 RX ADMIN — HYDROXYZINE HYDROCHLORIDE 50 MG: 50 INJECTION, SOLUTION INTRAMUSCULAR at 09:57

## 2019-11-14 RX ADMIN — GLYCOPYRROLATE 0.2 MG: 0.2 INJECTION, SOLUTION INTRAMUSCULAR; INTRAVENOUS at 08:04

## 2019-11-14 RX ADMIN — KETOROLAC TROMETHAMINE 30 MG: 30 INJECTION, SOLUTION INTRAMUSCULAR at 10:33

## 2019-11-14 RX ADMIN — FENTANYL CITRATE 50 MCG: 50 INJECTION INTRAMUSCULAR; INTRAVENOUS at 09:22

## 2019-11-14 RX ADMIN — SODIUM CHLORIDE, POTASSIUM CHLORIDE, SODIUM LACTATE AND CALCIUM CHLORIDE: 600; 310; 30; 20 INJECTION, SOLUTION INTRAVENOUS at 07:30

## 2019-11-14 RX ADMIN — ROCURONIUM BROMIDE 50 MG: 10 INJECTION INTRAVENOUS at 07:40

## 2019-11-14 RX ADMIN — DIMENHYDRINATE 25 MG: 50 INJECTION, SOLUTION INTRAMUSCULAR; INTRAVENOUS at 08:16

## 2019-11-14 RX ADMIN — HYDRALAZINE HYDROCHLORIDE 5 MG: 20 INJECTION INTRAMUSCULAR; INTRAVENOUS at 09:43

## 2019-11-14 RX ADMIN — FENTANYL CITRATE 50 MCG: 50 INJECTION, SOLUTION INTRAMUSCULAR; INTRAVENOUS at 07:30

## 2019-11-14 RX ADMIN — OXYCODONE HYDROCHLORIDE AND ACETAMINOPHEN 1 TABLET: 5; 325 TABLET ORAL at 10:18

## 2019-11-14 RX ADMIN — HYDROMORPHONE HYDROCHLORIDE 0.5 MG: 1 INJECTION, SOLUTION INTRAMUSCULAR; INTRAVENOUS; SUBCUTANEOUS at 09:40

## 2019-11-14 RX ADMIN — PROPOFOL 200 MG: 10 INJECTION, EMULSION INTRAVENOUS at 07:38

## 2019-11-14 RX ADMIN — HYDROMORPHONE HYDROCHLORIDE 0.5 MG: 1 INJECTION, SOLUTION INTRAMUSCULAR; INTRAVENOUS; SUBCUTANEOUS at 09:22

## 2019-11-14 RX ADMIN — METOPROLOL TARTRATE 2 MG: 1 INJECTION, SOLUTION INTRAVENOUS at 10:47

## 2019-11-14 RX ADMIN — FENTANYL CITRATE 50 MCG: 50 INJECTION INTRAMUSCULAR; INTRAVENOUS at 10:04

## 2019-11-14 RX ADMIN — MEPERIDINE HYDROCHLORIDE 50 MG: 25 INJECTION INTRAMUSCULAR; INTRAVENOUS; SUBCUTANEOUS at 09:57

## 2019-11-14 RX ADMIN — HYDRALAZINE HYDROCHLORIDE 5 MG: 20 INJECTION INTRAMUSCULAR; INTRAVENOUS at 10:42

## 2019-11-14 RX ADMIN — FENTANYL CITRATE 50 MCG: 50 INJECTION INTRAMUSCULAR; INTRAVENOUS at 09:43

## 2019-11-14 RX ADMIN — DEXAMETHASONE SODIUM PHOSPHATE 10 MG: 10 INJECTION, SOLUTION INTRAMUSCULAR; INTRAVENOUS at 09:02

## 2019-11-14 RX ADMIN — FENTANYL CITRATE 50 MCG: 50 INJECTION, SOLUTION INTRAMUSCULAR; INTRAVENOUS at 07:50

## 2019-11-14 RX ADMIN — MIDAZOLAM 2 MG: 1 INJECTION INTRAMUSCULAR; INTRAVENOUS at 07:30

## 2019-11-14 RX ADMIN — ONDANSETRON 4 MG: 2 INJECTION INTRAMUSCULAR; INTRAVENOUS at 08:37

## 2019-11-14 RX ADMIN — SUGAMMADEX 200 MG: 100 INJECTION, SOLUTION INTRAVENOUS at 09:03

## 2019-11-14 RX ADMIN — BUPIVACAINE HYDROCHLORIDE AND EPINEPHRINE BITARTRATE 60 ML: 2.5; .005 INJECTION, SOLUTION INFILTRATION; PERINEURAL at 09:02

## 2019-11-14 RX ADMIN — HYDROMORPHONE HYDROCHLORIDE 0.5 MG: 1 INJECTION, SOLUTION INTRAMUSCULAR; INTRAVENOUS; SUBCUTANEOUS at 08:22

## 2019-11-14 RX ADMIN — SODIUM CHLORIDE, POTASSIUM CHLORIDE, SODIUM LACTATE AND CALCIUM CHLORIDE: 600; 310; 30; 20 INJECTION, SOLUTION INTRAVENOUS at 07:04

## 2019-11-14 RX ADMIN — FENTANYL CITRATE 50 MCG: 50 INJECTION INTRAMUSCULAR; INTRAVENOUS at 09:25

## 2019-11-14 RX ADMIN — CEFAZOLIN SODIUM 2 G: 2 INJECTION, SOLUTION INTRAVENOUS at 07:46

## 2019-11-14 RX ADMIN — LIDOCAINE HYDROCHLORIDE 1 ML: 10 INJECTION, SOLUTION EPIDURAL; INFILTRATION; INTRACAUDAL; PERINEURAL at 07:04

## 2019-11-14 NOTE — ANESTHESIA POSTPROCEDURE EVALUATION
Patient: Victor M Mao    Procedure(s):  Laparoscopic Umbilical Herniorrhaphy with Mesh    Diagnosis:Umbilical hernia without obstruction and without gangrene [K42.9]  Diagnosis Additional Information: No value filed.    Anesthesia Type:  General, Peripheral Nerve Block    Note:  Anesthesia Post Evaluation    Patient location during evaluation: Phase 2 and Bedside  Patient participation: Able to fully participate in evaluation  Level of consciousness: awake and alert  Pain management: adequate  Airway patency: patent  Cardiovascular status: acceptable  Respiratory status: acceptable  Hydration status: acceptable  PONV: none     Anesthetic complications: None          Last vitals:  Vitals:    11/14/19 1130 11/14/19 1145 11/14/19 1200   BP: 138/85 134/85 134/83   Pulse: 115 99 98   Resp: 16 16 16   Temp:      SpO2: 93% 96% 93%         Electronically Signed By: DAHLIA Tello CRNA  November 14, 2019  5:19 PM

## 2019-11-14 NOTE — RESULT ENCOUNTER NOTE
Dear Victor M, your recent test results are attached.  The urine sample is unremarkable.  The blood cell count demonstrates no signs of infection or anemia.    Feel free to contact me via the office or My Chart if you have any questions regarding the above.  Sincerely,  DO SHAZIA MasOI

## 2019-11-14 NOTE — ANESTHESIA CARE TRANSFER NOTE
Patient: Victor M Mao    Procedure(s):  Laparoscopic Umbilical Herniorrhaphy with Mesh    Diagnosis: Umbilical hernia without obstruction and without gangrene [K42.9]  Diagnosis Additional Information: No value filed.    Anesthesia Type:   General, Peripheral Nerve Block     Note:  Airway :Face Mask  Patient transferred to:PACU  Handoff Report: Identifed the Patient, Identified the Reponsible Provider, Reviewed the pertinent medical history, Discussed the surgical course, Reviewed Intra-OP anesthesia mangement and issues during anesthesia, Set expectations for post-procedure period and Allowed opportunity for questions and acknowledgement of understanding      Vitals: (Last set prior to Anesthesia Care Transfer)    CRNA VITALS  11/14/2019 0840 - 11/14/2019 0930      11/14/2019             Resp Rate (observed):  (!) 4                Electronically Signed By: DAHLIA Rankin CRNA  November 14, 2019  9:30 AM

## 2019-11-14 NOTE — OP NOTE
Procedure Date: 11/14/2019      PROCEDURE:  Laparoscopic umbilical hernia repair with mesh.      PREOPERATIVE DIAGNOSIS:  Umbilical hernia.      POSTOPERATIVE DIAGNOSIS:  Umbilical hernia.      SURGEON:  Charbel Jordan DO.      ASSISTANT:  None.      ANESTHESIA:  General endotracheal anesthesia.      COMPLICATIONS:  None immediately apparent.      SPECIMENS:  None.      ESTIMATED BLOOD LOSS:  10 mL.      INDICATIONS FOR PROCEDURE:  Harshad is a 49-year-old male who I met in the surgical clinic with complaints of periumbilical pain with a diagnosis of umbilical hernia.  He states his pain is worse when he is coughing or sneezing or after a long day at work.  He does have some issues with constipation, but no obstructive symptoms.  He denies ever having a large bulge in the umbilicus that he has had to manually reduce.  He has lost 20 pounds this year with diet and exercise and he has no prior abdominal operations.  On physical exam, I confirmed a small subcentimeter umbilical hernia which we discussed laparoscopic versus open and primary repair versus mesh repair.  After discussion because of the patient's symptoms and diastasis of his upper abdomen, I recommended laparoscopic hernia repair with mesh.  I described the procedure in detail, restrictions and postop care.  After our informed discussion, agreed to proceed with scheduling the surgery.      DESCRIPTION OF PROCEDURE:  After the informed consent was obtained, the patient was brought from the preoperative holding area to the operating room, placed in the supine position.  Anesthesia was induced.  He was prepped and draped in normal sterile fashion.  Timeout was performed.  After the correct patient and correct procedure was verified, we began by making a left upper quadrant 5 mm incision.  Veress needle was inserted into the peritoneal cavity, and the abdomen was insufflated to 15 mmHg.  Trocar was then inserted and the camera was inserted.  General survey of  the abdomen revealed no gross abnormalities.  No incarceration.  Two additional 5 mm trocars were placed in the left mid and left lower quadrants.  I began by incising the peritoneum superior to the umbilicus.  The peritoneum and preperitoneal fat were removed for several centimeters surrounding the umbilicus.  A small umbilical hernia defect with a very small hernia sac was noted with a very small amount of preperitoneal fat in the hernia.  No other contents of the hernia.  I created a space surrounding the hernia and several centimeters in each direction.  I chose an 11 cm round Bard Echo Ventralight ST mesh using an 0 Vicryl suture and a PMI closure device.  I placed a single interrupted stitch to close the hernia defect in the midline.  I then placed the mesh into the peritoneal cavity.  This was brought up to the anterior abdominal wall using the suture directly through the hernia defect.  The mesh was secured in place with the OptiFix tacking device.  Once the outer ring of tacks was placed, I removed the inner scaffolding of the mesh.  This was intact after removal.  I placed an additional inner ring of tacks.  After all the tacks were placed, mesh was in satisfactory position.  Another survey of the abdomen revealed no apparent complications.  The abdomen was then desufflated while the ports were removed under direct visualization.  The skin was closed with inverted interrupted 4-0 Monocryl sutures.  Dermabond dressing was applied.  At the completion of the case, all instruments, needles and sponges were accounted for after a correct count.  The patient was then awoken from anesthesia and brought to the recovery room in stable condition.         DIOMEDES HERNANDEZ DO             D: 2019   T: 2019   MT: ALLISON      Name:     CAPRICE PATEL   MRN:      [id number]        Account:        [id number]   :      1970           Procedure Date: 2019      Document: [id number]

## 2019-11-14 NOTE — ANESTHESIA PREPROCEDURE EVALUATION
Anesthesia Pre-Procedure Evaluation    Patient: Victor M aMo   MRN: [id number] : 1970          Preoperative Diagnosis: Gastroesophageal reflux disease with esophagitis [K21.0]  Esophageal dysphagia [R13.10]    Procedure(s):  ESOPHAGOGASTRODUODENOSCOPY (EGD)    Past Medical History:   Diagnosis Date     Sleep apnea     has cpap but does not use     Past Surgical History:   Procedure Laterality Date     ESOPHAGOSCOPY, GASTROSCOPY, DUODENOSCOPY (EGD), COMBINED N/A 10/4/2019    Procedure: Esophagogastroduodenoscopy with Biopsy by Biopsy;  Surgeon: Charbel Jordan DO;  Location:  GI       Anesthesia Evaluation     . Pt has not had prior anesthetic            ROS/MED HX    ENT/Pulmonary:  - neg pulmonary ROS   (+)sleep apnea, , . .    Neurologic:  - neg neurologic ROS     Cardiovascular:     (+) hypertension----. : . . . :. . Previous cardiac testing Echodate:19results:Interpretation Summary  A treadmill exercise test according to the Reji protocol was performed.  The patient exercised 8:02.  The patient exhibited no chest pain during exercise.  The EKG portion of this stress test was negative for inducible ischemia (see  echo results below).  Normal resting wall motion and no stress-induced wall motion abnormality.  This was a normal stress echocardiogram with no evidence of stress-induced  ischemia.  There is no comparison study available.    Stress  The patient exercised 8:02.  Exercise was stopped due to dyspnea.  There was a borderline hypertensive BP response to exercise.  The patient exhibited no chest pain during exercise.  Target Heart Rate was achieved.  A treadmill exercise test according to the Reji protocol was performed.  No arrhythmia noted.  The EKG portion of this stress test was negative for inducible ischemia (see  echo results below).  Normal resting wall motion and no stress-induced wall motion abnormality.  Normal left ventricular function and wall motion at rest and  post-stress.  This was a normal stress echocardiogram with no evidence of stress-induced  ischemia.  _____________________________________________________________________________date: results:ECG reviewed date:5-28-19 results:Sinus Rhythm   Low voltage -possible pulmonary disease.     ABNORMAL date: results:          METS/Exercise Tolerance:  >4 METS   Hematologic:  - neg hematologic  ROS       Musculoskeletal:  - neg musculoskeletal ROS       GI/Hepatic: Comment: ABD pain    (+) GERD Asymptomatic on medication,       Renal/Genitourinary:     (+) Pt has no history of transplant,       Endo:     (+) Obesity, .      Psychiatric:     (+) psychiatric history anxiety      Infectious Disease:  - neg infectious disease ROS       Malignancy:      - no malignancy   Other:    (+) No chance of pregnancy C-spine cleared: N/A, no H/O Chronic Pain,                          Physical Exam  Normal systems: cardiovascular, pulmonary and dental    Airway   Mallampati: II  TM distance: >3 FB  Neck ROM: full    Dental     Cardiovascular   Rhythm and rate: regular and normal      Pulmonary    breath sounds clear to auscultation            Lab Results   Component Value Date    WBC 5.3 11/08/2019    HGB 14.9 11/08/2019    HCT 43.4 11/08/2019     11/08/2019    SED 12 12/14/2018     05/28/2019    POTASSIUM 4.1 05/28/2019    CHLORIDE 108 05/28/2019    CO2 25 05/28/2019    BUN 12 05/28/2019    CR 1.06 05/28/2019     (H) 05/28/2019    APOORVA 8.8 05/28/2019    ALBUMIN 3.8 09/06/2019    PROTTOTAL 8.1 09/06/2019    ALT 81 (H) 09/06/2019    AST 38 09/06/2019    ALKPHOS 52 09/06/2019    BILITOTAL 0.3 09/06/2019    LIPASE 209 09/06/2019    TSH 2.22 12/14/2018       Preop Vitals  BP Readings from Last 3 Encounters:   11/14/19 (!) 149/101   11/08/19 132/86   10/25/19 128/84    Pulse Readings from Last 3 Encounters:   11/08/19 100   10/18/19 84   09/06/19 74      Resp Readings from Last 3 Encounters:   11/14/19 18   11/08/19 18  "  10/18/19 16    SpO2 Readings from Last 3 Encounters:   11/14/19 97%   11/08/19 100%   10/18/19 100%      Temp Readings from Last 1 Encounters:   11/14/19 98  F (36.7  C) (Oral)    Ht Readings from Last 1 Encounters:   11/08/19 1.905 m (6' 3\")      Wt Readings from Last 1 Encounters:   11/08/19 118.4 kg (261 lb 1.6 oz)    Estimated body mass index is 32.64 kg/m  as calculated from the following:    Height as of 11/8/19: 1.905 m (6' 3\").    Weight as of 11/8/19: 118.4 kg (261 lb 1.6 oz).       Anesthesia Plan      History & Physical Review  History and physical reviewed and following examination; no interval change.    ASA Status:  2 .    NPO Status:  > 8 hours    Plan for General, ETT, Peripheral Nerve Block and For Post-op pain in coordination with surgeon with Intravenous and Propofol induction. Maintenance will be Balanced.    PONV prophylaxis:  Ondansetron (or other 5HT-3) and Meclizine or Dimenhydrinate  Will place a post-operative TAP block.        Postoperative Care  Postoperative pain management:  Oral pain medications, IV analgesics and Peripheral nerve block (Single Shot).      Consents  Anesthetic plan, risks, benefits and alternatives discussed with:  Patient.  Use of blood products discussed: No .   .                   DAHLIA Rankin CRNA  "

## 2019-11-14 NOTE — ANESTHESIA PROCEDURE NOTES
Peripheral nerve/Neuraxial procedure note : TAP  Pre-Procedure    Location: OR      Pre-Anesthestic Checklist: patient identified, IV checked, site marked, risks and benefits discussed, informed consent, monitors and equipment checked, pre-op evaluation, at physician/surgeon's request and post-op pain management    Timeout  Correct Patient: Yes   Correct Procedure: Yes   Correct Site: Yes   Correct Laterality: Yes   Correct Position: Yes   Site Marked: Yes   .   Procedure Documentation    .    Procedure: TAP, bilateral.     Ultrasound used to identify targeted nerve, plexus, or vascular marker and placed a needle adjacent to it., Ultrasound was used to visualize the spread of the anesthetic in close proximity to the above stated nerve. A permanent image is entered into the patient's record.  Patient Prep/Sterile Barriers; mask, sterile gloves, chlorhexidine gluconate and isopropyl alcohol.  Nerve Stim: Initial Level 0.05 mA. Lowest motor response mA..  Needle: insulated   Needle Gauge: 22.  Needle Length (millimeters) 100  Insertion Method: Single Shot.        Assessment/Narrative  Paresthesias: No.  Injection made incrementally with aspirations every 5 mL..  The placement was negative for: blood aspirated, painful injection and site bleeding.  Bolus given via..   Secured via.   Complications: none. Test dose of mL at. Test dose negative for signs of intravascular, subdural or intrathecal injection. Comments:  Pt tolerated the procedure well as under GA.  Placed by the SRNA under the supervision of the CRNA.  No complications noted.  Will follow if needed.

## 2019-11-14 NOTE — BRIEF OP NOTE
[facility] Brief Operative Note    Pre-operative diagnosis: Umbilical hernia without obstruction and without gangrene [K42.9]   Post-operative diagnosis umbilical hernia     Procedure: Procedure(s):  Laparoscopic Umbilical Herniorrhaphy with Mesh   Surgeon(s): Surgeon(s) and Role:     * Charbel Jordan, DO - Primary   Estimated blood loss: * No values recorded between 11/14/2019  7:56 AM and 11/14/2019  8:48 AM *    Specimens: * No specimens in log *   Findings: Small umbilical hernia with diastasis

## 2019-11-14 NOTE — DISCHARGE INSTRUCTIONS
[facility]    Home Care Following Hernia Repair (Open or Laparoscopic)    Dr. Jordan    Hernia Type:   Umbilical    Care of the Incision:    Surgical glue was used, keep your incision dry for 24 hours.  Then you may shower, but don t submerge under water for at least 2 weeks.  Gently pat your incision dry with a freshly laundered towel.    Do not touch your incision with bare hands or pick at scabs.    Leave your incision open to air.  Cover it only if clothing rubs or irritates it.  Activity:    Gradually increase your activity.  Walk short distances several times each day and increase the distance as your strength allows.    To promote circulation, do not cross your legs while sitting.    No strenuous lifting or straining for 2 weeks.   Do not lift anything over 20 pounds for 2 weeks.    Return to work will be determined by the type of work you do and should be discussed with your physician.    Do not drive or operate equipment while taking prescription pain medicines.  You may drive 1 week after surgery if you have stopped taking prescription pain medicines and are pain-free enough to react quickly and make an emergency stop if necessary.    Diet:    Return to the diet you were on before surgery.    Drink plenty of  water.    Avoid foods that cause constipation.      REMEMBER--most prescription pain pills cause constipation.  Walking, extra fluids, and increased fiber (fresh fruits and vegetables, etc.) are natural remedies for constipation.  You can also take mineral oil, 1-2 Tablespoons per day.  If still constipated you may try a stool softener such as Colace or Miralax.    Call Your Physician if You Have:    Redness, increased swelling or cloudy drainage from your incision.    A temperature of more than 101 degrees F.    Worsening pain in your incision not relieved by your prescription pain pills and/or a short rest.    Any questions or concerns about your recovery, please call          Business hours [phone]    After hours [phone] Nurse Advice Line (24 hours a day)    Follow-up Care:    Your follow up appointment is already scheduled.  See future appointments.  Pain management:  You have been prescribed Percocet to help manage your pain.  This medication does contain Tylenol/Acetaminophen.  You should not add in Tylenol/Acetaminophen if you are taking 2 Percocet within a 4 hour period.  You were given a Percocet in the recovery room about 10:15 AM.    When you have weaned off the Percocet, the dosage for Tylenol/Acetaminophen is 1-2 tabs (325 mg each) every 4 to 6 hours.    If you tolerate Ibuprofen, Motrin or Advil, take 600 mg every 6 hours.  Delay starting Ibuprofen until 4:30 PM today.  You received the IV form of Ibuprofen in the recovery room.    [facility] Same-Day Surgery   Adult Discharge Orders & Instructions     For 24 hours after surgery    1. Get plenty of rest.  A responsible adult must stay with you for at least 24 hours after you leave the hospital.   2. Do not drive or use heavy equipment.  If you have weakness or tingling, don't drive or use heavy equipment until this feeling goes away.  3. Do not drink alcohol.  4. Avoid strenuous or risky activities.  Ask for help when climbing stairs.   5. You may feel lightheaded.  If so, sit for a few minutes before standing.  Have someone help you get up.   6. You may have a slight fever. Call the doctor if your fever is over 100 F (37.7 C) (taken under the tongue) or lasts longer than 24 hours.  7. You may have a dry mouth, a sore throat, muscle aches or trouble sleeping.  These should go away after 24 hours.  8. Do not make important or legal decisions.  We don t expect you to have any problems from the surgery or treatment you had today. Just in case, here s what to do if you have pain, upset stomach (nausea), bleeding or infection:  Pain:  Take medicines your doctor has prescribed or over-the-counter  medicine they have suggested. Resting and using ice packs can help, too. For surgery on an arm or leg, raise it on a pillow to ease swelling. Call your doctor if these methods don t work.  Copyright Mahendra Neri, Licensed under [employer]  Upset stomach (nausea):  Take anti-nausea medicine approved by your doctor. Drink clear liquids like apple juice, ginger ale, broth or 7-Up. Be sure to drink enough fluids. Rest can help, too. Move to normal foods when you re ready.   Bleeding:  In the first 24 hours, you may see a little blood on your dressing, about the size of a quarter. You don t need to worry about this much blood, but if the blood spot keeps getting bigger:    Put pressure on the wound if you can, AND    Call your doctor.  Copyright [employer], Licensed under [employer]  Fever/Infection: Please call your doctor if you have any of these signs:    Redness    Swelling    Wound feels warm    Pain gets worse    Bad-smelling fluid leaks from wound    Fever or chills  Call your doctor for any of the followin.  It has been over 8 to 10 hours since surgery and you are still not able to urinate (pass water).    2.  Headache for over 24 hours.    Nurse advice line: [phone]

## 2019-11-15 ENCOUNTER — MYC MEDICAL ADVICE (OUTPATIENT)
Dept: SURGERY | Facility: CLINIC | Age: 49
End: 2019-11-15

## 2019-11-15 ENCOUNTER — TELEPHONE (OUTPATIENT)
Dept: SURGERY | Facility: CLINIC | Age: 49
End: 2019-11-15

## 2019-11-15 NOTE — OR NURSING
"[facility] Same Day Surgery  Discharge Call Back  Victor M Mao  1970  MRN: [id number]  Home: [phone] (home)   PCP: Lee Chowdhury    We are calling to see how you're doing since your surgery/procedure with us?   Comments: N/A  Clinical Questions  1. Have you had time to look at your discharge instructions? Do you have any questions in regards to the instructions?   Comment: Yes/No  2. Do you feel your pain is being controlled with the regimen the surgeon sent you home on? (ie: prescription medications, over the counter pain medications, ice packs)   Comments: Yes  3. Have you noticed any drainage on your dressing? Do you know what to do if you have bleeding as a result of your procedure?   Comments: No/Yes  4. Have you had any nausea/vomiting? Do you know how to treat this?   Comment: No/Yes  5. Have you had any signs/symptoms of infection? (ie: fever, swelling, heat, drainage or redness) Do you know what to do if you have?   Comment: No/Yes  6. Do you have a follow up appointment made with your surgeon? Do you have a number to contact them at if you need it?   Comment: Yes/Yes  Retained Foreign Object:  N/A    \"You did a great job.\"    You may be randomly selected to fill out a [facility] survey. We would appreciate you taking the time to fill this out. It is important to us if you would answer all of the questions on the survey.              "

## 2019-11-15 NOTE — TELEPHONE ENCOUNTER
JOSE GUADALUPE on file. FMLA form complete. Faxed to [phone], copy to scanning, copy placed in RN faxed items drawer. Copy emailed to patient @ [email or web address].    Anurag Flowers MA  [facility]  11/15/2019 9:14 AM

## 2019-11-15 NOTE — TELEPHONE ENCOUNTER
S:Difficulity voiding.  B: Today had umbilical hernia repair around 0800.   Harshad stated he did not void in the PACU prior to going home.    Home voiding history  1400  1/4 cup  1600 1/4 cup  1800  1/3 cup  1845  3 cups    No visible blood in urine.  Dark yellow in color. Was having some bladder discomfort.  A: Per care guideline to go to ED to see a provider now.While nurse was charting  Patient has the urge to void.  When came back onto phone had voided 3 cups    R: Patient would like to wait and see if his nest wood is even better.  If he can't void the next time he feels the urge he will then go to the ED.  Carley Mullins RN, [facility]      Reason for Disposition    [1] Unable to urinate (or only a few drops) > 4 hours AND     [2] bladder feels very full (e.g., palpable bladder or strong urge to urinate)    Additional Information    Negative: Shock suspected (e.g., cold/pale/clammy skin, too weak to stand, low BP, rapid pulse)    Negative: Sounds like a life-threatening emergency to the triager    Protocols used: URINARY SYMPTOMS-A-AH

## 2019-11-20 NOTE — TELEPHONE ENCOUNTER
Writer faxed forms to number patient instructed below, then called patient to inform him of these actions. Patient will call back if needed.  Michelle Milner RN on 11/20/2019 at 11:43 AM

## 2019-11-20 NOTE — TELEPHONE ENCOUNTER
Writer found a different set of forms for this patient. Writer taking these to [place] to work on tomorrow.  Michelle Milner RN on 11/20/2019 at 4:45 PM

## 2019-11-22 NOTE — TELEPHONE ENCOUNTER
JOSE GUADALUPE on file. [employer] form complete. Faxed to [phone], copy to scanning, copy placed in RN faxed items drawer.    Anurag Flowers MA  [facility]  11/22/2019 10:45 AM

## 2019-11-27 ENCOUNTER — OFFICE VISIT (OUTPATIENT)
Dept: SURGERY | Facility: CLINIC | Age: 49
End: 2019-11-27
Payer: COMMERCIAL

## 2019-11-27 VITALS
SYSTOLIC BLOOD PRESSURE: 130 MMHG | WEIGHT: 262 LBS | DIASTOLIC BLOOD PRESSURE: 80 MMHG | HEIGHT: 75 IN | BODY MASS INDEX: 32.58 KG/M2 | TEMPERATURE: 96.6 F

## 2019-11-27 DIAGNOSIS — Z98.890 S/P LAPAROSCOPIC HERNIA REPAIR: Primary | ICD-10-CM

## 2019-11-27 DIAGNOSIS — Z87.19 S/P LAPAROSCOPIC HERNIA REPAIR: Primary | ICD-10-CM

## 2019-11-27 PROCEDURE — 99024 POSTOP FOLLOW-UP VISIT: CPT | Performed by: SURGERY

## 2019-11-27 ASSESSMENT — MIFFLIN-ST. JEOR: SCORE: 2144.05

## 2019-11-27 NOTE — PROGRESS NOTES
General Surgery Follow Up    Pt returns for follow up visit s/p lap UHR with mesh    HPI:  Harshad is doing pretty well. He had some pain in the first week but is not feeling much better. He thinks he is ready to go back to work next week as planned. He has no other concerns.      Past Medical History:   Diagnosis Date     Sleep apnea     has cpap but does not use       Past Surgical History:   Procedure Laterality Date     ESOPHAGOSCOPY, GASTROSCOPY, DUODENOSCOPY (EGD), COMBINED N/A 10/4/2019    Procedure: Esophagogastroduodenoscopy with Biopsy by Biopsy;  Surgeon: Charbel Jordan DO;  Location:  GI     LAPAROSCOPIC HERNIORRHAPHY UMBILICAL N/A 11/14/2019    Procedure: Laparoscopic Umbilical Herniorrhaphy with Mesh;  Surgeon: Charbel Jordan DO;  Location:  OR       Social History     Socioeconomic History     Marital status: Single     Spouse name: Not on file     Number of children: Not on file     Years of education: Not on file     Highest education level: Not on file   Occupational History     Not on file   Social Needs     Financial resource strain: Not on file     Food insecurity:     Worry: Not on file     Inability: Not on file     Transportation needs:     Medical: Not on file     Non-medical: Not on file   Tobacco Use     Smoking status: Never Smoker     Smokeless tobacco: Never Used   Substance and Sexual Activity     Alcohol use: Yes     Comment: occasional 2 times week     Drug use: No     Sexual activity: Yes     Partners: Female   Lifestyle     Physical activity:     Days per week: Not on file     Minutes per session: Not on file     Stress: Not on file   Relationships     Social connections:     Talks on phone: Not on file     Gets together: Not on file     Attends Tenriism service: Not on file     Active member of club or organization: Not on file     Attends meetings of clubs or organizations: Not on file     Relationship status: Not on file     Intimate partner violence:      "Fear of current or ex partner: Not on file     Emotionally abused: Not on file     Physically abused: Not on file     Forced sexual activity: Not on file   Other Topics Concern     Parent/sibling w/ CABG, MI or angioplasty before 65F 55M? Not Asked   Social History Narrative     Not on file       Current Outpatient Medications   Medication Sig Dispense Refill     ciprofloxacin (CIPRO) 500 MG tablet Take 1 tablet (500 mg) by mouth 2 times daily 28 tablet 0     citalopram (CELEXA) 40 MG tablet Take 1 tablet (40 mg) by mouth daily 90 tablet 3     finasteride (PROSCAR) 5 MG tablet Take 1 pill daily 90 tablet 3     lisinopril-hydrochlorothiazide (PRINZIDE/ZESTORETIC) 20-12.5 MG tablet Take 1 tablet by mouth daily 90 tablet 3     LORazepam (ATIVAN) 0.5 MG tablet Take 2 tablets (1 mg) by mouth every 8 hours as needed for anxiety 60 tablet 0     omeprazole (PRILOSEC) 40 MG DR capsule Take 1 capsule (40 mg) by mouth daily 90 capsule 3     oxyCODONE-acetaminophen (PERCOCET) 5-325 MG tablet Take 1-2 tablets by mouth every 4 hours as needed for moderate to severe pain 16 tablet 0     sildenafil (VIAGRA) 100 MG tablet Take 1 tablet (100 mg) by mouth daily as needed (Erectile dysfunction) 10 tablet 3       Medications and history reviewed    Physical exam:  Vitals: /80   Temp 96.6  F (35.9  C) (Temporal)   Ht 1.905 m (6' 3\")   Wt 118.8 kg (262 lb)   BMI 32.75 kg/m    BMI= Body mass index is 32.75 kg/m .    HEART: RRR, no new murmurs  LUNGS: CTAB, equal chest rise, good effort  ABD: soft, non tender, non distended  INCISIONS: c/d/i, minimal bruising  EXT: ARNDT, no deformities    PATHOLOGY:  none    Assessment:     ICD-10-CM    1. S/P laparoscopic hernia repair Z98.890     Z87.19      Plan: Surgical pictures reviewed. Restrictions reviewed and questions answered. He may return prn.    Charbel Jordan, DO    "

## 2019-11-27 NOTE — LETTER
11/27/2019         RE: Victor M Mao  [address]  [place] [address]        Dear Colleague,    Thank you for referring your patient, Victor M Mao, to the [facility]. Please see a copy of my visit note below.    General Surgery Follow Up    Pt returns for follow up visit s/p lap UHR with mesh    HPI:  Harshad is doing pretty well. He had some pain in the first week but is not feeling much better. He thinks he is ready to go back to work next week as planned. He has no other concerns.      Past Medical History:   Diagnosis Date     Sleep apnea     has cpap but does not use       Past Surgical History:   Procedure Laterality Date     ESOPHAGOSCOPY, GASTROSCOPY, DUODENOSCOPY (EGD), COMBINED N/A 10/4/2019    Procedure: Esophagogastroduodenoscopy with Biopsy by Biopsy;  Surgeon: Charbel Jordan DO;  Location:  GI     LAPAROSCOPIC HERNIORRHAPHY UMBILICAL N/A 11/14/2019    Procedure: Laparoscopic Umbilical Herniorrhaphy with Mesh;  Surgeon: Charbel Jordan DO;  Location:  OR       Social History     Socioeconomic History     Marital status: Single     Spouse name: Not on file     Number of children: Not on file     Years of education: Not on file     Highest education level: Not on file   Occupational History     Not on file   Social Needs     Financial resource strain: Not on file     Food insecurity:     Worry: Not on file     Inability: Not on file     Transportation needs:     Medical: Not on file     Non-medical: Not on file   Tobacco Use     Smoking status: Never Smoker     Smokeless tobacco: Never Used   Substance and Sexual Activity     Alcohol use: Yes     Comment: occasional 2 times week     Drug use: No     Sexual activity: Yes     Partners: Female   Lifestyle     Physical activity:     Days per week: Not on file     Minutes per session: Not on file     Stress: Not on file   Relationships     Social connections:     Talks on phone: Not on file     Gets together:  "Not on file     Attends Sabianist service: Not on file     Active member of club or organization: Not on file     Attends meetings of clubs or organizations: Not on file     Relationship status: Not on file     Intimate partner violence:     Fear of current or ex partner: Not on file     Emotionally abused: Not on file     Physically abused: Not on file     Forced sexual activity: Not on file   Other Topics Concern     Parent/sibling w/ CABG, MI or angioplasty before 65F 55M? Not Asked   Social History Narrative     Not on file       Current Outpatient Medications   Medication Sig Dispense Refill     ciprofloxacin (CIPRO) 500 MG tablet Take 1 tablet (500 mg) by mouth 2 times daily 28 tablet 0     citalopram (CELEXA) 40 MG tablet Take 1 tablet (40 mg) by mouth daily 90 tablet 3     finasteride (PROSCAR) 5 MG tablet Take 1 pill daily 90 tablet 3     lisinopril-hydrochlorothiazide (PRINZIDE/ZESTORETIC) 20-12.5 MG tablet Take 1 tablet by mouth daily 90 tablet 3     LORazepam (ATIVAN) 0.5 MG tablet Take 2 tablets (1 mg) by mouth every 8 hours as needed for anxiety 60 tablet 0     omeprazole (PRILOSEC) 40 MG DR capsule Take 1 capsule (40 mg) by mouth daily 90 capsule 3     oxyCODONE-acetaminophen (PERCOCET) 5-325 MG tablet Take 1-2 tablets by mouth every 4 hours as needed for moderate to severe pain 16 tablet 0     sildenafil (VIAGRA) 100 MG tablet Take 1 tablet (100 mg) by mouth daily as needed (Erectile dysfunction) 10 tablet 3       Medications and history reviewed    Physical exam:  Vitals: /80   Temp 96.6  F (35.9  C) (Temporal)   Ht 1.905 m (6' 3\")   Wt 118.8 kg (262 lb)   BMI 32.75 kg/m     BMI= Body mass index is 32.75 kg/m .    HEART: RRR, no new murmurs  LUNGS: CTAB, equal chest rise, good effort  ABD: soft, non tender, non distended  INCISIONS: c/d/i, minimal bruising  EXT: ARNDT, no deformities    PATHOLOGY:  none    Assessment:     ICD-10-CM    1. S/P laparoscopic hernia repair Z98.890     Z87.19  "     Plan: Surgical pictures reviewed. Restrictions reviewed and questions answered. He may return prn.    Charbel Jordan, DO      Again, thank you for allowing me to participate in the care of your patient.        Sincerely,        Charbel Jordan, DO

## 2020-03-11 ENCOUNTER — HEALTH MAINTENANCE LETTER (OUTPATIENT)
Age: 50
End: 2020-03-11

## 2020-04-21 ENCOUNTER — TELEPHONE (OUTPATIENT)
Dept: FAMILY MEDICINE | Facility: OTHER | Age: 50
End: 2020-04-21

## 2020-04-21 ENCOUNTER — VIRTUAL VISIT (OUTPATIENT)
Dept: FAMILY MEDICINE | Facility: CLINIC | Age: 50
End: 2020-04-21
Payer: COMMERCIAL

## 2020-04-21 DIAGNOSIS — I10 BENIGN ESSENTIAL HYPERTENSION: ICD-10-CM

## 2020-04-21 DIAGNOSIS — F41.9 ANXIETY: ICD-10-CM

## 2020-04-21 DIAGNOSIS — N41.0 PROSTATITIS, ACUTE: Primary | ICD-10-CM

## 2020-04-21 PROCEDURE — 99214 OFFICE O/P EST MOD 30 MIN: CPT | Mod: TEL | Performed by: INTERNAL MEDICINE

## 2020-04-21 RX ORDER — CIPROFLOXACIN 500 MG/1
500 TABLET, FILM COATED ORAL 2 TIMES DAILY
Qty: 28 TABLET | Refills: 0 | Status: SHIPPED | OUTPATIENT
Start: 2020-04-21 | End: 2020-05-22

## 2020-04-21 RX ORDER — FLUTICASONE PROPIONATE 50 MCG
1 SPRAY, SUSPENSION (ML) NASAL DAILY
COMMUNITY
End: 2020-05-22

## 2020-04-21 NOTE — PROGRESS NOTES
"Victor M Mao is a 49 year old male who is being evaluated via a billable telephone visit.      The patient has been notified of following:     \"This telephone visit will be conducted via a call between you and your physician/provider. We have found that certain health care needs can be provided without the need for a physical exam.  This service lets us provide the care you need with a short phone conversation.  If a prescription is necessary we can send it directly to your pharmacy.  If lab work is needed we can place an order for that and you can then stop by our lab to have the test done at a later time.    Telephone visits are billed at different rates depending on your insurance coverage. During this emergency period, for some insurers they may be billed the same as an in-person visit.  Please reach out to your insurance provider with any questions.    If during the course of the call the physician/provider feels a telephone visit is not appropriate, you will not be charged for this service.\"    Patient has given verbal consent for Telephone visit?  Yes    How would you like to obtain your AVS? Chrishart    Jordan     Victor M Mao is a 49 year old male who presents to clinic today for the following health issues:    HPI  Urinary () - Male  Duration of complaint: 1.5 weeks   Description:   Dysuria (painful urination): YES  Hematuria (blood in urine): no  Frequency: YES  Are you urinating at night : YES  Hesitancy (delay in urine): YES  Retention (unable to empty): YES  Decrease in urinary flow: YES  Incontinence: no  Progression of Symptoms:  worsening  Accompanying Signs & Symptoms:  Fever: no  Back/Flank pain: possible  History:   History of frequent UTI's: yes  History of kidney stones: no  History of hernias: YES- repair in November   Personal or Family history of Prostate problems: YES  Sexually active: YES  Alleviating factors:  Urethral discharge: no  Testicle lumps/masses/pain: no  Nausea and/or " vomiting: no  Abdominal pain: no  Therapies Tried and outcome: Stopped your  finasteride                       Chief Complaint         The patient is a pleasant 49-year-old gentleman whose had some problems in the past with benign prostatic hyperplasia.  He currently presents virtually with complaints of prostate/pelvic discomfort, hesitancy, retention, and decreased urine caliber.  He also notes that he has discomfort with ejaculation and possibly scant amount of blood in the ejaculate.  In response to his decreased urine flow, he discontinued the finasteride because he knew that this medicine has something to do with urine flow.  Not terribly surprisingly, it worsened.  He denies fevers, chills or back pain.  He has no symptoms of systemic infection at this time.  He has a concurrent history of some hypertension which is controlled and he notes that he is had no chest pain, lightheadedness or dizziness.  He also has some anxiety and depression which has been worsened slightly with the current global pandemic/apocalypse situation but notes that he will continue to take his citalopram and lorazepam as needed.  He is not having any suicidal ideation and is using the medication at a prescribed rate and not aggressively.                           PAST, FAMILY,SOCIAL HISTORY:     Medical  History:   has a past medical history of Hypertension (2013) and Sleep apnea.     Surgical History:   has a past surgical history that includes Esophagoscopy, gastroscopy, duodenoscopy (EGD), combined (N/A, 10/4/2019) and Laparoscopic herniorrhaphy umbilical (N/A, 11/14/2019).     Social History:   reports that he has never smoked. He has never used smokeless tobacco. He reports current alcohol use. He reports that he does not use drugs.     Family History:  family history is not on file.            MEDICATIONS  Current Outpatient Medications   Medication Sig Dispense Refill     ciprofloxacin (CIPRO) 500 MG tablet Take 1 tablet (500  mg) by mouth 2 times daily 28 tablet 0     citalopram (CELEXA) 40 MG tablet Take 1 tablet (40 mg) by mouth daily 90 tablet 3     fluticasone (FLONASE) 50 MCG/ACT nasal spray Spray 1 spray into both nostrils daily       lisinopril-hydrochlorothiazide (PRINZIDE/ZESTORETIC) 20-12.5 MG tablet Take 1 tablet by mouth daily 90 tablet 3     LORazepam (ATIVAN) 0.5 MG tablet Take 2 tablets (1 mg) by mouth every 8 hours as needed for anxiety 60 tablet 0     omeprazole (PRILOSEC) 40 MG DR capsule Take 1 capsule (40 mg) by mouth daily 90 capsule 3     sildenafil (VIAGRA) 100 MG tablet Take 1 tablet (100 mg) by mouth daily as needed (Erectile dysfunction) 10 tablet 3     finasteride (PROSCAR) 5 MG tablet Take 1 pill daily (Patient not taking: Reported on 4/21/2020) 90 tablet 3         --------------------------------------------------------------------------------------------------------------------                              Review of Systems       LUNGS: Pt denies: cough, excess sputum, hemoptysis, or shortness of breath.   HEART: Pt denies: chest pain, arrhythmia, syncope, tachy or bradyarrhythmia.   GI: Pt denies: nausea, vomiting, diarrhea, constipation, melena, or hematochezia.   NEURO: Pt denies: seizures, strokes, diplopia, weakness, paraesthesias, or paralysis.   SKIN: Pt denies: itching, rashes, discoloration, or specific lesions of concern. Denies recent hair loss.   PSYCH: The patient denies significant depression, anxiety, mood imbalance. Specifically denies any suicidal ideation.                                    No physical examination is performed as this is a virtual visit.  The patient's voice is strong, there is no evidence of labored breathing or wheezing.  The patient is alert and appropriate and demonstrates no obvious behavioral irregularities.             Decision Making       1. Prostatitis, acute  Recommend restarting the finasteride to avoid obstruction if possible  Start the antibiotics  Aggressive  "hydration  Routine \"cleaning of the pipes\"  - ciprofloxacin (CIPRO) 500 MG tablet; Take 1 tablet (500 mg) by mouth 2 times daily  Dispense: 28 tablet; Refill: 0    2. Anxiety  Continue current medication    3. Benign essential hypertension  Continue current medication                                 FOLLOW UP   I have asked the patient to make an appointment for followup with me virtually in 2 weeks if he does not have substantial improvement.            I have carefully explained the diagnosis and treatment options to the patient.  The patient has displayed an understanding of the above, and all subsequent questions were answered.      DO JUAN Mas    Portions of this note were produced using [email or web address]  Although every attempt at real-time proof reading has been made, occasional grammar/syntax errors may have been missed.      Telephone time: 12 minutes  "

## 2020-04-21 NOTE — TELEPHONE ENCOUNTER
Left message for patient to call and schedule, offered video tomorrow or a phone visit.     Anel Pereyra XRO/

## 2020-04-21 NOTE — TELEPHONE ENCOUNTER
Can this patient be triaged, this message came through [employer].      Message     Appointment Request From: Victor M Mao       With Provider: Lee Chowdhury DO [[facility]]       Preferred Date Range: 4/27/2020 - 4/28/2020       Preferred Times: Any Time       Reason for visit: Request an Appointment       Comments:    I believe I have Prostatitis.  I have more urgency, discomfort in prostate and discomfort while ejaculating.  My urine isn't cloudy but is darker than normal. I generally see Dr. Chowdhury

## 2020-04-30 ENCOUNTER — NURSE TRIAGE (OUTPATIENT)
Dept: NURSING | Facility: CLINIC | Age: 50
End: 2020-04-30

## 2020-04-30 NOTE — TELEPHONE ENCOUNTER
"  Caller is [employer] employee  - [facility]     CC:  Cough     No fever   No SOB   \"Pinch\" sensation on R lung - down from his arm pit area       Possible (+) Co-worker  (awaiting results)        Not contacted [facility] yet       Sounds clear on the phone - full sentences - clear voice - occasional cough heard         A/P:   > Directed to contact [facility] for further direction on testing and home care - do not wait to call - call today          > I did provide tele# for them         COVID 19 Nurse Triage Plan/Patient Instructions    Please be aware that novel coronavirus (COVID-19) may be circulating in the community. If you develop symptoms such as fever, cough, or SOB or if you have concerns about the presence of another infection including coronavirus (COVID-19), please contact your health care provider or visit [email or web address].     Disposition/Instructions    Patient to stay at home and follow home care protocol based instructions.     Thank you for limiting contact with others, wearing a simple mask to cover your cough, practice good hand hygiene habits and accessing our virtual services where possible to limit the spread of this virus.    For more information about COVID19 and options for caring for yourself at home, please visit the CDC website at https://www.cdc.gov/coronavirus/2019-ncov/about/steps-when-sick.html  For more options for care at [facility], please visit our website at [email or web address]    For more information, please use the [place] Department of Health COVID-19 Website: [email or web address]/[email or web address]/index.html  [place] Department of Health ([facility]) COVID-19 Hotlines (Interpreters available):      Health questions: Phone Number: [phone] or [phone] and Hours: 7 a.m. to 7 p.m.    Schools and  questions: Phone Number: [phone] or [phone] and Hours 7 a.m. to 7 p.m.        Reason for " Disposition    COVID-19 Testing, questions about    Protocols used: CORONAVIRUS (COVID-19) DIAGNOSED OR [id number] 3.30.20

## 2020-05-03 ENCOUNTER — RESULTS ONLY (OUTPATIENT)
Dept: LAB | Age: 50
End: 2020-05-03

## 2020-05-03 ENCOUNTER — OFFICE VISIT (OUTPATIENT)
Dept: URGENT CARE | Facility: URGENT CARE | Age: 50
End: 2020-05-03
Payer: COMMERCIAL

## 2020-05-03 DIAGNOSIS — Z53.9 ERRONEOUS ENCOUNTER--DISREGARD: Primary | ICD-10-CM

## 2020-05-04 LAB
SARS-COV-2 RNA SPEC QL NAA+PROBE: NOT DETECTED
SPECIMEN SOURCE: NORMAL

## 2020-05-05 ENCOUNTER — VIRTUAL VISIT (OUTPATIENT)
Dept: FAMILY MEDICINE | Facility: OTHER | Age: 50
End: 2020-05-05

## 2020-05-05 NOTE — PROGRESS NOTES
"Date: 2020 14:32:27  Clinician: Michelle Wall  Clinician NPI: [id number]  Patient: Victor M Mao  Patient : 1970  Patient Address: [address] [place] [address] [place] [address]  Patient Phone: [phone]  Visit Protocol: URI  Patient Summary:  Victor M is a 49 year old ( : 1970 ) male who initiated a Visit for cold, sinus infection, or influenza. When asked the question \"Please sign me up to receive news, health information and promotions from [employer].\", Victor M responded \"Yes\".    Victor M states his symptoms started gradually 3-4 days ago. After his symptoms started, they improved and then got worse again.   His symptoms consist of myalgia, rhinitis, facial pain or pressure, a sore throat, a cough, nasal congestion, nausea, diarrhea, ear pain, a headache, malaise, enlarged lymph nodes, and chills. He is experiencing mild difficulty breathing with activities but can speak normally in full sentences.   Symptom details     Nasal secretions: The color of his mucus is clear and white.    Cough: Victor M coughs a few times an hour and his cough is more bothersome at night. Phlegm comes into his throat when he coughs. He does not believe his cough is caused by post-nasal drip. The color of the phlegm is clear and white.     Sore throat: Victor M reports having moderate throat pain (4-6 on a 10 point pain scale), does not have exudate on his tonsils, and can swallow liquids. The lymph nodes in his neck are enlarged. A rash has not appeared on the skin since the sore throat started.     Facial pain or pressure: The facial pain or pressure feels worse when bending over or leaning forward.     Headache: He states the headache is moderate (4-6 on a 10 point pain scale).      Victor M denies having fever, vomiting, teeth pain, ageusia, anosmia, and wheezing. He also denies having a sinus infection within the past year and having recent facial or sinus surgery in the past 60 days.   Precipitating events  Within the " past week, Victor M has not been exposed to someone with strep throat. He has not recently been exposed to someone with influenza. Victor M has not been in close contact with any high risk individuals.   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 He does not provide direct patient care. Additional job details as reported by the patient (free text): I am a  for [employer].  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   Victor M has not had a close contact with a laboratory-confirmed COVID-19 patient within 14 days of symptom onset. He also has not had a close contact with a suspected COVID-19 patient within 14 days of symptom onset.   Pertinent medical history  Victor M has taken an antibiotic medication in the past month. Antibiotic details as reported by the patient (free text): ciprofloxacin 500mg for prostate infection. I have 1 dose left   Victor M needs a return to work/school note.   Weight: 260 lbs   Victor M does not smoke or use smokeless tobacco.   Additional information as reported by the patient (free text): I was tested for Covid 19 on 5/3 and received back a negative result today 5/5   Weight: 260 lbs    MEDICATIONS: Flonase Allergy Relief nasal, finasteride oral, citalopram oral, omeprazole oral, ciprofloxacin oral, ALLERGIES: Sulfa (Sulfonamide Antibiotics)  Clinician Response:  Dear Victor M,   Your symptoms show that you may have coronavirus (COVID-19).&nbsp; Although your recent test for covid-19 was negative, this does not fully rule out covid-19.&nbsp; As you have some likely exposures and your symptoms are consistent with covid-19, we advised getting a second test (instructions below) as doing a second test will increase the accuracy  of your results.&nbsp; This illness can cause fever, cough and trouble breathing. Many people get a mild case and get better on their own. Some people can get very sick.&nbsp; If you continue to have symptoms for more than 10 days, contact your primary doctor to see if an antibody test would be recommended for you.  Will I be tested for COVID-19?  We would recommend you be tested for coronavirus. Here is how to get that scheduled:   Call [phone]. Tell them you were referred by [facility] to have a COVID-19 test. You will be scheduled at one of our [facility] testing locations (drive-up). Please have your [facility] visit information ready when you call including your visit ID number to verify you were referred.    How can I protect others in the meantime?  First, stay home and away from others (self-isolate) until:   You've had no fever---and no medicine that reduces fever---for 3 full days (72 hours). And...    Your other symptoms have gotten better. For example, your cough or breathing has improved. And...    At least 7 days have passed since your symptoms started.   During this time:   Don't go to work, school or anywhere else.     Stay away from others in your home. No hugging, kissing or shaking hands.    Don't let anyone visit.    Cover your mouth and nose with a mask, tissue or wash cloth to avoid spreading germs.    Wash your hands and face often. Use soap and water.   How can I take care of myself?  1.Take Tylenol (acetaminophen) for fever or pain. If you have liver or kidney problems, ask your family doctor if it's okay to take Tylenol.   Adults can take either:    650 mg (two 325 mg pills) every 4 to 6 hours, or...    1,000 mg (two 500 mg pills) every 8 hours as needed.     Note: Don't take more than 3,000 mg in one day.   For children, check the Tylenol bottle for the right dose. The dose is based on the child's age or weight.  2.If you have other health problems (like cancer, heart failure, an organ  transplant or severe kidney disease): Call your specialty clinic if you don't feel better in the next 2 days.  3.Know when to call 911: If your breathing is so bad that it keeps you from doing normal activities, call 911 or go to the emergency room. Tell them that you've been staying home and may have COVID-19.  4.Sign up for [employer]. We know it's scary to hear that you might have COVID-19. We want to track your symptoms to make sure you're okay over the next 2 weeks. Please look for an email from [employer]---this is a free, online program that we'll use to keep in touch. To sign up, follow the link in the email. Learn more at [email or web address].  Where can I get more information?  To learn more about COVID-19 and how to care for yourself at home, please visit the CDC website at https://www.cdc.gov/coronavirus/2019-ncov/about/steps-when-sick.html.  For more about your care at [facility], please visit [email or web address]     Diagnosis: Cough  Diagnosis ICD: R05

## 2020-05-07 NOTE — PROGRESS NOTES
Pt arrived to testing site and informed us he is an employee- badge provided as proof. Will process this patient as an employee [facility] test. Karen Kennedy, PCS

## 2020-05-08 ENCOUNTER — OFFICE VISIT (OUTPATIENT)
Dept: URGENT CARE | Facility: URGENT CARE | Age: 50
End: 2020-05-08
Payer: COMMERCIAL

## 2020-05-08 ENCOUNTER — RESULTS ONLY (OUTPATIENT)
Dept: LAB | Age: 50
End: 2020-05-08

## 2020-05-08 DIAGNOSIS — Z20.822 SUSPECTED 2019 NOVEL CORONAVIRUS INFECTION: Primary | ICD-10-CM

## 2020-05-08 LAB
SARS-COV-2 RNA SPEC QL NAA+PROBE: NOT DETECTED
SPECIMEN SOURCE: NORMAL

## 2020-05-08 PROCEDURE — 99207 ZZC NO BILLABLE SERVICE THIS VISIT: CPT

## 2020-05-10 ENCOUNTER — HOSPITAL ENCOUNTER (EMERGENCY)
Facility: CLINIC | Age: 50
Discharge: HOME | End: 2020-05-10
Attending: NURSE PRACTITIONER | Admitting: NURSE PRACTITIONER
Payer: COMMERCIAL

## 2020-05-10 ENCOUNTER — APPOINTMENT (OUTPATIENT)
Dept: GENERAL RADIOLOGY | Facility: CLINIC | Age: 50
End: 2020-05-10
Attending: NURSE PRACTITIONER
Payer: COMMERCIAL

## 2020-05-10 ENCOUNTER — VIRTUAL VISIT (OUTPATIENT)
Dept: FAMILY MEDICINE | Facility: OTHER | Age: 50
End: 2020-05-10

## 2020-05-10 VITALS
SYSTOLIC BLOOD PRESSURE: 160 MMHG | TEMPERATURE: 98.2 F | WEIGHT: 260 LBS | HEART RATE: 84 BPM | DIASTOLIC BLOOD PRESSURE: 108 MMHG | OXYGEN SATURATION: 97 % | RESPIRATION RATE: 16 BRPM | BODY MASS INDEX: 32.5 KG/M2

## 2020-05-10 DIAGNOSIS — J01.90 ACUTE SINUSITIS WITH SYMPTOMS > 10 DAYS: ICD-10-CM

## 2020-05-10 LAB
ALBUMIN SERPL-MCNC: 3.6 G/DL (ref 3.4–5)
ALBUMIN UR-MCNC: 30 MG/DL
ALP SERPL-CCNC: 56 U/L (ref 40–150)
ALT SERPL W P-5'-P-CCNC: 69 U/L (ref 0–70)
ANION GAP SERPL CALCULATED.3IONS-SCNC: 6 MMOL/L (ref 3–14)
APPEARANCE UR: ABNORMAL
AST SERPL W P-5'-P-CCNC: 26 U/L (ref 0–45)
BASOPHILS # BLD AUTO: 0.1 10E9/L (ref 0–0.2)
BASOPHILS NFR BLD AUTO: 0.9 %
BILIRUB SERPL-MCNC: 0.2 MG/DL (ref 0.2–1.3)
BILIRUB UR QL STRIP: ABNORMAL
BUN SERPL-MCNC: 13 MG/DL (ref 7–30)
CALCIUM SERPL-MCNC: 8.6 MG/DL (ref 8.5–10.1)
CHLORIDE SERPL-SCNC: 110 MMOL/L (ref 94–109)
CO2 SERPL-SCNC: 24 MMOL/L (ref 20–32)
COLOR UR AUTO: ABNORMAL
CREAT SERPL-MCNC: 0.92 MG/DL (ref 0.66–1.25)
CRP SERPL-MCNC: 3.8 MG/L (ref 0–8)
DIFFERENTIAL METHOD BLD: NORMAL
EOSINOPHIL NFR BLD AUTO: 2.4 %
ERYTHROCYTE [DISTWIDTH] IN BLOOD BY AUTOMATED COUNT: 12.6 % (ref 10–15)
GFR SERPL CREATININE-BSD FRML MDRD: >90 ML/MIN/{1.73_M2}
GLUCOSE SERPL-MCNC: 182 MG/DL (ref 70–99)
GLUCOSE UR STRIP-MCNC: 50 MG/DL
HBA1C MFR BLD: 5.9 % (ref 0–5.6)
HCT VFR BLD AUTO: 42.9 % (ref 40–53)
HGB BLD-MCNC: 15.1 G/DL (ref 13.3–17.7)
HGB UR QL STRIP: NEGATIVE
HYALINE CASTS #/AREA URNS LPF: 11 /LPF (ref 0–2)
IMM GRANULOCYTES # BLD: 0 10E9/L (ref 0–0.4)
IMM GRANULOCYTES NFR BLD: 0.6 %
KETONES UR STRIP-MCNC: 5 MG/DL
LEUKOCYTE ESTERASE UR QL STRIP: NEGATIVE
LYMPHOCYTES # BLD AUTO: 2.6 10E9/L (ref 0.8–5.3)
LYMPHOCYTES NFR BLD AUTO: 38.8 %
MCH RBC QN AUTO: 30.3 PG (ref 26.5–33)
MCHC RBC AUTO-ENTMCNC: 35.2 G/DL (ref 31.5–36.5)
MCV RBC AUTO: 86 FL (ref 78–100)
MONOCYTES # BLD AUTO: 0.5 10E9/L (ref 0–1.3)
MONOCYTES NFR BLD AUTO: 7.2 %
MUCOUS THREADS #/AREA URNS LPF: PRESENT /LPF
NEUTROPHILS # BLD AUTO: 3.3 10E9/L (ref 1.6–8.3)
NEUTROPHILS NFR BLD AUTO: 50.1 %
NITRATE UR QL: NEGATIVE
NRBC # BLD AUTO: 0 10*3/UL
NRBC BLD AUTO-RTO: 0 /100
PH UR STRIP: 5 PH (ref 5–7)
PLATELET # BLD AUTO: 172 10E9/L (ref 150–450)
POTASSIUM SERPL-SCNC: 4 MMOL/L (ref 3.4–5.3)
PROT SERPL-MCNC: 8.2 G/DL (ref 6.8–8.8)
RBC # BLD AUTO: 4.98 10E12/L (ref 4.4–5.9)
RBC #/AREA URNS AUTO: <1 /HPF (ref 0–2)
SODIUM SERPL-SCNC: 140 MMOL/L (ref 133–144)
SOURCE: ABNORMAL
SP GR UR STRIP: 1.03 (ref 1–1.03)
SQUAMOUS #/AREA URNS AUTO: <1 /HPF (ref 0–1)
UROBILINOGEN UR STRIP-MCNC: 4 MG/DL (ref 0–2)
WBC # BLD AUTO: 6.7 10E9/L (ref 4–11)
WBC #/AREA URNS AUTO: 1 /HPF (ref 0–5)

## 2020-05-10 PROCEDURE — 81001 URINALYSIS AUTO W/SCOPE: CPT | Performed by: NURSE PRACTITIONER

## 2020-05-10 PROCEDURE — 86140 C-REACTIVE PROTEIN: CPT | Performed by: NURSE PRACTITIONER

## 2020-05-10 PROCEDURE — 99284 EMERGENCY DEPT VISIT MOD MDM: CPT | Mod: 25 | Performed by: NURSE PRACTITIONER

## 2020-05-10 PROCEDURE — 71046 X-RAY EXAM CHEST 2 VIEWS: CPT | Mod: TC

## 2020-05-10 PROCEDURE — 83036 HEMOGLOBIN GLYCOSYLATED A1C: CPT | Performed by: NURSE PRACTITIONER

## 2020-05-10 PROCEDURE — 80053 COMPREHEN METABOLIC PANEL: CPT | Performed by: NURSE PRACTITIONER

## 2020-05-10 PROCEDURE — 85025 COMPLETE CBC W/AUTO DIFF WBC: CPT | Performed by: NURSE PRACTITIONER

## 2020-05-10 PROCEDURE — 87635 SARS-COV-2 COVID-19 AMP PRB: CPT | Performed by: NURSE PRACTITIONER

## 2020-05-10 PROCEDURE — 36415 COLL VENOUS BLD VENIPUNCTURE: CPT | Performed by: NURSE PRACTITIONER

## 2020-05-10 PROCEDURE — 99284 EMERGENCY DEPT VISIT MOD MDM: CPT | Mod: Z6 | Performed by: NURSE PRACTITIONER

## 2020-05-10 NOTE — ED AVS SNAPSHOT
[facility] Emergency Department  [address] [place] [address]  Phone:  [phone]  Fax:  [phone]                                    Victor M Mao   MRN: [id number]    Department:  [facility] Emergency Department   Date of Visit:  5/10/2020           After Visit Summary Signature Page    I have received my discharge instructions, and my questions have been answered. I have discussed any challenges I see with this plan with the nurse or doctor.    ..........................................................................................................................................  Patient/Patient Representative Signature      ..........................................................................................................................................  Patient Representative Print Name and Relationship to Patient    ..................................................               ................................................  Date                                   Time    ..........................................................................................................................................  Reviewed by Signature/Title    ...................................................              ..............................................  Date                                               Time          22EPIC Rev 08/18

## 2020-05-10 NOTE — ED TRIAGE NOTES
Pt here with cough, facial pressure, swollen lymph, chills, No fever.  Tested negative for Covid twice. Aches, weakness

## 2020-05-10 NOTE — DISCHARGE INSTRUCTIONS
Harshad, start the Augmentin tonight and take as directed  I would recommend a good probiotic such as Culturelle which you can find over-the-counter to take twice daily while on the Augmentin to prevent stomach upset and diarrhea.  You certainly can eat your Activia as well.  Please make sure you follow-up with Dr. Chowdhury in the next couple of weeks to discuss your elevated blood sugar today and further recommendations.  Please make sure you are drinking plenty of water.  Take care, I hope you feel better soon.`

## 2020-05-10 NOTE — ED PROVIDER NOTES
History     Chief Complaint   Patient presents with     Cough     HPI  Victor M Mao is a 49 year old male who presents to the emergency room today with ongoing symptoms of productive cough, intermittent headache, body aches, sinus pressure and dysuria.  Patient was treated for prostatitis with ciprofloxacin last month, he reports that his symptoms initially improved but now he reports urinating more frequently and some dysuria.  Patient does have a history of environmental allergies and has been using Flonase but this has not helped his sinus pressure.  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    Allergies:  Allergies   Allergen Reactions     Sulfa Drugs Anaphylaxis     Tetanus Toxoid      Other reaction(s): Wheezing       Problem List:    Patient Active Problem List    Diagnosis Date Noted     Umbilical hernia without obstruction and without gangrene 09/06/2019     Priority: Medium     Anxiety 10/30/2017     Priority: Medium     Benign essential hypertension 10/30/2017     Priority: Medium        Past Medical History:    Past Medical History:   Diagnosis Date     Hypertension 2013     Sleep apnea        Past Surgical History:    Past Surgical History:   Procedure Laterality Date     ESOPHAGOSCOPY, GASTROSCOPY, DUODENOSCOPY (EGD), COMBINED N/A 10/4/2019    Procedure: Esophagogastroduodenoscopy with Biopsy by Biopsy;  Surgeon: Charbel Jordan DO;  Location:  GI     LAPAROSCOPIC HERNIORRHAPHY UMBILICAL N/A 11/14/2019    Procedure: Laparoscopic Umbilical Herniorrhaphy with Mesh;  Surgeon: Charbel Jordan DO;  Location:  OR       Family History:    No family history on file.    Social History:  Marital Status:  Single [1]  Social History     Tobacco Use     Smoking status:  Never Smoker     Smokeless tobacco: Never Used   Substance Use Topics     Alcohol use: Yes     Comment: occasional 2 times week     Drug use: No        Medications:    ciprofloxacin (CIPRO) 500 MG tablet  citalopram (CELEXA) 40 MG tablet  finasteride (PROSCAR) 5 MG tablet  fluticasone (FLONASE) 50 MCG/ACT nasal spray  lisinopril-hydrochlorothiazide (PRINZIDE/ZESTORETIC) 20-12.5 MG tablet  LORazepam (ATIVAN) 0.5 MG tablet  omeprazole (PRILOSEC) 40 MG DR capsule  sildenafil (VIAGRA) 100 MG tablet          Review of Systems   All other systems reviewed and are negative.      Physical Exam   BP: (!) 162/116  Pulse: 88  Temp: 98.2  F (36.8  C)  Resp: 16  Weight: 117.9 kg (260 lb)  SpO2: 97 %      Physical Exam  Constitutional:       Appearance: Normal appearance.   HENT:      Head: Normocephalic.      Right Ear: Tympanic membrane normal.      Left Ear: Tympanic membrane normal.      Nose: Nose normal.      Comments: Bilateral frontal and maxillary sinus pressure/tenderness  Eyes:      Extraocular Movements: Extraocular movements intact.   Neck:      Musculoskeletal: Normal range of motion.   Cardiovascular:      Rate and Rhythm: Normal rate and regular rhythm.   Pulmonary:      Effort: Pulmonary effort is normal. No respiratory distress.      Breath sounds: Normal breath sounds.   Abdominal:      General: Bowel sounds are normal.      Palpations: Abdomen is soft.   Musculoskeletal: Normal range of motion.   Lymphadenopathy:      Cervical: No cervical adenopathy.   Skin:     General: Skin is warm and dry.      Capillary Refill: Capillary refill takes less than 2 seconds.   Neurological:      General: No focal deficit present.      Mental Status: He is alert.   Psychiatric:         Mood and Affect: Mood normal.         ED Course     Procedures    Results for orders placed or performed during the hospital encounter of 05/10/20 (from the past 24 hour(s))   XR Chest 2 Views    Narrative    CHEST TWO VIEWS 5/10/2020 4:12 PM      HISTORY: Cough    COMPARISON: May 28, 2019       Impression    IMPRESSION: There are no acute infiltrates. The cardiac silhouette is  not enlarged. Pulmonary vasculature is unremarkable.    AGATHA THRASHER MD   UA with Microscopic   Result Value Ref Range    Color Urine Keely     Appearance Urine Slightly Cloudy     Glucose Urine 50 (A) NEG^Negative mg/dL    Bilirubin Urine Small (A) NEG^Negative    Ketones Urine 5 (A) NEG^Negative mg/dL    Specific Gravity Urine 1.028 1.003 - 1.035    Blood Urine Negative NEG^Negative    pH Urine 5.0 5.0 - 7.0 pH    Protein Albumin Urine 30 (A) NEG^Negative mg/dL    Urobilinogen mg/dL 4.0 (H) 0.0 - 2.0 mg/dL    Nitrite Urine Negative NEG^Negative    Leukocyte Esterase Urine Negative NEG^Negative    Source Midstream Urine     WBC Urine 1 0 - 5 /HPF    RBC Urine <1 0 - 2 /HPF    Squamous Epithelial /HPF Urine <1 0 - 1 /HPF    Mucous Urine Present (A) NEG^Negative /LPF    Hyaline Casts 11 (H) 0 - 2 /LPF   CBC with platelets differential   Result Value Ref Range    WBC 6.7 4.0 - 11.0 10e9/L    RBC Count 4.98 4.4 - 5.9 10e12/L    Hemoglobin 15.1 13.3 - 17.7 g/dL    Hematocrit 42.9 40.0 - 53.0 %    MCV 86 78 - 100 fl    MCH 30.3 26.5 - 33.0 pg    MCHC 35.2 31.5 - 36.5 g/dL    RDW 12.6 10.0 - 15.0 %    Platelet Count 172 150 - 450 10e9/L    Diff Method Automated Method     % Neutrophils 50.1 %    % Lymphocytes 38.8 %    % Monocytes 7.2 %    % Eosinophils 2.4 %    % Basophils 0.9 %    % Immature Granulocytes 0.6 %    Nucleated RBCs 0 0 /100    Absolute Neutrophil 3.3 1.6 - 8.3 10e9/L    Absolute Lymphocytes 2.6 0.8 - 5.3 10e9/L    Absolute Monocytes 0.5 0.0 - 1.3 10e9/L    Absolute Basophils 0.1 0.0 - 0.2 10e9/L    Abs Immature Granulocytes 0.0 0 - 0.4 10e9/L    Absolute Nucleated RBC 0.0    Comprehensive metabolic panel   Result Value Ref Range    Sodium 140 133 - 144 mmol/L    Potassium 4.0 3.4 - 5.3 mmol/L    Chloride 110 (H) 94 - 109 mmol/L    Carbon Dioxide 24 20 - 32 mmol/L     Anion Gap 6 3 - 14 mmol/L    Glucose 182 (H) 70 - 99 mg/dL    Urea Nitrogen 13 7 - 30 mg/dL    Creatinine 0.92 0.66 - 1.25 mg/dL    GFR Estimate >90 >60 mL/min/[1.73_m2]    GFR Estimate If Black >90 >60 mL/min/[1.73_m2]    Calcium 8.6 8.5 - 10.1 mg/dL    Bilirubin Total 0.2 0.2 - 1.3 mg/dL    Albumin 3.6 3.4 - 5.0 g/dL    Protein Total 8.2 6.8 - 8.8 g/dL    Alkaline Phosphatase 56 40 - 150 U/L    ALT 69 0 - 70 U/L    AST 26 0 - 45 U/L   CRP inflammation   Result Value Ref Range    CRP Inflammation 3.8 0.0 - 8.0 mg/L   Hemoglobin A1c   Result Value Ref Range    Hemoglobin A1C 5.9 (H) 0 - 5.6 %       Medications - No data to display    Assessments & Plan (with Medical Decision Making)  Victor M is a 49-year-old male, history of hypertension and sleep apnea, presents with cough, sinus congestion, pressure, body aches, dysuria, intermittent headaches really on and off for the last 2 weeks.  Please refer to HPI and focused exam.  Patient arrives here hypertensive, he is otherwise hemodynamically stable and afebrile, he is well-hydrated on exam, he is nontoxic-appearing, patient is in no acute respiratory distress.  Patient has had 2- COVID swabs so I do not feel that this is the cause of his symptoms today certainly there could be other viral etiology, pneumonia is on the differential but I feel this is less likely given his lack of fever and reassuring exam.  Chest x-ray was obtained and is negative for any acute cardiopulmonary findings.  Given patient's recent virtual visit and treatment of prostatitis with return of some frequent urination I did obtain a urinalysis today which is negative for infection, however, there is 50 glucose and 5 ketones in patient's urine.  Specific gravity is normal.  Patient also is passing small amounts of bilirubin, he reports he has not had a lot of fluids to drink today, I ordered additional blood work to evaluate his electrolyte status.  1815-patient's blood sugar returns elevated at  182, the rest of his blood work is 6.  Hemoglobin A1c is elevated at 5.9 putting patient in the prediabetic range, I had a long discussion with patient regarding this, I am going to hold off on starting him on anything such as metformin today as I am going to treat him with Augmentin for his sinusitis and given the potential for this to cause GI upset and diarrhea I will wait defer this to his primary care provider once his Augmentin is completed.  I did discuss with patient that I did want him seen in the next 2 weeks for reevaluation.  Patient was instructed to stay well-hydrated.  Patient is requesting a third COVID swab in order to return to work at the [employer] at the [employer] this was obtained and is pending.  I am not highly suspicious for COVID at this time especially in light of his 2 previous negative swabs.  I did recommend patient take a good probiotic while on the Augmentin.  Reasons to return to the emergency room were discussed in detail.  Patient is agreeable to plan of care and discharged in stable condition.     I have reviewed the nursing notes.    I have reviewed the findings, diagnosis, plan and need for follow up with the patient.      New Prescriptions    AMOXICILLIN-CLAVULANATE (AUGMENTIN) 875-125 MG TABLET    Take 1 tablet by mouth 2 times daily       Final diagnoses:   Acute sinusitis with symptoms > 10 days       5/10/2020   [facility] EMERGENCY DEPARTMENT     Rylee Atkins, DAHLIA CNP  05/10/20 1818

## 2020-05-10 NOTE — PROGRESS NOTES
"Date: 05/10/2020 13:42:50  Clinician: Michelle Wall  Clinician NPI: [id number]  Patient: Victor M Mao  Patient : 1970  Patient Address: [address] [place] [address] [place] [address]  Patient Phone: [phone]  Visit Protocol: URI  Patient Summary:  Victor M is a 49 year old ( : 1970 ) male who initiated a Visit for cold, sinus infection, or influenza. When asked the question \"Please sign me up to receive news, health information and promotions. \", Victor M responded \"Yes\".    Victor M states his symptoms started gradually 10-13 days ago. After his symptoms started, they improved and then got worse again.   His symptoms consist of myalgia, rhinitis, facial pain or pressure, a sore throat, a cough, nasal congestion, nausea, tooth pain, ageusia, anosmia, ear pain, a headache, malaise, wheezing, enlarged lymph nodes, and chills. He is experiencing mild difficulty breathing with activities but can speak normally in full sentences.   Symptom details     Nasal secretions: The color of his mucus is clear and white.    Cough: Victor M coughs a few times an hour and his cough is more bothersome at night. Phlegm comes into his throat when he coughs. He believes his cough is caused by post-nasal drip. The color of the phlegm is clear and white.     Sore throat: Victor M reports having moderate throat pain (4-6 on a 10 point pain scale), does not have exudate on his tonsils, and can swallow liquids. The lymph nodes in his neck are enlarged. A rash has not appeared on the skin since the sore throat started.     Wheezing: Victor M has not ever been diagnosed with asthma. The wheezing interferes with his normal daily activities.    Facial pain or pressure: The facial pain or pressure feels worse when bending over or leaning forward.     Headache: He states the headache is moderate (4-6 on a 10 point pain scale).     Tooth pain: The tooth pain is not caused by a cavity, recent dental work, or other mouth problems.      " Victor M denies having fever, vomiting, and diarrhea. He also denies having recent facial or sinus surgery in the past 60 days.   Precipitating events  Within the past week, Victor M has not been exposed to someone with strep throat. He has not recently been exposed to someone with influenza. Victor M has not been in close contact with any high risk individuals.   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 He does not provide direct patient care. 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  My role takes me on patient care floors to inspect supplies.  I also oversee CSP which cleans and disinfects medical equipment such as: IV pumps, poles , PAPRs Airborne carts, commodes etc&gt;   He does not live with a healthcare worker.   Victor M has had a close contact with a laboratory-confirmed COVID-19 patient within 14 days of symptom onset. He also has had a close contact with a suspected COVID-19 patient within 14 days of symptom onset. He was exposed at his work. Additional information about contact with COVID-19 (Coronavirus) patient as reported by the patient (free text): I was briefly exposed to an employee on 3/25 and this employee was later confirmed with covid19.  I started not feeling well on 3/30 at which point I was removed from work.  I've since had 2 swab tests which have come back negative.  The most recent was on Friday.  I have been in quarantine since 3/30 and have only left my property to get the swab tests.  I do use a norma pot with strong salt water to help with my day to day symptoms, but I've been told that this shouldn't have altered results   Triage Point(s) temporarily suspended for COVID-19 (Coronavirus) screening  Victor M reported the following symptoms which were previously protocol  referral points. These protocol referral points have temporarily been removed for purposes of COVID-19 (Coronavirus) screening.   Wheezing that keeps Victor M from doing daily activities   Pertinent medical history  Victor M had 1 sinus infection within the past year.   Victor M has taken an antibiotic medication in the past month. Antibiotic details as reported by the patient (free text): I was taking Cipro for a prostate infection,  I was on a 2 week dose   Victor M needs a return to work/school note.   Weight: 261 lbs   Victor M does not smoke or use smokeless tobacco.   Additional information as reported by the patient (free text): I do get frequent sinus infections and have been treated for respiratory infections  about once a year.   Weight: 261 lbs    MEDICATIONS: Flonase Allergy Relief nasal, finasteride oral, citalopram oral, omeprazole oral, ALLERGIES: Sulfa (Sulfonamide Antibiotics)  Clinician Response:  Dear Victor M,   Your symptoms show that you may have coronavirus (COVID-19). This illness can cause fever, cough and trouble breathing. Many people get a mild case and get better on their own. Some people can get very sick.   Will I be tested for COVID-19?  We would recommend you be tested for coronavirus. Here is how to get that scheduled:   Call [phone]. Tell them you were referred by [facility] to have a COVID-19 test. You will be scheduled at one of our [facility] testing locations (drive-up). Please have your [facility] visit information ready when you call including your visit ID number to verify you were referred.    How can I protect others in the meantime?  First, stay home and away from others (self-isolate) until:   You've had no fever---and no medicine that reduces fever---for 3 full days (72 hours). And...    Your other symptoms have gotten better. For example, your cough or breathing has improved. And...    At least 10 days have passed since your symptoms started.   During this time:   Don't go to  work, school or anywhere else.     Stay away from others in your home. No hugging, kissing or shaking hands.    Don't let anyone visit.    Cover your mouth and nose with a mask, tissue or wash cloth to avoid spreading germs.    Wash your hands and face often. Use soap and water.   How can I take care of myself?  1.Take Tylenol (acetaminophen) for fever or pain. If you have liver or kidney problems, ask your family doctor if it's okay to take Tylenol.   Adults can take either:    650 mg (two 325 mg pills) every 4 to 6 hours, or...    1,000 mg (two 500 mg pills) every 8 hours as needed.     Note: Don't take more than 3,000 mg in one day.   For children, check the Tylenol bottle for the right dose. The dose is based on the child's age or weight.  2.If you have other health problems (like cancer, heart failure, an organ transplant or severe kidney disease): Call your specialty clinic if you don't feel better in the next 2 days.  3.Know when to call 911: If your breathing is so bad that it keeps you from doing normal activities, call 911 or go to the emergency room. Tell them that you've been staying home and may have COVID-19.  4.Sign up for [employer]. We know it's scary to hear that you might have COVID-19. We want to track your symptoms to make sure you're okay over the next 2 weeks. Please look for an email from [employer]---this is a free, online program that we'll use to keep in touch. To sign up, follow the link in the email. Learn more at [email or web address].  Where can I get more information?  To learn more about COVID-19 and how to care for yourself at home, please visit the CDC website at https://www.cdc.gov/coronavirus/2019-ncov/about/steps-when-sick.html.  For more about your care at [facility], please visit [email or web address]     Diagnosis: Cough  Diagnosis ICD: R05

## 2020-05-11 ENCOUNTER — PATIENT OUTREACH (OUTPATIENT)
Dept: CARE COORDINATION | Facility: CLINIC | Age: 50
End: 2020-05-11

## 2020-05-11 DIAGNOSIS — Z20.822 SUSPECTED COVID-19 VIRUS INFECTION: Primary | ICD-10-CM

## 2020-05-11 LAB
SARS-COV-2 RNA SPEC QL NAA+PROBE: NOT DETECTED
SPECIMEN SOURCE: NORMAL

## 2020-05-11 ASSESSMENT — ACTIVITIES OF DAILY LIVING (ADL): DEPENDENT_IADLS:: INDEPENDENT

## 2020-05-11 NOTE — PROGRESS NOTES
Clinic Care Coordination Contact    Clinic Care Coordination Contact  OUTREACH    Referral Information:  Referral Source: IP Report    Primary Diagnosis: ENT    Chief Complaint   Patient presents with     Clinic Care Coordination - Post Hospital     RN assessment-ED follow up     Universal Utilization:   Clinic Utilization  Difficulty keeping appointments:: No  Compliance Concerns: No  No-Show Concerns: No  No PCP office visit in Past Year: No  Utilization    Last refreshed: 5/11/2020  9:24 AM:  Hospital Admissions 2           Last refreshed: 5/11/2020  9:24 AM:  ED Visits 2           Last refreshed: 5/11/2020  9:24 AM:  No Show Count (past year) 0              Current as of: 5/11/2020  9:24 AM            Clinical Concerns:  Current Medical Concerns:  Called and spoke with pt, introduced self and role.  Pt was recently in the ED for ongoing sinus congestion, and cough.  Pt states he had two previous phone visits one in urgent care and one family practice for the same thing.  He was tested each time for Covid-19 and two were negative and one is pending from this ED visit.  Pt states he did get antibiotics and is starting to feel a little better.  It was noted that pt had an elevated blood sugar while in the ED and was told to follow up with PCP once antibiotics are completed.  Pt also had an elevated blood pressure but states he did not take his medications that morning. He is wanting to make a follow up virtual visit with PCP to discuss. Appt scheduled for 5/22/2020.  Pt will remain home until he is asystematic.    Current Behavioral Concerns: no current concern    Education Provided to patient: how to obtain results of Covid-19.    Pain  Pain (GOAL):: No  Health Maintenance Reviewed: Not assessed  Clinical Pathway: None    Medication Management:  Self managements     Functional Status:  Dependent ADLs:: Independent  Dependent IADLs:: Independent  Bed or wheelchair confined:: No  Mobility Status:  Independent    Living Situation:    Lifestyle & Psychosocial Needs:    Diet:: Regular  Inadequate nutrition (GOAL):: No  Tube Feeding: No  Inadequate activity/exercise (GOAL):: No  Transportation means:: Regular car     Informal Support system:: Family   Socioeconomic History     Marital status: Single     Spouse name: Not on file     Number of children: Not on file     Years of education: Not on file     Highest education level: Not on file     Tobacco Use     Smoking status: Never Smoker     Smokeless tobacco: Never Used   Substance and Sexual Activity     Alcohol use: Yes     Comment: occasional 2 times week     Drug use: No     Sexual activity: Yes     Partners: Female      Resources and Interventions:  Current Resources:      Community Resources: None  Supplies used at home:: None  Equipment Currently Used at Home: none    Referrals Placed: Other ([facility] Referral)     Goals: NA    Patient/Caregiver understanding: Pt states she has understanding of COVID-19 precautions, signs and symptoms.        Future Appointments              In 1 week Lee Chowdhury DO [facility]          Plan:   Pt will follow up in clinic as recommended for his high blood sugars  Get Well Loop sent  No further outreach by [facility] as pt declined needs.       Josy ROBINSN, RN, PHN, CCM  Primary Clinic Care Coordination    [facility]  [email or web address]   Office: [phone]  Employed by [employer]

## 2020-05-11 NOTE — LETTER
Instructions for Patients      Whether or not you've been tested for COVID-19    Stay home and away from others (self-isolate) until:    At least 10 days have passed since your symptoms started. And     You've had no fever--and no medicine that reduces fever--for 3 full days (72 hours). And      Your other symptoms have resolved (gotten better).     During this time:    Stay in your own room (and use your own bathroom), if you can.    Stay away from others in your home. No hugging, kissing or shaking hands.    No visitors.    Don't go to work, school or anywhere else.     Clean  high touch  surfaces often (doorknobs, counters, handles, etc.). Use a household cleaning spray or wipes.    Cover your mouth and nose with a mask, tissue or wash cloth to avoid spreading germs.    Wash your hands and face often. Use soap and water.    For more tips, go to https://www.cdc.gov/coronavirus/2019-ncov/downloads/10Things.pdf.    How can I take care of myself?    1. Get lots of rest. Drink extra fluids (unless a doctor has told you not to).     2. Take Tylenol (acetaminophen) for fever or pain. If you have liver or kidney problems, ask your family doctor if it's okay to take Tylenol.     Adults can take either:     650 mg (two 325 mg pills) every 4 to 6 hours, or     1,000 mg (two 500 mg pills) every 8 hours as needed.     Note: Don't take more than 3,000 mg in one day.   Acetaminophen is found in many medicines (both prescribed and over-the-counter medicines). Read all labels to be sure you don't take too much.   For children, check the Tylenol bottle for the right dose. The dose is based on  the child's age or weight.    3. If you have other health problems (like cancer, heart failure, an organ transplant or severe kidney disease): Call your specialty clinic if you don't feel better in the next 2 days.    4. Know when to call 911: If your breathing is so bad that it keeps you from doing normal activities, call 911 or go to the  emergency room. Tell them that you've been staying home and may have COVID-19..      What are the symptoms of COVID-19?     The most common symptoms are cough, fever and trouble breathing.     Less common symptoms include body aches, chills, diarrhea (loose, watery poops), fatigue (feeling very tired), headache, runny nose, sore throat and loss of smell.     COVID-19 can cause severe coughing (bronchitis) and lung infection (pneumonia).    How does it spread?     The virus may spread when a person coughs or sneezes into the air. The virus can travel about 6 feet this way, and it can live on surfaces.      Common  (household disinfectants) will kill the virus.    Who is at risk?  Anyone can catch COVID-19 if they're around someone who has the virus.    How can others protect themselves?     Stay away from people who have COVID-19 (or symptoms of COVID-19).    Wash hands often with soap and water. Or, use hand  with at least 60% alcohol.    Avoid touching the eyes, nose or mouth.     Wear a face mask when you go out in public, when sick or when caring for a sick person.      For more about COVID-19 and caring for yourself at home, please visit the CDC website at https://www.cdc.gov/coronavirus/2019-ncov/about/steps-when-sick.html.     To learn about care at [facility], go to [email or web address].    Below are the COVID-19 hotlines at the [facility] ([facility]). Interpreters are available.     For health questions: Call 979-878-2549 or 1-792.430.5130 (7 a.m. to 7 p.m.)    For questions about schools and childcare: Call 940-962-8099 or 1-237.418.8576 (7 a.m. to 7 p.m.)      Thank you for taking steps to prevent the spread of this virus.  o Limit your contact with others.  o Wear a simple mask to cover your cough.  o Wash your hands well and often.  o If you need medical care, go to [email or web address] or contact your health care provider.     For more about  COVID-19 and caring for yourself at home, visit the CDC website at https://www.cdc.gov/coronavirus/2019-ncov/about/steps-when-sick.html.     To learn about care at [facility], please go to [email or web address].     Below are the COVID-19 hotlines at the [place] [facility] ([facility]). Interpreters are available.     For health questions: Call 076-543-7085 or 1-857.639.4898 (7 a.m. to 7 p.m.)    For questions about schools and childcare: Call 737-669-5697 or 1-365.249.6278 (7 a.m. to 7 p.m.)

## 2020-05-22 ENCOUNTER — VIRTUAL VISIT (OUTPATIENT)
Dept: FAMILY MEDICINE | Facility: CLINIC | Age: 50
End: 2020-05-22
Payer: COMMERCIAL

## 2020-05-22 DIAGNOSIS — I10 BENIGN ESSENTIAL HYPERTENSION: ICD-10-CM

## 2020-05-22 DIAGNOSIS — J30.2 SEASONAL ALLERGIC RHINITIS, UNSPECIFIED TRIGGER: ICD-10-CM

## 2020-05-22 DIAGNOSIS — F41.9 ANXIETY: ICD-10-CM

## 2020-05-22 DIAGNOSIS — R73.09 ELEVATED GLUCOSE: Primary | ICD-10-CM

## 2020-05-22 PROCEDURE — 99214 OFFICE O/P EST MOD 30 MIN: CPT | Mod: TEL | Performed by: INTERNAL MEDICINE

## 2020-05-22 RX ORDER — FLUTICASONE PROPIONATE 50 MCG
1 SPRAY, SUSPENSION (ML) NASAL DAILY
Qty: 16 ML | Refills: 3 | Status: SHIPPED | OUTPATIENT
Start: 2020-05-22 | End: 2021-07-05

## 2020-05-22 NOTE — PROGRESS NOTES
"Victor M Mao is a 49 year old male who is being evaluated via a billable telephone visit.      The patient has been notified of following:     \"This telephone visit will be conducted via a call between you and your physician/provider. We have found that certain health care needs can be provided without the need for a physical exam.  This service lets us provide the care you need with a short phone conversation.  If a prescription is necessary we can send it directly to your pharmacy.  If lab work is needed we can place an order for that and you can then stop by our lab to have the test done at a later time.    Telephone visits are billed at different rates depending on your insurance coverage. During this emergency period, for some insurers they may be billed the same as an in-person visit.  Please reach out to your insurance provider with any questions.    If during the course of the call the physician/provider feels a telephone visit is not appropriate, you will not be charged for this service.\"    Patient has given verbal consent for Telephone visit?  Yes    What phone number would you like to be contacted at?     How would you like to obtain your AVS? Chrishart    Subjective     Victor M Mao is a 49 year old male who presents via phone visit today for the following health issues:    Seen in the ED and had an elevated A1C of 5.9.  So following up on that.                          Chief Complaint         The patient is a pleasant 49-year-old male who recently was in the emergency department with acute sinusitis and placed on Augmentin.  His symptoms are now resolved.  He is concerned regarding possible recurrence of the infection I have suggested that extended use antibiotics beyond the 2 weeks increases his risk of C. difficile etc. in excess of the benefit as he is currently completely asymptomatic.  He would like to continue his Flonase and I have sent over prescription for this.  He notes that his prostatitis " has resolved.  His biggest concern is that of a recent blood test that demonstrated a slightly elevated glucose with an essentially normal A1c.  This was done while he was feeling sick, somewhat dehydrated, and eating quite poorly.  It is quite artifactual and I have placed orders to have the A1c and blood sugar repeated in about 2 months.  Overall, he is doing well.  His past history is notably positive for hypertension and some anxiety.  These are controlled on his current medications.                         PAST, FAMILY,SOCIAL HISTORY:     Medical  History:   has a past medical history of Hypertension (2013) and Sleep apnea.     Surgical History:   has a past surgical history that includes Esophagoscopy, gastroscopy, duodenoscopy (EGD), combined (N/A, 10/4/2019) and Laparoscopic herniorrhaphy umbilical (N/A, 11/14/2019).     Social History:   reports that he has never smoked. He has never used smokeless tobacco. He reports current alcohol use. He reports that he does not use drugs.     Family History:  family history is not on file.            MEDICATIONS  Current Outpatient Medications   Medication Sig Dispense Refill     amoxicillin-clavulanate (AUGMENTIN) 875-125 MG tablet Take 1 tablet by mouth 2 times daily 28 tablet 0     citalopram (CELEXA) 40 MG tablet Take 1 tablet (40 mg) by mouth daily 90 tablet 3     finasteride (PROSCAR) 5 MG tablet Take 1 pill daily 90 tablet 3     fluticasone (FLONASE) 50 MCG/ACT nasal spray Spray 1 spray into both nostrils daily 16 mL 3     lisinopril-hydrochlorothiazide (PRINZIDE/ZESTORETIC) 20-12.5 MG tablet Take 1 tablet by mouth daily 90 tablet 3     LORazepam (ATIVAN) 0.5 MG tablet Take 2 tablets (1 mg) by mouth every 8 hours as needed for anxiety 60 tablet 0     omeprazole (PRILOSEC) 40 MG DR capsule Take 1 capsule (40 mg) by mouth daily 90 capsule 3     sildenafil (VIAGRA) 100 MG tablet Take 1 tablet (100 mg) by mouth daily as needed (Erectile dysfunction) 10 tablet 3          --------------------------------------------------------------------------------------------------------------------                                Review of Systems       LUNGS: Pt denies: cough, excess sputum, hemoptysis, or shortness of breath.   HEART: Pt denies: chest pain, arrhythmia, syncope, tachy or bradyarrhythmia.   GI: Pt denies: nausea, vomiting, diarrhea, constipation, melena, or hematochezia.   NEURO: Pt denies: seizures, strokes, diplopia, weakness, paraesthesias, or paralysis.   SKIN: Pt denies: itching, rashes, discoloration, or specific lesions of concern. Denies recent hair loss.   PSYCH: The patient denies significant depression, anxiety, mood imbalance. Specifically denies any suicidal ideation.                                        No physical examination is performed as this is a virtual visit.  The patient's voice is strong, there is no evidence of labored breathing or wheezing.  The patient is alert and appropriate and demonstrates no obvious behavioral irregularities.           Decision Making       1. Elevated glucose  Recheck lab work in a couple months.  Recommend restriction of carbohydrates as possible  - Hemoglobin A1c; Future  - Comprehensive metabolic panel (BMP + Alb, Alk Phos, ALT, AST, Total. Bili, TP); Future    2. Seasonal allergic rhinitis, unspecified trigger  Continue Flonase over-the-counter antihistamines as needed- fluticasone (FLONASE) 50 MCG/ACT nasal spray; Spray 1 spray into both nostrils daily  Dispense: 16 mL; Refill: 3    3. Anxiety  Stable with reassurance    4. Benign essential hypertension  Continue current medication                             FOLLOW UP   I have asked the patient to make an appointment for followup with me in 3 months for face-to-face with lab work            I have carefully explained the diagnosis and treatment options to the patient.  The patient has displayed an understanding of the above, and all subsequent questions were  answered.      DO JUAN Mas    Portions of this note were produced using Samb[employer]on  Although every attempt at real-time proof reading has been made, occasional grammar/syntax errors may have been missed.      Telephone time: 18 notes

## 2020-09-14 DIAGNOSIS — I10 BENIGN ESSENTIAL HYPERTENSION: ICD-10-CM

## 2020-09-15 RX ORDER — LISINOPRIL AND HYDROCHLOROTHIAZIDE 12.5; 2 MG/1; MG/1
TABLET ORAL
Qty: 90 TABLET | Refills: 0 | Status: SHIPPED | OUTPATIENT
Start: 2020-09-15 | End: 2020-12-14

## 2020-09-15 NOTE — TELEPHONE ENCOUNTER
Routing refill request to provider for review/approval because:  Labs out of range:  BP    SHIMON GonzalesN, RN  [facility]

## 2020-09-25 ENCOUNTER — OFFICE VISIT (OUTPATIENT)
Dept: FAMILY MEDICINE | Facility: CLINIC | Age: 50
End: 2020-09-25
Payer: COMMERCIAL

## 2020-09-25 VITALS
HEART RATE: 90 BPM | HEIGHT: 75 IN | DIASTOLIC BLOOD PRESSURE: 82 MMHG | SYSTOLIC BLOOD PRESSURE: 138 MMHG | WEIGHT: 264 LBS | TEMPERATURE: 98.5 F | OXYGEN SATURATION: 97 % | RESPIRATION RATE: 18 BRPM | BODY MASS INDEX: 32.83 KG/M2

## 2020-09-25 DIAGNOSIS — N40.1 BENIGN PROSTATIC HYPERPLASIA WITH URINARY FREQUENCY: Primary | ICD-10-CM

## 2020-09-25 DIAGNOSIS — Z88.2 ALLERGY TO SULFA DRUGS: ICD-10-CM

## 2020-09-25 DIAGNOSIS — Z11.4 ENCOUNTER FOR SCREENING FOR HUMAN IMMUNODEFICIENCY VIRUS (HIV): ICD-10-CM

## 2020-09-25 DIAGNOSIS — R35.0 BENIGN PROSTATIC HYPERPLASIA WITH URINARY FREQUENCY: Primary | ICD-10-CM

## 2020-09-25 DIAGNOSIS — R73.09 ELEVATED GLUCOSE: ICD-10-CM

## 2020-09-25 LAB
ALBUMIN SERPL-MCNC: 4.2 G/DL (ref 3.4–5)
ALBUMIN UR-MCNC: NEGATIVE MG/DL
ALP SERPL-CCNC: 60 U/L (ref 40–150)
ALT SERPL W P-5'-P-CCNC: 88 U/L (ref 0–70)
ANION GAP SERPL CALCULATED.3IONS-SCNC: 8 MMOL/L (ref 3–14)
APPEARANCE UR: CLEAR
AST SERPL W P-5'-P-CCNC: 44 U/L (ref 0–45)
BILIRUB SERPL-MCNC: 0.6 MG/DL (ref 0.2–1.3)
BILIRUB UR QL STRIP: NEGATIVE
BUN SERPL-MCNC: 16 MG/DL (ref 7–30)
CALCIUM SERPL-MCNC: 9.7 MG/DL (ref 8.5–10.1)
CHLORIDE SERPL-SCNC: 105 MMOL/L (ref 94–109)
CO2 SERPL-SCNC: 23 MMOL/L (ref 20–32)
COLOR UR AUTO: YELLOW
CREAT SERPL-MCNC: 1.11 MG/DL (ref 0.66–1.25)
GFR SERPL CREATININE-BSD FRML MDRD: 77 ML/MIN/{1.73_M2}
GLUCOSE SERPL-MCNC: 95 MG/DL (ref 70–99)
GLUCOSE UR STRIP-MCNC: NEGATIVE MG/DL
HBA1C MFR BLD: 5.7 % (ref 0–5.6)
HGB UR QL STRIP: NEGATIVE
KETONES UR STRIP-MCNC: NEGATIVE MG/DL
LEUKOCYTE ESTERASE UR QL STRIP: NEGATIVE
MUCOUS THREADS #/AREA URNS LPF: PRESENT /LPF
NITRATE UR QL: NEGATIVE
PH UR STRIP: 6 PH (ref 5–7)
POTASSIUM SERPL-SCNC: 3.9 MMOL/L (ref 3.4–5.3)
PROT SERPL-MCNC: 8.6 G/DL (ref 6.8–8.8)
PSA SERPL-MCNC: 0.47 UG/L (ref 0–4)
RBC #/AREA URNS AUTO: 0 /HPF (ref 0–2)
SODIUM SERPL-SCNC: 136 MMOL/L (ref 133–144)
SOURCE: ABNORMAL
SP GR UR STRIP: 1.01 (ref 1–1.03)
UROBILINOGEN UR STRIP-MCNC: 0 MG/DL (ref 0–2)
WBC #/AREA URNS AUTO: 0 /HPF (ref 0–5)

## 2020-09-25 PROCEDURE — 80053 COMPREHEN METABOLIC PANEL: CPT | Performed by: INTERNAL MEDICINE

## 2020-09-25 PROCEDURE — 83036 HEMOGLOBIN GLYCOSYLATED A1C: CPT | Performed by: INTERNAL MEDICINE

## 2020-09-25 PROCEDURE — 87491 CHLMYD TRACH DNA AMP PROBE: CPT | Performed by: FAMILY MEDICINE

## 2020-09-25 PROCEDURE — 81001 URINALYSIS AUTO W/SCOPE: CPT | Performed by: FAMILY MEDICINE

## 2020-09-25 PROCEDURE — 87591 N.GONORRHOEAE DNA AMP PROB: CPT | Performed by: FAMILY MEDICINE

## 2020-09-25 PROCEDURE — 36415 COLL VENOUS BLD VENIPUNCTURE: CPT | Performed by: INTERNAL MEDICINE

## 2020-09-25 PROCEDURE — 99214 OFFICE O/P EST MOD 30 MIN: CPT | Performed by: FAMILY MEDICINE

## 2020-09-25 PROCEDURE — 87389 HIV-1 AG W/HIV-1&-2 AB AG IA: CPT | Performed by: INTERNAL MEDICINE

## 2020-09-25 PROCEDURE — 84153 ASSAY OF PSA TOTAL: CPT | Performed by: INTERNAL MEDICINE

## 2020-09-25 RX ORDER — DOXAZOSIN 2 MG/1
TABLET ORAL
Qty: 60 TABLET | Refills: 4 | Status: SHIPPED | OUTPATIENT
Start: 2020-09-25 | End: 2021-02-19

## 2020-09-25 ASSESSMENT — PAIN SCALES - GENERAL: PAINLEVEL: MILD PAIN (2)

## 2020-09-25 ASSESSMENT — MIFFLIN-ST. JEOR: SCORE: 2148.13

## 2020-09-25 ASSESSMENT — PATIENT HEALTH QUESTIONNAIRE - PHQ9: SUM OF ALL RESPONSES TO PHQ QUESTIONS 1-9: 4

## 2020-09-25 NOTE — PROGRESS NOTES
"Subjective     Victor M Mao is a 50 year old male who presents to clinic today for the following health issues:    HPI       Genitourinary - Male  Onset/Duration: The last months.   Description:   Dysuria (painful urination): no}  Hematuria (blood in urine): no  Frequency: YES  Waking at night to urinate: YES  Hesitancy (delay in urine): YES  Retention (unable to empty): YES  Decrease in urinary flow: YES  Incontinence: no  Progression of Symptoms:  worsening  Accompanying Signs & Symptoms:  Fever: no  Back/Flank pain: no  Urethral discharge: no  Testicle lumps/masses/pain: no  Nausea and/or vomiting: no  Abdominal pain: no  History:   History of frequent UTI s: yes when younger in 20's  History of kidney stones: no  History of hernias: YES, umbilical   Personal or Family history of Prostate problems: YES  Sexually active: has some issues with ED.   Precipitating or alleviating factors: None  Therapies tried and outcome: course of antibiotics -  and increase fluid intake    History of past prostatitis.  Does not feel that he has actual infection at this time.  Has had ongoing issues of above symptoms.  Has not been on medication for this in the past.    Not monogamous with female partners only.         Allergies   Allergen Reactions     Sulfa Drugs Anaphylaxis     Tetanus Toxoid      Other reaction(s): Wheezing         Review of Systems   Constitutional, HEENT, cardiovascular, pulmonary, GI, , musculoskeletal, neuro, skin, endocrine and psych systems are negative, except as otherwise noted.      Objective    /82   Pulse 90   Temp 98.5  F (36.9  C) (Temporal)   Resp 18   Ht 1.905 m (6' 3\")   Wt 119.7 kg (264 lb)   SpO2 97%   BMI 33.00 kg/m    Body mass index is 33 kg/m .  Physical Exam  Constitutional:       Appearance: He is well-developed.   Cardiovascular:      Rate and Rhythm: Normal rate and regular rhythm.      Heart sounds: Normal heart sounds, S1 normal and S2 normal. No murmur.   Pulmonary: " "     Effort: Pulmonary effort is normal. No respiratory distress.      Breath sounds: Normal breath sounds. No wheezing, rhonchi or rales.   Genitourinary:     Prostate: Normal. Not tender and no nodules present.   Neurological:      Mental Status: He is alert.                    Assessment & Plan     ASSESSMENT/ORDERS:    ICD-10-CM    1. Benign prostatic hyperplasia with urinary frequency  N40.1 PSA, tumor marker    R35.0 UA with Microscopic reflex to Culture ([place]; [facility])     NEISSERIA GONORRHOEA PCR     CHLAMYDIA TRACHOMATIS PCR     doxazosin (CARDURA) 2 MG tablet   2. Elevated glucose  R73.09 Comprehensive metabolic panel (BMP + Alb, Alk Phos, ALT, AST, Total. Bili, TP)     Hemoglobin A1c   3. Encounter for screening for human immunodeficiency virus (HIV)  Z11.4 HIV Antigen Antibody Combo   4. Allergy to sulfa drugs  Z88.2      PLAN:  1.  Cannot use Flomax due to sulfa anaphylaxis.  Will trial titration of doxazosin.   Start at 1 mg/night then can increase to 8 mg/night.  2.  With his sexual history, rule out of sexually transmitted diseases indicated.  He agrees and will do above labs.     BMI:   Estimated body mass index is 33 kg/m  as calculated from the following:    Height as of this encounter: 1.905 m (6' 3\").    Weight as of this encounter: 119.7 kg (264 lb).               Return in about 2 months (around 11/25/2020) for medication recheck.    Zac Sun MD  [facility]    "

## 2020-09-25 NOTE — RESULT ENCOUNTER NOTE
Dear Victor M, your recent test results are attached.    The hemoglobin A1c is 5.7 which is consistent with excellent blood sugar control.    You will be contacted with any outstanding results when they are available.  Feel free to contact me via the office or My Chart if you have any questions regarding the above.  Sincerely,  DO SHAZIA MasOI

## 2020-09-25 NOTE — RESULT ENCOUNTER NOTE
Dear Victor M, your recent test results are attached.    Your chemistry panel has returned to normal including the blood sugar and kidney function.  Liver tests are normal.      You will be contacted with any outstanding results when they are available.  Feel free to contact me via the office or My Chart if you have any questions regarding the above.  Sincerely,  DO JUAN Mas

## 2020-09-27 LAB
C TRACH DNA SPEC QL NAA+PROBE: NEGATIVE
N GONORRHOEA DNA SPEC QL NAA+PROBE: NEGATIVE
SPECIMEN SOURCE: NORMAL
SPECIMEN SOURCE: NORMAL

## 2020-09-28 LAB — HIV 1+2 AB+HIV1 P24 AG SERPL QL IA: NONREACTIVE

## 2020-09-29 NOTE — RESULT ENCOUNTER NOTE
Harshad,  Your results are normal.  Please let me know if you have any questions.    Sincerely,  Dr. Sun

## 2020-10-20 DIAGNOSIS — K21.00 GASTROESOPHAGEAL REFLUX DISEASE WITH ESOPHAGITIS: ICD-10-CM

## 2020-10-20 DIAGNOSIS — R13.19 ESOPHAGEAL DYSPHAGIA: ICD-10-CM

## 2020-10-20 DIAGNOSIS — F33.41 RECURRENT MAJOR DEPRESSIVE DISORDER, IN PARTIAL REMISSION (H): ICD-10-CM

## 2020-10-21 RX ORDER — CITALOPRAM HYDROBROMIDE 40 MG/1
TABLET ORAL
Qty: 90 TABLET | Refills: 1 | Status: SHIPPED | OUTPATIENT
Start: 2020-10-21 | End: 2021-04-05

## 2020-10-21 RX ORDER — OMEPRAZOLE 40 MG/1
CAPSULE, DELAYED RELEASE ORAL
Qty: 90 CAPSULE | Refills: 1 | Status: SHIPPED | OUTPATIENT
Start: 2020-10-21 | End: 2021-04-02

## 2020-10-21 NOTE — TELEPHONE ENCOUNTER
Prescription approved per [facility] Refill Protocol.    SHIMON GonzalesN, RN  [facility]

## 2020-11-26 ENCOUNTER — E-VISIT (OUTPATIENT)
Dept: FAMILY MEDICINE | Facility: CLINIC | Age: 50
End: 2020-11-26
Payer: COMMERCIAL

## 2020-11-26 DIAGNOSIS — R30.0 DIFFICULT OR PAINFUL URINATION: Primary | ICD-10-CM

## 2020-11-26 PROCEDURE — 99421 OL DIG E/M SVC 5-10 MIN: CPT | Performed by: FAMILY MEDICINE

## 2020-11-27 NOTE — PATIENT INSTRUCTIONS
Thank you for choosing us for your care. Given your symptoms, I would like you to do a lab-only visit to determine what is causing them.  I have placed the orders.  Please schedule an appointment with the lab right here in [facility], or call [phone].  I will let you know when the results are back and next steps to take.    Dysuria with Uncertain Cause (Adult)    The urethra is the tube that allows urine to pass out of the body. In a woman, the urethra is the opening above the vagina. In men, the urethra is the opening on the tip of the penis. Dysuria is the feeling of pain or burning in the urethra when passing urine.   Dysuria can be caused by anything that irritates or inflames the urethra. An infection or chemical irritation can cause this reaction. A bladder infection is the most common cause of dysuria in adults. A urine test can diagnose this. A bladder infection needs antibiotic treatment.   Soaps, lotions, colognes, and feminine hygiene products can cause dysuria. So can birth control jellies, creams, and foams. It will go away 1 to 3 days after using these irritants.   Sexually transmitted infections (STIs) such as chlamydia or gonorrhea can cause dysuria. Your healthcare provider may take a culture sample. Your provider may start you on antibiotic medicine before the culture test returns.   In women who have gone through menopause, dysuria can be from dryness in the lining of the urethra. This can be treated with hormones. Dysuria becomes long-term (chronic) when it lasts for weeks or months. You may need to see a specialist (urologist) to diagnose and treat chronic dysuria.   Home care  These home care tips may help:    Don't use any chemicals or products that you think may be causing your symptoms.    If you were given a prescription medicine, take as directed. Take it until it is all used up.    If a culture was taken, don't have sex until you have been told that it is negative. A negative culture  means you don't have an infection. Then follow your healthcare provider's advice to treat your condition.  If a culture was done and it is positive:     Both you and your sexual partner may need to be treated. This is true even if your partner has no symptoms.    Contact your healthcare provider or go to an urgent care clinic or the public health department to be looked at and treated.    Don't have sex until both you and your partner have finished all antibiotics and your healthcare provider says you are no longer contagious.    Learn about and use safe sex practices. The safest sex is with a partner who has tested negative and only has sex with you. Condoms can prevent STIs from spreading, but they aren't a guarantee.  Follow-up care  Follow up with your healthcare provider, or as advised. If a culture was taken, you may call as directed for the results. If you have an STI, follow up with your provider or the public health department for a complete STI screening, including HIV testing. For more information, contact CDC-INFO at 500-631-9127.   When to get medical advice  Call your healthcare provider right away if any of these occur:    You aren't better after 3 days of treatment    Fever of 100.4 F (38 C) or higher, or as directed by your provider    Back or belly pain that gets worse    You can't urinate because of pain    New discharge from the urethra, vagina, or penis    Painful sores on the penis    Rash or joint pain    Painful lumps (lymph nodes) in the groin    Testicle pain or swelling of the scrotum  [employer] last reviewed this educational content on 10/1/2019    7277-9714 [employer]. [address] [place] [address], [place] [address]. All rights reserved. This information is not intended as a substitute for professional medical care. Always follow your healthcare professional's instructions.

## 2020-12-13 DIAGNOSIS — I10 BENIGN ESSENTIAL HYPERTENSION: ICD-10-CM

## 2020-12-14 RX ORDER — LISINOPRIL AND HYDROCHLOROTHIAZIDE 12.5; 2 MG/1; MG/1
TABLET ORAL
Qty: 90 TABLET | Refills: 0 | Status: SHIPPED | OUTPATIENT
Start: 2020-12-14 | End: 2021-03-08

## 2020-12-14 NOTE — TELEPHONE ENCOUNTER
Prescription approved per [facility] Refill Protocol.    SHIMON GonzalesN, RN  [facility]

## 2020-12-18 DIAGNOSIS — R30.0 DIFFICULT OR PAINFUL URINATION: ICD-10-CM

## 2020-12-18 LAB
ALBUMIN UR-MCNC: NEGATIVE MG/DL
APPEARANCE UR: CLEAR
BILIRUB UR QL STRIP: NEGATIVE
COLOR UR AUTO: YELLOW
GLUCOSE UR STRIP-MCNC: NEGATIVE MG/DL
HGB UR QL STRIP: NEGATIVE
KETONES UR STRIP-MCNC: NEGATIVE MG/DL
LEUKOCYTE ESTERASE UR QL STRIP: NEGATIVE
NITRATE UR QL: NEGATIVE
PH UR STRIP: 6 PH (ref 5–7)
SOURCE: NORMAL
SP GR UR STRIP: 1.02 (ref 1–1.03)
UROBILINOGEN UR STRIP-ACNC: 0.2 EU/DL (ref 0.2–1)

## 2020-12-18 PROCEDURE — 81003 URINALYSIS AUTO W/O SCOPE: CPT | Performed by: FAMILY MEDICINE

## 2020-12-27 ENCOUNTER — HEALTH MAINTENANCE LETTER (OUTPATIENT)
Age: 50
End: 2020-12-27

## 2021-02-19 ENCOUNTER — OFFICE VISIT (OUTPATIENT)
Dept: INTERNAL MEDICINE | Facility: CLINIC | Age: 51
End: 2021-02-19
Payer: COMMERCIAL

## 2021-02-19 VITALS
SYSTOLIC BLOOD PRESSURE: 144 MMHG | HEART RATE: 80 BPM | DIASTOLIC BLOOD PRESSURE: 96 MMHG | HEIGHT: 76 IN | RESPIRATION RATE: 20 BRPM | TEMPERATURE: 96.8 F | WEIGHT: 260 LBS | OXYGEN SATURATION: 99 % | BODY MASS INDEX: 31.66 KG/M2

## 2021-02-19 DIAGNOSIS — N41.0 ACUTE PROSTATITIS: Primary | ICD-10-CM

## 2021-02-19 DIAGNOSIS — L64.9 MALE PATTERN BALDNESS: ICD-10-CM

## 2021-02-19 DIAGNOSIS — N40.1 BENIGN PROSTATIC HYPERPLASIA WITH URINARY FREQUENCY: ICD-10-CM

## 2021-02-19 DIAGNOSIS — R35.0 BENIGN PROSTATIC HYPERPLASIA WITH URINARY FREQUENCY: ICD-10-CM

## 2021-02-19 LAB
ALBUMIN UR-MCNC: NEGATIVE MG/DL
APPEARANCE UR: CLEAR
BILIRUB UR QL STRIP: NEGATIVE
COLOR UR AUTO: YELLOW
GLUCOSE UR STRIP-MCNC: NEGATIVE MG/DL
HGB UR QL STRIP: NEGATIVE
KETONES UR STRIP-MCNC: NEGATIVE MG/DL
LEUKOCYTE ESTERASE UR QL STRIP: NEGATIVE
NITRATE UR QL: NEGATIVE
PH UR STRIP: 6 PH (ref 5–7)
SOURCE: NORMAL
SP GR UR STRIP: 1.02 (ref 1–1.03)
UROBILINOGEN UR STRIP-MCNC: 0 MG/DL (ref 0–2)

## 2021-02-19 PROCEDURE — 81003 URINALYSIS AUTO W/O SCOPE: CPT | Performed by: INTERNAL MEDICINE

## 2021-02-19 PROCEDURE — 99213 OFFICE O/P EST LOW 20 MIN: CPT | Performed by: INTERNAL MEDICINE

## 2021-02-19 RX ORDER — CIPROFLOXACIN 500 MG/1
500 TABLET, FILM COATED ORAL 2 TIMES DAILY
Qty: 28 TABLET | Refills: 0 | Status: SHIPPED | OUTPATIENT
Start: 2021-02-19 | End: 2021-10-12

## 2021-02-19 RX ORDER — FINASTERIDE 5 MG/1
TABLET, FILM COATED ORAL
Qty: 90 TABLET | Refills: 3 | Status: SHIPPED | OUTPATIENT
Start: 2021-02-19 | End: 2022-10-18

## 2021-02-19 RX ORDER — DOXAZOSIN 4 MG/1
4 TABLET ORAL AT BEDTIME
Qty: 30 TABLET | Refills: 0 | Status: SHIPPED | OUTPATIENT
Start: 2021-02-19 | End: 2021-03-17

## 2021-02-19 ASSESSMENT — PAIN SCALES - GENERAL: PAINLEVEL: NO PAIN (0)

## 2021-02-19 ASSESSMENT — MIFFLIN-ST. JEOR: SCORE: 2137.91

## 2021-02-19 NOTE — PROGRESS NOTES
"Jordan Patricia is a 50 year old who presents for the following health issues     HPI       Chief Complaint   Patient presents with     Prostate Problem     follow up, ongoing         EMR reviewed including:             Complaint, History of Chief Complaint, Corresponding Review of Systems, and Complaint Specific Physical Examination.    #1   Retropubic perineal discomfort.  Pain with ejaculation.  No significant hematospermia.  History of recurrent prostatitis.  Symptoms present now for several months but improved with intermittent antibiotic use.  Previously tested for sexually transmitted diseases.  Not sexually active.  Discomfort with intercourse.  Has some bladder outlet obstruction.  Is on Cardura and Proscar.  Obstruction seems to be getting slightly worse.       Exam:   GENITAL: Normal male genitalia is noted. Testicles are bilaterally present. No penile lesions are present.     URINARY: Mauricio's punch is negative. No suprapubic tenderness is noted with palpation.        Vital Signs:   BP (!) 144/96 (BP Location: Right arm, Patient Position: Sitting, Cuff Size: Adult Regular)   Pulse 80   Temp 96.8  F (36  C) (Temporal)   Resp 20   Ht 1.918 m (6' 3.5\")   Wt 117.9 kg (260 lb)   SpO2 99%   BMI 32.07 kg/m               Decision Making    Problem and Complexity     1. Benign prostatic hyperplasia with urinary frequency  Continue doxazosin and Proscar.  Previous PSA was normal.  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  - finasteride (PROSCAR) 5 MG tablet; Take 1 pill daily  Dispense: 90 tablet; Refill: 3    3. Acute prostatitis  Start on Cipro.  Anticipate 2-week course and if improved, continue with secondary antibiotic for 2 weeks.  If no improvement, will set up with urology.  Check UA to rule out simple UTI concurrent  - *UA reflex to Microscopic and " Culture ([place]; [facility]; [facility]; [facility]; [facility]; [facility]; [place]; [place])  - ciprofloxacin (CIPRO) 500 MG tablet; Take 1 tablet (500 mg) by mouth 2 times daily  Dispense: 28 tablet; Refill: 0          ------------------------------------------------------------------------------------------------------------------------------  Data    4=1/3 5=2/3    1  >3  Specialty (external) notes reviewed:   No    Tests ordered (by provider) reviewed:   Previous lab work ordered by other providers have been reviewed.     Tests ordered (by provider):   Urinalysis    Independent Historians Interview:   No    2  Review of outside (other providers) Tests:   No    3   Verbal Discussion with Specialists:    None      ----------------------------------------------------------------------------------------------------------------------------------  Risk   Prescription drug management:   Yes                                High risk for toxicity:     Decision regarding surgery:    None     Social determinants of health:   No     Decision to withhold therapy:   None                                 FOLLOW UP   I have asked the patient to make an appointment for followup with me by phone in 2 weeks.            I have carefully explained the diagnosis and treatment options to the patient.  The patient has displayed an understanding of the above, and all subsequent questions were answered.      DO JUAN Mas    Portions of this note were produced using [employer]  Although every attempt at real-time proof reading has been made, occasional grammar/syntax errors may have been missed.

## 2021-03-05 ENCOUNTER — TELEPHONE (OUTPATIENT)
Dept: INTERNAL MEDICINE | Facility: CLINIC | Age: 51
End: 2021-03-05

## 2021-03-05 DIAGNOSIS — N41.0 ACUTE PROSTATITIS: Primary | ICD-10-CM

## 2021-03-05 NOTE — TELEPHONE ENCOUNTER
Follow up prostatitis. Harshad calls to report he has improved 33%. He feels he needs another round of antibiotics or maybe a different antibiotic sent to [facility] in [place].  [phone], ok to leave message.   OK to wait until 3/08/21.

## 2021-03-06 DIAGNOSIS — I10 BENIGN ESSENTIAL HYPERTENSION: ICD-10-CM

## 2021-03-08 RX ORDER — LISINOPRIL AND HYDROCHLOROTHIAZIDE 12.5; 2 MG/1; MG/1
TABLET ORAL
Qty: 90 TABLET | Refills: 0 | Status: SHIPPED | OUTPATIENT
Start: 2021-03-08 | End: 2021-05-26

## 2021-03-08 RX ORDER — CEFUROXIME AXETIL 500 MG/1
500 TABLET ORAL 2 TIMES DAILY
Qty: 28 TABLET | Refills: 0 | Status: SHIPPED | OUTPATIENT
Start: 2021-03-08 | End: 2021-10-12

## 2021-03-08 NOTE — TELEPHONE ENCOUNTER
Patient has been seen in the past 30 days, 2/19/21.Dr. Chowdhury  Routing to PCP to advise.  TORIE Mclain

## 2021-03-16 DIAGNOSIS — N40.1 BENIGN PROSTATIC HYPERPLASIA WITH URINARY FREQUENCY: ICD-10-CM

## 2021-03-16 DIAGNOSIS — R35.0 BENIGN PROSTATIC HYPERPLASIA WITH URINARY FREQUENCY: ICD-10-CM

## 2021-03-17 RX ORDER — DOXAZOSIN 4 MG/1
TABLET ORAL
Qty: 30 TABLET | Refills: 0 | Status: SHIPPED | OUTPATIENT
Start: 2021-03-17 | End: 2021-04-14

## 2021-03-17 NOTE — TELEPHONE ENCOUNTER
Patient has been seen in the past 30 days, 2/19/21, Dr. Chowdhury  Routing to PCP to advise.  TORIE Mclain

## 2021-04-01 ENCOUNTER — MYC MEDICAL ADVICE (OUTPATIENT)
Dept: INTERNAL MEDICINE | Facility: CLINIC | Age: 51
End: 2021-04-01

## 2021-04-01 DIAGNOSIS — R35.0 BENIGN PROSTATIC HYPERPLASIA WITH URINARY FREQUENCY: Primary | ICD-10-CM

## 2021-04-01 DIAGNOSIS — N40.1 BENIGN PROSTATIC HYPERPLASIA WITH URINARY FREQUENCY: Primary | ICD-10-CM

## 2021-04-01 NOTE — TELEPHONE ENCOUNTER
Referral for urology is pending if appropriate sign and route to primary pool to get scheduled.

## 2021-04-01 NOTE — TELEPHONE ENCOUNTER
Tried to reach patient, left message for patient to call the clinic back. If patient calls back, please relay message to patient and transfer them to specialty clinic to schedule with urology.    Joanne Bergman, CMA

## 2021-04-02 DIAGNOSIS — R13.19 ESOPHAGEAL DYSPHAGIA: ICD-10-CM

## 2021-04-02 DIAGNOSIS — K21.00 GASTROESOPHAGEAL REFLUX DISEASE WITH ESOPHAGITIS: ICD-10-CM

## 2021-04-02 DIAGNOSIS — F33.41 RECURRENT MAJOR DEPRESSIVE DISORDER, IN PARTIAL REMISSION (H): ICD-10-CM

## 2021-04-02 RX ORDER — OMEPRAZOLE 40 MG/1
CAPSULE, DELAYED RELEASE ORAL
Qty: 90 CAPSULE | Refills: 1 | Status: SHIPPED | OUTPATIENT
Start: 2021-04-02 | End: 2021-10-12

## 2021-04-02 NOTE — TELEPHONE ENCOUNTER
Tried to reach patient, left message for patient to call the clinic back. If patient calls back, please relay message to patient and transfer them to specialty clinic to schedule with urology.     MyChart message sent to patient.      Joanne Bergman CMA

## 2021-04-02 NOTE — TELEPHONE ENCOUNTER
Celexa  Routing refill request to provider for review/approval because:  PHQ 9 score not current    Genet Govea RN

## 2021-04-05 RX ORDER — CITALOPRAM HYDROBROMIDE 40 MG/1
TABLET ORAL
Qty: 90 TABLET | Refills: 1 | Status: SHIPPED | OUTPATIENT
Start: 2021-04-05 | End: 2021-10-12

## 2021-05-11 DIAGNOSIS — N40.1 BENIGN PROSTATIC HYPERPLASIA WITH URINARY FREQUENCY: ICD-10-CM

## 2021-05-11 DIAGNOSIS — R35.0 BENIGN PROSTATIC HYPERPLASIA WITH URINARY FREQUENCY: ICD-10-CM

## 2021-05-11 RX ORDER — DOXAZOSIN 4 MG/1
TABLET ORAL
Qty: 30 TABLET | Refills: 0 | Status: SHIPPED | OUTPATIENT
Start: 2021-05-11 | End: 2021-06-08

## 2021-05-11 NOTE — TELEPHONE ENCOUNTER
"Routing refill request to provider for review/approval because:  BP failed protocol  T'd up 1 month for provider review.      Requested Prescriptions   Pending Prescriptions Disp Refills     doxazosin (CARDURA) 4 MG tablet [Pharmacy Med Name: DOXAZOSIN MESYLATE 4MG TABS] 30 tablet 0     Sig: TAKE ONE TABLET BY MOUTH EVERY EVENING AT BEDTIME   Last Written Prescription Date:  4/14/2021  Last Fill Quantity: 30,  # refills: 0   Last office visit: 2/19/2021 with prescribing provider:     Future Office Visit:        Alpha Blockers Failed - 5/11/2021  1:01 AM        Failed - Blood pressure under 140/90 in past 12 months     BP Readings from Last 3 Encounters:   02/19/21 (!) 144/96   09/25/20 138/82   05/10/20 (!) 160/108           Failed - Patient does not have Tadalafil, Vardenafil, or Sildenafil on their medication list        Passed - Recent (12 mo) or future (30 days) visit within the authorizing provider's specialty     Patient has had an office visit with the authorizing provider or a provider within the authorizing providers department within the previous 12 mos or has a future within next 30 days. See \"Patient Info\" tab in inbasket, or \"Choose Columns\" in Meds & Orders section of the refill encounter.            Passed - Medication is active on med list        Passed - Patient is 18 years of age or older       Antiadrenergic Antihypertensives Failed - 5/11/2021  1:01 AM        Failed - Blood pressure less than 140/90 in past 6 months     BP Readings from Last 3 Encounters:   02/19/21 (!) 144/96   09/25/20 138/82   05/10/20 (!) 160/108           Passed - Medication is active on med list        Passed - Patient is age 18 or older        Passed - Normal serum creatinine on file in past 12 months     Recent Labs   Lab Test 09/25/20  1242   CR 1.11       Ok to refill medication if creatinine is low          Passed - Recent (6 mo) or future (30 days) visit within the authorizing provider's specialty     Patient had " "office visit in the last 6 months or has a visit in the next 30 days with authorizing provider or within the authorizing provider's specialty.  See \"Patient Info\" tab in inbasket, or \"Choose Columns\" in Meds & Orders section of the refill encounter.             Vianey Piedra RN      "

## 2021-06-06 DIAGNOSIS — R35.0 BENIGN PROSTATIC HYPERPLASIA WITH URINARY FREQUENCY: ICD-10-CM

## 2021-06-06 DIAGNOSIS — N40.1 BENIGN PROSTATIC HYPERPLASIA WITH URINARY FREQUENCY: ICD-10-CM

## 2021-06-08 RX ORDER — DOXAZOSIN 4 MG/1
TABLET ORAL
Qty: 90 TABLET | Refills: 0 | Status: SHIPPED | OUTPATIENT
Start: 2021-06-08 | End: 2021-06-16

## 2021-06-08 NOTE — TELEPHONE ENCOUNTER
Routing refill request to provider for review/approval because:  Labs out of range:  BP  Patient has Tadalafil, Vardenafil, or Sildenafil on their medication list    SHIMON GonzalesN, RN  [facility]

## 2021-06-16 ENCOUNTER — OFFICE VISIT (OUTPATIENT)
Dept: UROLOGY | Facility: CLINIC | Age: 51
End: 2021-06-16
Payer: COMMERCIAL

## 2021-06-16 VITALS
TEMPERATURE: 98 F | BODY MASS INDEX: 31.66 KG/M2 | SYSTOLIC BLOOD PRESSURE: 132 MMHG | HEIGHT: 76 IN | WEIGHT: 260 LBS | DIASTOLIC BLOOD PRESSURE: 82 MMHG

## 2021-06-16 DIAGNOSIS — N53.14 RETROGRADE EJACULATION: ICD-10-CM

## 2021-06-16 DIAGNOSIS — N40.1 BENIGN PROSTATIC HYPERPLASIA WITH URINARY FREQUENCY: Primary | ICD-10-CM

## 2021-06-16 DIAGNOSIS — R35.0 BENIGN PROSTATIC HYPERPLASIA WITH URINARY FREQUENCY: Primary | ICD-10-CM

## 2021-06-16 LAB
ALBUMIN UR-MCNC: NEGATIVE MG/DL
APPEARANCE UR: CLEAR
BILIRUB UR QL STRIP: NEGATIVE
COLOR UR AUTO: YELLOW
GLUCOSE UR STRIP-MCNC: NEGATIVE MG/DL
HGB UR QL STRIP: NEGATIVE
KETONES UR STRIP-MCNC: NEGATIVE MG/DL
LEUKOCYTE ESTERASE UR QL STRIP: NEGATIVE
NITRATE UR QL: NEGATIVE
PH UR STRIP: 5 PH (ref 5–7)
SOURCE: NORMAL
SP GR UR STRIP: 1.01 (ref 1–1.03)
UROBILINOGEN UR STRIP-MCNC: 0 MG/DL (ref 0–2)

## 2021-06-16 PROCEDURE — 51798 US URINE CAPACITY MEASURE: CPT | Performed by: UROLOGY

## 2021-06-16 PROCEDURE — 81003 URINALYSIS AUTO W/O SCOPE: CPT | Mod: 59 | Performed by: UROLOGY

## 2021-06-16 PROCEDURE — 99204 OFFICE O/P NEW MOD 45 MIN: CPT | Mod: 25 | Performed by: UROLOGY

## 2021-06-16 RX ORDER — MIRABEGRON 50 MG/1
50 TABLET, EXTENDED RELEASE ORAL DAILY
Qty: 30 TABLET | Refills: 1 | Status: SHIPPED | OUTPATIENT
Start: 2021-06-16

## 2021-06-16 RX ORDER — ALFUZOSIN HYDROCHLORIDE 10 MG/1
10 TABLET, EXTENDED RELEASE ORAL DAILY
Qty: 90 TABLET | Refills: 3 | Status: SHIPPED | OUTPATIENT
Start: 2021-06-16

## 2021-06-16 ASSESSMENT — MIFFLIN-ST. JEOR: SCORE: 2137.91

## 2021-06-16 NOTE — PROGRESS NOTES
Bladder Scan performed. 25 mL maximum residual urine detected after 3 scans. MD informed.  Chelsie Zuniga RN, BSN [facility]

## 2021-06-16 NOTE — LETTER
June 16, 2021      RE: Victor M Mao  [address]  [place] [facility] [id number]       To whom it may concern:    Victor M Mao was seen in our clinic today. He has been out of work since 6/14/2021 and may return to work with the following: No restrictions on or about 6/21/2021.    Sincerely,      Dre Deal MD

## 2021-06-16 NOTE — PROGRESS NOTES
S: Victor M Mao is a pleasant  50 year old male who was requested to be seen by  Lee Chowdhury for a consult with regard to patient's urinary complaints.  Patient complains of Strain to Urinate, Nocturia x 2, Frequency and slower urinary stream.  He has no history of elevated PSA.  Symptoms have been on going for   1 years(s).  Seems to be stable over time.  His recent PSA was found to be   PSA   Date Value Ref Range Status   09/25/2020 0.47 0 - 4 ug/L Final     Comment:     Assay Method:  Chemiluminescence using Siemens Vista analyzer   .  His AUA Symptom Score:  16.  His QOL score:  5.  He was placed on antibiotics for possible prostatitis without much urinary improvements.  He is on a low-dose of finasteride.  He was placed on doxazosin 6 months ago.  His main concern right now is decreased ejaculation with less sensation.  He has no dysuria or hematuria.  His urinalysis have been unremarkable.    Current Outpatient Medications   Medication Sig Dispense Refill     alfuzosin ER (UROXATRAL) 10 MG 24 hr tablet Take 1 tablet (10 mg) by mouth daily 90 tablet 3     cefuroxime (CEFTIN) 500 MG tablet Take 1 tablet (500 mg) by mouth 2 times daily 28 tablet 0     citalopram (CELEXA) 40 MG tablet TAKE ONE TABLET BY MOUTH ONCE DAILY 90 tablet 1     finasteride (PROSCAR) 5 MG tablet Take 1 pill daily 90 tablet 3     fluticasone (FLONASE) 50 MCG/ACT nasal spray Spray 1 spray into both nostrils daily 16 mL 3     lisinopril-hydrochlorothiazide (ZESTORETIC) 20-12.5 MG tablet TAKE ONE TABLET BY MOUTH ONCE DAILY 90 tablet 1     LORazepam (ATIVAN) 0.5 MG tablet Take 2 tablets (1 mg) by mouth every 8 hours as needed for anxiety 60 tablet 0     mirabegron (MYRBETRIQ) 50 MG 24 hr tablet Take 1 tablet (50 mg) by mouth daily 30 tablet 1     omeprazole (PRILOSEC) 40 MG DR capsule TAKE ONE CAPSULE BY MOUTH ONCE DAILY 90 capsule 1     sildenafil (VIAGRA) 100 MG tablet Take 1 tablet (100 mg) by mouth daily as needed (Erectile  dysfunction) 10 tablet 3     ciprofloxacin (CIPRO) 500 MG tablet Take 1 tablet (500 mg) by mouth 2 times daily (Patient not taking: Reported on 6/16/2021) 28 tablet 0     Allergies   Allergen Reactions     Sulfa Drugs Anaphylaxis     Tetanus Toxoid      Other reaction(s): Wheezing     Past Medical History:   Diagnosis Date     Hypertension 2013     Sleep apnea     has cpap but does not use     Past Surgical History:   Procedure Laterality Date     ESOPHAGOSCOPY, GASTROSCOPY, DUODENOSCOPY (EGD), COMBINED N/A 10/4/2019    Procedure: Esophagogastroduodenoscopy with Biopsy by Biopsy;  Surgeon: Charbel Jordan DO;  Location: [facility]     LAPAROSCOPIC HERNIORRHAPHY UMBILICAL N/A 11/14/2019    Procedure: Laparoscopic Umbilical Herniorrhaphy with Mesh;  Surgeon: Charbel Jordan DO;  Location: [facility] OR      No family history on file.  He does not have a family history of prostate cancer.  Social History     Socioeconomic History     Marital status: Single     Spouse name: None     Number of children: None     Years of education: None     Highest education level: None   Occupational History     None   Social Needs     Financial resource strain: None     Food insecurity     Worry: None     Inability: None     Transportation needs     Medical: None     Non-medical: None   Tobacco Use     Smoking status: Never Smoker     Smokeless tobacco: Never Used   Substance and Sexual Activity     Alcohol use: Yes     Comment: occasional 2 times week     Drug use: No     Sexual activity: Yes     Partners: Female   Lifestyle     Physical activity     Days per week: None     Minutes per session: None     Stress: None   Relationships     Social connections     Talks on phone: None     Gets together: None     Attends [employer] service: None     Active member of club or organization: None     Attends meetings of clubs or organizations: None     Relationship status: None     Intimate partner violence     Fear of current or ex partner:  "None     Emotionally abused: None     Physically abused: None     Forced sexual activity: None   Other Topics Concern     Parent/sibling w/ CABG, MI or angioplasty before 65F 55M? Not Asked   Social History Narrative     None        REVIEW OF SYSTEMS  =================  C: NEGATIVE for fever, chills, change in weight  I: NEGATIVE for worrisome rashes, moles or lesions  E/M: NEGATIVE for ear, mouth and throat problems  R: NEGATIVE for significant cough or SHORTNESS OF BREATH  CV:  NEGATIVE for chest pain, palpitations or peripheral edema  GI: NEGATIVE for nausea, abdominal pain, heartburn, or change in bowel habits  NEURO: NEGATIVE numbness/weakness  : see HPI  PSYCH: NEGATIVE depression/anxiety  LYmph: no new enlarged lymph nodes  Ortho: no new trauma/movements           O: Exam:/82   Temp 98  F (36.7  C)   Ht 1.918 m (6' 3.5\")   Wt 117.9 kg (260 lb)   BMI 32.07 kg/m     Constitutional: healthy, alert and no distress  Cardiovascular: negative, PMI normal.   Respiratory: negative, no evidence of respiratory distress  Gastrointestinal: Abdomen soft, non-tender. BS normal. No masses, organomegaly  : penis no discharge.  Testis no masses.  No scrotal skin lesion.  Prostate apex only.  Bladder scan with minimal residual urine.  Musculoskeletal: extremities normal- no gross deformities noted, gait normal and normal muscle tone  Skin: no suspicious lesions or rashes  Neurologic: Alert and oriented  Psychiatric: mentation appears normal. and affect normal/bright  Hematologic/Lymphatic/Immunologic: normal ant/post cervical, axillary, supraclavicular and inguinal nodes    Assessment/Plan:   (N40.1,  R35.0) Benign prostatic hyperplasia with urinary frequency  (primary encounter diagnosis)  Comment: Main concern retrograde ejaculation.  This is due to doxazosin.  Plan: We will switch patient to alfuzosin.          Add Myrbetriq for urinary frequency.          Recheck with me in several weeks.    (N53.14) " Retrograde ejaculation  Comment:    Plan: See above

## 2021-07-05 DIAGNOSIS — J30.2 SEASONAL ALLERGIC RHINITIS, UNSPECIFIED TRIGGER: ICD-10-CM

## 2021-07-05 RX ORDER — FLUTICASONE PROPIONATE 50 MCG
SPRAY, SUSPENSION (ML) NASAL
Qty: 16 G | Refills: 5 | Status: SHIPPED | OUTPATIENT
Start: 2021-07-05 | End: 2021-10-12

## 2021-07-13 DIAGNOSIS — N52.9 ERECTILE DYSFUNCTION, UNSPECIFIED ERECTILE DYSFUNCTION TYPE: ICD-10-CM

## 2021-07-15 RX ORDER — SILDENAFIL 100 MG/1
TABLET, FILM COATED ORAL
Qty: 10 TABLET | Refills: 3 | Status: SHIPPED | OUTPATIENT
Start: 2021-07-15 | End: 2021-10-12

## 2021-07-15 NOTE — TELEPHONE ENCOUNTER
Routing refill request to provider for review/approval because:  Alpha blockers on medication list.     Genet Govea Rn

## 2021-08-08 ENCOUNTER — E-VISIT (OUTPATIENT)
Dept: INTERNAL MEDICINE | Facility: CLINIC | Age: 51
End: 2021-08-08
Payer: COMMERCIAL

## 2021-08-08 DIAGNOSIS — Z53.9 ERRONEOUS ENCOUNTER--DISREGARD: Primary | ICD-10-CM

## 2021-08-08 DIAGNOSIS — Z53.9 NO SHOW: ICD-10-CM

## 2021-08-12 ENCOUNTER — TELEPHONE (OUTPATIENT)
Dept: INTERNAL MEDICINE | Facility: CLINIC | Age: 51
End: 2021-08-12

## 2021-08-12 NOTE — TELEPHONE ENCOUNTER
Left message for patient to call clinic back. Per Dr. Chowdhury patient needs to schedule a f2f visit. Patient was scheduled an e visit but needs to be f2f. Dr. Chowdhury's message below.     Maryam Valerio MA 8/12/2021  9:47 AM            I last saw the patient in February.   We will need to set up a face to face visit.   Calling over muscle relaxers without a proper diagnosis is not good medicine.   [place]

## 2021-08-13 NOTE — TELEPHONE ENCOUNTER
I have attempted to call the pt with the following message. I left a message for pt to call back. I have also sent a Walk-in message to the pt. Gaby Victoria CMA

## 2021-09-19 ENCOUNTER — NURSE TRIAGE (OUTPATIENT)
Dept: NURSING | Facility: CLINIC | Age: 51
End: 2021-09-19

## 2021-09-20 ENCOUNTER — HOSPITAL ENCOUNTER (EMERGENCY)
Facility: CLINIC | Age: 51
Discharge: HOME | End: 2021-09-20
Attending: FAMILY MEDICINE | Admitting: FAMILY MEDICINE
Payer: COMMERCIAL

## 2021-09-20 VITALS
DIASTOLIC BLOOD PRESSURE: 72 MMHG | WEIGHT: 266.8 LBS | SYSTOLIC BLOOD PRESSURE: 148 MMHG | RESPIRATION RATE: 18 BRPM | BODY MASS INDEX: 32.91 KG/M2 | OXYGEN SATURATION: 97 % | HEART RATE: 70 BPM | TEMPERATURE: 98.3 F

## 2021-09-20 DIAGNOSIS — R53.83 FATIGUE, UNSPECIFIED TYPE: ICD-10-CM

## 2021-09-20 DIAGNOSIS — E87.1 HYPONATREMIA: ICD-10-CM

## 2021-09-20 DIAGNOSIS — E11.9 DIABETES MELLITUS, NEW ONSET (H): ICD-10-CM

## 2021-09-20 LAB
ALBUMIN SERPL-MCNC: 3.8 G/DL (ref 3.4–5)
ALBUMIN UR-MCNC: NEGATIVE MG/DL
ALP SERPL-CCNC: 81 U/L (ref 40–150)
ALT SERPL W P-5'-P-CCNC: 69 U/L (ref 0–70)
ANION GAP SERPL CALCULATED.3IONS-SCNC: 12 MMOL/L (ref 3–14)
APPEARANCE UR: CLEAR
AST SERPL W P-5'-P-CCNC: 41 U/L (ref 0–45)
BASOPHILS # BLD AUTO: 0.1 10E3/UL (ref 0–0.2)
BASOPHILS NFR BLD AUTO: 1 %
BILIRUB SERPL-MCNC: 0.7 MG/DL (ref 0.2–1.3)
BILIRUB UR QL STRIP: NEGATIVE
BUN SERPL-MCNC: 23 MG/DL (ref 7–30)
CALCIUM SERPL-MCNC: 8.8 MG/DL (ref 8.5–10.1)
CHLORIDE BLD-SCNC: 92 MMOL/L (ref 94–109)
CO2 SERPL-SCNC: 19 MMOL/L (ref 20–32)
COLOR UR AUTO: ABNORMAL
CREAT SERPL-MCNC: 0.94 MG/DL (ref 0.66–1.25)
DEPRECATED S PYO AG THROAT QL EIA: NEGATIVE
EOSINOPHIL # BLD AUTO: 0.2 10E3/UL (ref 0–0.7)
EOSINOPHIL NFR BLD AUTO: 2 %
ERYTHROCYTE [DISTWIDTH] IN BLOOD BY AUTOMATED COUNT: 12.1 % (ref 10–15)
GFR SERPL CREATININE-BSD FRML MDRD: >90 ML/MIN/1.73M2
GLUCOSE BLD-MCNC: 523 MG/DL (ref 70–99)
GLUCOSE BLDC GLUCOMTR-MCNC: 386 MG/DL (ref 70–99)
GLUCOSE UR STRIP-MCNC: >499 MG/DL
GROUP A STREP BY PCR: NOT DETECTED
HBA1C MFR BLD: 11.4 % (ref 0–5.6)
HCT VFR BLD AUTO: 42.7 % (ref 40–53)
HGB BLD-MCNC: 16 G/DL (ref 13.3–17.7)
HGB UR QL STRIP: NEGATIVE
IMM GRANULOCYTES # BLD: 0 10E3/UL
IMM GRANULOCYTES NFR BLD: 0 %
KETONES UR STRIP-MCNC: 20 MG/DL
LEUKOCYTE ESTERASE UR QL STRIP: NEGATIVE
LYMPHOCYTES # BLD AUTO: 2.5 10E3/UL (ref 0.8–5.3)
LYMPHOCYTES NFR BLD AUTO: 38 %
MCH RBC QN AUTO: 31.2 PG (ref 26.5–33)
MCHC RBC AUTO-ENTMCNC: 37.5 G/DL (ref 31.5–36.5)
MCV RBC AUTO: 83 FL (ref 78–100)
MONOCYTES # BLD AUTO: 0.4 10E3/UL (ref 0–1.3)
MONOCYTES NFR BLD AUTO: 6 %
NEUTROPHILS # BLD AUTO: 3.6 10E3/UL (ref 1.6–8.3)
NEUTROPHILS NFR BLD AUTO: 53 %
NITRATE UR QL: NEGATIVE
NRBC # BLD AUTO: 0 10E3/UL
NRBC BLD AUTO-RTO: 0 /100
PH UR STRIP: 6 [PH] (ref 5–7)
PLATELET # BLD AUTO: 162 10E3/UL (ref 150–450)
POTASSIUM BLD-SCNC: 4.4 MMOL/L (ref 3.4–5.3)
PROT SERPL-MCNC: 8.5 G/DL (ref 6.8–8.8)
RBC # BLD AUTO: 5.13 10E6/UL (ref 4.4–5.9)
RBC URINE: <1 /HPF
SARS-COV-2 RNA RESP QL NAA+PROBE: NEGATIVE
SODIUM SERPL-SCNC: 123 MMOL/L (ref 133–144)
SP GR UR STRIP: 1.03 (ref 1–1.03)
TSH SERPL DL<=0.005 MIU/L-ACNC: 2.18 MU/L (ref 0.4–4)
UROBILINOGEN UR STRIP-MCNC: NORMAL MG/DL
WBC # BLD AUTO: 6.7 10E3/UL (ref 4–11)
WBC URINE: <1 /HPF

## 2021-09-20 PROCEDURE — 84443 ASSAY THYROID STIM HORMONE: CPT | Performed by: FAMILY MEDICINE

## 2021-09-20 PROCEDURE — 36415 COLL VENOUS BLD VENIPUNCTURE: CPT | Performed by: FAMILY MEDICINE

## 2021-09-20 PROCEDURE — C9803 HOPD COVID-19 SPEC COLLECT: HCPCS | Performed by: FAMILY MEDICINE

## 2021-09-20 PROCEDURE — 85025 COMPLETE CBC W/AUTO DIFF WBC: CPT | Performed by: FAMILY MEDICINE

## 2021-09-20 PROCEDURE — 99284 EMERGENCY DEPT VISIT MOD MDM: CPT | Mod: 25 | Performed by: FAMILY MEDICINE

## 2021-09-20 PROCEDURE — 96372 THER/PROPH/DIAG INJ SC/IM: CPT | Mod: XS | Performed by: FAMILY MEDICINE

## 2021-09-20 PROCEDURE — 87651 STREP A DNA AMP PROBE: CPT | Performed by: FAMILY MEDICINE

## 2021-09-20 PROCEDURE — 99284 EMERGENCY DEPT VISIT MOD MDM: CPT | Performed by: FAMILY MEDICINE

## 2021-09-20 PROCEDURE — 250N000012 HC RX MED GY IP 250 OP 636 PS 637: Performed by: FAMILY MEDICINE

## 2021-09-20 PROCEDURE — 96360 HYDRATION IV INFUSION INIT: CPT | Performed by: FAMILY MEDICINE

## 2021-09-20 PROCEDURE — 82040 ASSAY OF SERUM ALBUMIN: CPT | Performed by: FAMILY MEDICINE

## 2021-09-20 PROCEDURE — 83036 HEMOGLOBIN GLYCOSYLATED A1C: CPT | Performed by: FAMILY MEDICINE

## 2021-09-20 PROCEDURE — 87635 SARS-COV-2 COVID-19 AMP PRB: CPT | Performed by: FAMILY MEDICINE

## 2021-09-20 PROCEDURE — 81001 URINALYSIS AUTO W/SCOPE: CPT | Performed by: FAMILY MEDICINE

## 2021-09-20 PROCEDURE — 258N000003 HC RX IP 258 OP 636: Performed by: FAMILY MEDICINE

## 2021-09-20 RX ORDER — NICOTINE POLACRILEX 4 MG
15-30 LOZENGE BUCCAL
Status: DISCONTINUED | OUTPATIENT
Start: 2021-09-20 | End: 2021-09-20 | Stop reason: HOSPADM

## 2021-09-20 RX ORDER — IPRATROPIUM BROMIDE AND ALBUTEROL SULFATE 2.5; .5 MG/3ML; MG/3ML
3 SOLUTION RESPIRATORY (INHALATION) ONCE
Status: DISCONTINUED | OUTPATIENT
Start: 2021-09-20 | End: 2021-09-20

## 2021-09-20 RX ORDER — DEXTROSE MONOHYDRATE 25 G/50ML
25-50 INJECTION, SOLUTION INTRAVENOUS
Status: DISCONTINUED | OUTPATIENT
Start: 2021-09-20 | End: 2021-09-20 | Stop reason: HOSPADM

## 2021-09-20 RX ADMIN — INSULIN GLARGINE 30 UNITS: 100 INJECTION, SOLUTION SUBCUTANEOUS at 15:51

## 2021-09-20 RX ADMIN — INSULIN HUMAN 12 UNITS: 100 INJECTION, SOLUTION PARENTERAL at 14:14

## 2021-09-20 RX ADMIN — SODIUM CHLORIDE 1000 ML: 9 INJECTION, SOLUTION INTRAVENOUS at 13:47

## 2021-09-20 NOTE — ED TRIAGE NOTES
Pt presents with extreme fatigue and thirst. Throat pain. Chills and body aches. Pt states fatigue for 3 weeks.

## 2021-09-20 NOTE — TELEPHONE ENCOUNTER
"Patient reports he has been feeling \"crappy\" the last couple of weeks on and off. Symptoms include fatigue, sore throat, body aches, chills, heavy thirst, headache, shortness of breath (chalino with exertion- feels winded easier), \"little bit\" of chest pressure. Patient states he slept 24 hours straight, but still feel very fatigued. Reports he was vaccinated for Covid in January 2021. States no loss of taste, but things are tasting \"weird\"    Per RN triage guidelines patient was advised to be evaluated in the ER. Patient states he will go to the ER tomorrow, declined going into the ER tonight.     Estefani Reyes RN/[facility] Nurse Advisors          COVID 19 Nurse Triage Plan/Patient Instructions    Please be aware that novel coronavirus (COVID-19) may be circulating in the community. If you develop symptoms such as fever, cough, or SOB or if you have concerns about the presence of another infection including coronavirus (COVID-19), please contact your health care provider or visit [email or web address].     Disposition/Instructions    ED Visit recommended. Follow protocol based instructions.     Bring Your Own Device:  Please also bring your smart device(s) (smart phones, tablets, laptops) and their charging cables for your personal use and to communicate with your care team during your visit.    Thank you for taking steps to prevent the spread of this virus.  o Limit your contact with others.  o Wear a simple mask to cover your cough.  o Wash your hands well and often.    Resources    [facility]: About COVID-19: [email or web address]    CDC: What to Do If You're Sick: www.cdc.gov/coronavirus/2019-ncov/about/steps-when-sick.html    CDC: Ending Home Isolation: www.cdc.gov/coronavirus/2019-ncov/hcp/disposition-in-home-patients.html     CDC: Caring for Someone: www.cdc.gov/coronavirus/2019-ncov/if-you-are-sick/care-for-someone.html     [facility]: Interim Guidance for Hospital Discharge to " Home: www.health.[facility].[place]/diseases/coronavirus/hcp/hospdischarge.pdf    [facility] clinical trials (COVID-19 research studies): [email or web address]     Below are the COVID-19 hotlines at the [place] Department of Health ([facility]). Interpreters are available.   o For health questions: Call 481-212-5459 or 1-161.205.9608 (7 a.m. to 7 p.m.)  o For questions about schools and childcare: Call 477-428-9453 or 1-670.435.8395 (7 a.m. to 7 p.m.)       Reason for Disposition    SEVERE or constant chest pain or pressure (Exception: mild central chest pain, present only when coughing)    Additional Information    Negative: SEVERE difficulty breathing (e.g., struggling for each breath, speaks in single words)    Negative: Difficult to awaken or acting confused (e.g., disoriented, slurred speech)    Negative: Bluish (or gray) lips or face now    Negative: Shock suspected (e.g., cold/pale/clammy skin, too weak to stand, low BP, rapid pulse)    Negative: Sounds like a life-threatening emergency to the triager    Protocols used: CORONAVIRUS (COVID-19) DIAGNOSED OR [id number]-RW 3.25

## 2021-09-20 NOTE — DISCHARGE INSTRUCTIONS
Begin Lantus 30 units daily.  Administer your next dose tomorrow morning.  Check your blood sugars up to 4 times a day and as needed if you have signs of hypoglycemia.  Administer a sliding scale short acting insulin as follows.  Give 2 units subcutaneous if glucose is >150-199   Give 4 units if glucose 200-249   Give 6 units if glucose 250-299   Give 8 units if glucose 300-349   Give 10 units glucose    Give 12 units glucose 400-449   Give 14 units glucose    Call if glucose > 500  Follow-up with Dr. Jesus Will next week for endocrine consultation.  Office number is [phone].  His office will contact you for an appointment later today.  Watch for signs of hypoglycemia.  See the attached handouts.

## 2021-09-20 NOTE — ED PROVIDER NOTES
[facility] ED Provider Note   Patient: Victor M Mao  MRN #:  [id number]  Date of Visit: September 20, 2021    CC:     Chief Complaint   Patient presents with     Fatigue     Throat pain,  chills body aches.      HPI:  Victor M Mao is a 51 year old male who presented to the emergency department with 3-week history of fatigue, sore throat, chills, body aches, loss of appetite, and change in taste of food.  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.  He had his Covid vaccination in January of this year.  Patient has had prolonged symptoms for the last 3 weeks, but this weekend he was pretty much laid up in bed due to the fatigue.  He has an ongoing sore throat, occasional intermittent cough, no wheezes or shortness of breath, no vomiting or diarrhea.  He reports that the foods he ordinarily likes taste differently.  Patient has been constantly thirsty, and voiding frequently.  He had a previous elevated blood sugar, that was followed up fasting and it was fine.  He has borderline hypertension.  Cholesterol levels are fine.  He does not smoke.  He has approximately five drinks per week, but not recently.    Problem List:  Patient Active Problem List    Diagnosis Date Noted     Benign prostatic hyperplasia with urinary frequency 09/25/2020     Priority: Medium     Umbilical hernia without obstruction and without gangrene 09/06/2019     Priority: Medium     Anxiety 10/30/2017     Priority: Medium     Benign essential hypertension 10/30/2017     Priority: Medium       Past Medical History:   Diagnosis Date     Hypertension 2013     Sleep apnea        MEDS: alfuzosin ER (UROXATRAL) 10 MG 24 hr tablet  blood glucose (NO BRAND SPECIFIED) lancets standard  blood glucose (NO BRAND SPECIFIED) test strip  blood glucose monitoring (NO BRAND SPECIFIED) meter device kit  citalopram (CELEXA) 40 MG tablet  finasteride (PROSCAR) 5 MG  tablet  fluticasone (FLONASE) 50 MCG/ACT nasal spray  insulin aspart (NOVOLOG PEN) 100 UNIT/ML pen  insulin glargine (LANTUS PEN) 100 UNIT/ML pen  insulin pen needle (31G X 5 MM) 31G X 5 MM miscellaneous  lisinopril-hydrochlorothiazide (ZESTORETIC) 20-12.5 MG tablet  LORazepam (ATIVAN) 0.5 MG tablet  mirabegron (MYRBETRIQ) 50 MG 24 hr tablet  omeprazole (PRILOSEC) 40 MG DR capsule  cefuroxime (CEFTIN) 500 MG tablet  ciprofloxacin (CIPRO) 500 MG tablet  sildenafil (VIAGRA) 100 MG tablet        ALLERGIES:    Allergies   Allergen Reactions     Sulfa Drugs Anaphylaxis     Tetanus Toxoid      Other reaction(s): Wheezing       Past Surgical History:   Procedure Laterality Date     ESOPHAGOSCOPY, GASTROSCOPY, DUODENOSCOPY (EGD), COMBINED N/A 10/4/2019    Procedure: Esophagogastroduodenoscopy with Biopsy by Biopsy;  Surgeon: Charbel Jordan DO;  Location:  GI     LAPAROSCOPIC HERNIORRHAPHY UMBILICAL N/A 11/14/2019    Procedure: Laparoscopic Umbilical Herniorrhaphy with Mesh;  Surgeon: Charbel Jordan DO;  Location: [facility] OR       Social History     Tobacco Use     Smoking status: Never Smoker     Smokeless tobacco: Never Used   Substance Use Topics     Alcohol use: Yes     Comment: occasional 2 times week     Drug use: No         Review of Systems   Except as noted in HPI, all other systems were reviewed and are negative    Physical Exam     Vitals were reviewed  Patient Vitals for the past 12 hrs:   BP Temp Temp src Pulse Resp SpO2 Weight   09/20/21 1636 (!) 148/72 -- -- 70 18 97 % --   09/20/21 1400 (!) 149/102 -- -- 91 -- -- --   09/20/21 1345 -- -- -- -- -- 97 % --   09/20/21 1330 (!) 130/102 -- -- 97 -- 93 % --   09/20/21 1230 (!) 161/108 -- -- 87 -- 98 % --   09/20/21 1159 (!) 125/109 98.3  F (36.8  C) Oral 106 20 99 % 121 kg (266 lb 12.8 oz)     GENERAL APPEARANCE: Alert and oriented x3, no apparent distress  FACE: normal facies  EYES: Pupils are equal  HENT: normal external exam: Oropharynx is  slightly injected over the soft palate.  No tonsillar enlargement or exudates  NECK: no adenopathy or asymmetry  RESP: normal respiratory effort; clear breath sounds bilaterally  CV: regular rate and rhythm; no significant murmurs, gallops or rubs  ABD: soft, no tenderness; no rebound or guarding; bowel sounds are normal  MS: no gross deformities noted; normal muscle tone.  EXT: No calf tenderness or pitting edema  SKIN: no worrisome rash  NEURO: no facial droop; no focal deficits, speech is normal  PSYCH: normal mood and affect      Available Lab/Imaging Results     Results for orders placed or performed during the hospital encounter of 09/20/21 (from the past 24 hour(s))   UA with Microscopic reflex to Culture    Specimen: Urine, Midstream   Result Value Ref Range    Color Urine Straw Colorless, Straw, Light Yellow, Yellow    Appearance Urine Clear Clear    Glucose Urine >499 (A) Negative mg/dL    Bilirubin Urine Negative Negative    Ketones Urine 20  (A) Negative mg/dL    Specific Gravity Urine 1.026 1.003 - 1.035    Blood Urine Negative Negative    pH Urine 6.0 5.0 - 7.0    Protein Albumin Urine Negative Negative mg/dL    Urobilinogen Urine Normal Normal, 2.0 mg/dL    Nitrite Urine Negative Negative    Leukocyte Esterase Urine Negative Negative    RBC Urine <1 <=2 /HPF    WBC Urine <1 <=5 /HPF    Narrative    Urine Culture not indicated   CBC with platelets differential    Narrative    The following orders were created for panel order CBC with platelets differential.  Procedure                               Abnormality         Status                     ---------                               -----------         ------                     CBC with platelets and d...[[id number]]  Abnormal            Final result                 Please view results for these tests on the individual orders.   Comprehensive metabolic panel   Result Value Ref Range    Sodium 123 (L) 133 - 144 mmol/L    Potassium 4.4 3.4 - 5.3 mmol/L     Chloride 92 (L) 94 - 109 mmol/L    Carbon Dioxide (CO2) 19 (L) 20 - 32 mmol/L    Anion Gap 12 3 - 14 mmol/L    Urea Nitrogen 23 7 - 30 mg/dL    Creatinine 0.94 0.66 - 1.25 mg/dL    Calcium 8.8 8.5 - 10.1 mg/dL    Glucose 523 (HH) 70 - 99 mg/dL    Alkaline Phosphatase 81 40 - 150 U/L    AST 41 0 - 45 U/L    ALT 69 0 - 70 U/L    Protein Total 8.5 6.8 - 8.8 g/dL    Albumin 3.8 3.4 - 5.0 g/dL    Bilirubin Total 0.7 0.2 - 1.3 mg/dL    GFR Estimate >90 >60 mL/min/1.73m2   Hemoglobin A1c   Result Value Ref Range    Hemoglobin A1C 11.4 (H) 0.0 - 5.6 %   Streptococcus A Rapid Scr w Reflx to PCR    Specimen: Throat; Swab   Result Value Ref Range    Group A Strep antigen Negative Negative   TSH   Result Value Ref Range    TSH 2.18 0.40 - 4.00 mU/L   CBC with platelets and differential   Result Value Ref Range    WBC Count 6.7 4.0 - 11.0 10e3/uL    RBC Count 5.13 4.40 - 5.90 10e6/uL    Hemoglobin 16.0 13.3 - 17.7 g/dL    Hematocrit 42.7 40.0 - 53.0 %    MCV 83 78 - 100 fL    MCH 31.2 26.5 - 33.0 pg    MCHC 37.5 (H) 31.5 - 36.5 g/dL    RDW 12.1 10.0 - 15.0 %    Platelet Count 162 150 - 450 10e3/uL    % Neutrophils 53 %    % Lymphocytes 38 %    % Monocytes 6 %    % Eosinophils 2 %    % Basophils 1 %    % Immature Granulocytes 0 %    NRBCs per 100 WBC 0 <1 /100    Absolute Neutrophils 3.6 1.6 - 8.3 10e3/uL    Absolute Lymphocytes 2.5 0.8 - 5.3 10e3/uL    Absolute Monocytes 0.4 0.0 - 1.3 10e3/uL    Absolute Eosinophils 0.2 0.0 - 0.7 10e3/uL    Absolute Basophils 0.1 0.0 - 0.2 10e3/uL    Absolute Immature Granulocytes 0.0 <=0.0 10e3/uL    Absolute NRBCs 0.0 10e3/uL   Asymptomatic COVID-19 Virus (Coronavirus) by PCR Nasopharyngeal    Specimen: Nasopharyngeal; Swab   Result Value Ref Range    SARS CoV2 PCR Negative Negative    Narrative    Testing was performed using the dillan  SARS-CoV-2 & Influenza A/B Assay on the [employer]illan  [employer]  System.  This test should be ordered for the detection of SARS-COV-2 in individuals who meet SARS-CoV-2  clinical and/or epidemiological criteria. Test performance is unknown in asymptomatic patients.  This test is for in vitro diagnostic use under the FDA EUA for laboratories certified under CLIA to perform moderate and/or high complexity testing. This test has not been FDA cleared or approved.  A negative test does not rule out the presence of PCR inhibitors in the specimen or target RNA in concentration below the limit of detection for the assay. The possibility of a false negative should be considered if the patient's recent exposure or clinical presentation suggests COVID-19.  [facility] are certified under the Clinical Laboratory Improvement Amendments of 1988 (CLIA-88) as qualified to perform moderate and/or high complexity laboratory testing.   Glucose by meter   Result Value Ref Range    GLUCOSE BY METER POCT 386 (H) 70 - 99 mg/dL              Impression     Final diagnoses:   Fatigue, unspecified type   Diabetes mellitus, new onset (H)   Hyponatremia         ED Course & Medical Decision Making   Victor M Mao is a 51 year old male who presented to the emergency department with 3-week history of fatigue, sore throat, chills, body aches, loss of appetite, change in taste of food, and frequent sensation of thirst and urination.  Patient has had a previous history of elevated blood sugar but it corrected.  He has been constantly thirsty and his thirst does not seem to be quench.  Patient was seen shortly after arrival.  Temperature is 98.3, blood pressure 125/109, heart rate of 106, respiratory rate of 20 with 99% oxygen saturation.  Physical examination is unremarkable.  Laboratory work-up reveals a normal CBC.  Comprehensive metabolic panel reveals a sodium of 123, chloride of 92, CO2 of 19, glucose of 523.  Normal BUN and creatinine.  Hemoglobin A1c level is 11.4.  Rapid strep is negative.  TSH levels 2.18.  Symptomatic SARS-CoV-2 PCR test was performed.    Patient has new onset diabetes  mellitus, adult onset.  Patient also has hyponatremia, with continued polydipsia and polyuria.  He was given 1 L of normal saline followed by 500 cc/h for 2 hours.  He received insulin 12 units subcu.  We will check hourly glucose levels.  We will try to transfer the patient to a higher level of care where endocrinology consultation and diabetes education can be initiated.    I spoke with Dr. Quesada, hospitalist at [facility].  He questioned whether the patient might be able to be treated on an outpatient basis given the lack of hospital beds.  I spoke with Dr. Jesus Will, endocrinologist who is on-call.  He recommended that we begin a basal dose of long-acting insulin with Lantus at 30 units daily.  We can also add a sliding scale to use 2 units of regular insulin for every 50 mg/dL of glucose over 150.  Patient was given option of pursuing hospitalization or outpatient management with short-term follow-up.  Patient felt comfortable with instructions on self administering insulin.  You should begin glucose monitoring 4 times a day and also monitoring for symptoms of hypoglycemia.  Patient should expect a phone call from Dr. Will's office for follow-up visit at his clinic in [facility] within the next week.  Their office number is [phone].    Patient can also return to the emergency department at any time with new or worsening symptoms.      Disclaimer: This note consists of words and symbols derived from keyboarding and dictation using voice recognition software.  As a result, there may be errors that have gone undetected.  Please consider this when interpreting information found in this note.       Madelin Lopes MD  09/20/21 [id number]

## 2021-09-21 ENCOUNTER — NURSE TRIAGE (OUTPATIENT)
Dept: NURSING | Facility: CLINIC | Age: 51
End: 2021-09-21

## 2021-09-21 ENCOUNTER — TELEPHONE (OUTPATIENT)
Dept: INTERNAL MEDICINE | Facility: CLINIC | Age: 51
End: 2021-09-21

## 2021-09-21 NOTE — TELEPHONE ENCOUNTER
covid screening questions yesterday    This morning  , was seen yesterday    Feels fine today.    Went thru a lot of info fast he says.  He has the after visit summary. Reviewed some of it with him again.  He has the sliding scale to follow.  Diabetic educator to contact him to set up visit he is aware.    He will call nurse triage back if needed.    Regine Coronel RN  [facility] Nurse Advisor    Reason for Disposition    Blood glucose > 300 mg/dL (16.7 mmol/L)    Protocols used: DIABETES - HIGH BLOOD SUGAR-A-OH

## 2021-09-21 NOTE — RESULT ENCOUNTER NOTE
Group A Streptococcus PCR is NEGATIVE  No treatment or change in treatment [facility] ED lab result Strep Group A protocol.

## 2021-09-21 NOTE — TELEPHONE ENCOUNTER
Reason for Call: Request for an order or referral:    Order or referral being requested: Endocrinologist    Date needed: as soon as possible    Has the patient been seen by the PCP for this problem? NO    Additional comments: patient was just diagnosed with Type 1 diabetes in the ED . He was given a referral but its to [facility] and he is a [employer] employee and only able to stay in [employer]. Can you do a referral for him to stay in [employer]?      Phone number Patient can be reached at:  Home number on file [phone] (home)    Best Time:  Anytime      Can we leave a detailed message on this number?  YES    Call taken on 9/21/2021 at 4:12 PM by Keely Nunez

## 2021-09-22 NOTE — TELEPHONE ENCOUNTER
I would be happy to set the patient up with an endocrinologist there but this will take several weeks to get the patient in.    As it turns out, I am an internal medicine physician and a great deal of my practice is treating patients with diabetes.    If the patient would like to set up an appointment with me, we can initiate therapy, discuss appropriate treatment, medications, diet etc. all in a timely fashion.    This can all be done while he is waiting to see the endocrinologist if that is even necessary.    Most people with recently diagnosed diabetes do not need to see an endocrinologist as this is a very common medical situation.    [place]

## 2021-09-24 ENCOUNTER — OFFICE VISIT (OUTPATIENT)
Dept: INTERNAL MEDICINE | Facility: CLINIC | Age: 51
End: 2021-09-24
Payer: COMMERCIAL

## 2021-09-24 VITALS
WEIGHT: 265.4 LBS | OXYGEN SATURATION: 97 % | HEART RATE: 106 BPM | RESPIRATION RATE: 18 BRPM | BODY MASS INDEX: 32.73 KG/M2 | TEMPERATURE: 96.2 F | SYSTOLIC BLOOD PRESSURE: 134 MMHG | DIASTOLIC BLOOD PRESSURE: 88 MMHG

## 2021-09-24 DIAGNOSIS — F33.41 RECURRENT MAJOR DEPRESSIVE DISORDER, IN PARTIAL REMISSION (H): ICD-10-CM

## 2021-09-24 DIAGNOSIS — E13.9 OTHER SPECIFIED DIABETES MELLITUS WITHOUT COMPLICATION, WITHOUT LONG-TERM CURRENT USE OF INSULIN (H): Primary | ICD-10-CM

## 2021-09-24 PROBLEM — E11.9 DIABETES MELLITUS (H): Status: ACTIVE | Noted: 2021-09-24

## 2021-09-24 LAB — INSULIN SERPL-ACNC: 31.8 MU/L (ref 3–25)

## 2021-09-24 PROCEDURE — 83525 ASSAY OF INSULIN: CPT | Performed by: INTERNAL MEDICINE

## 2021-09-24 PROCEDURE — 99214 OFFICE O/P EST MOD 30 MIN: CPT | Performed by: INTERNAL MEDICINE

## 2021-09-24 PROCEDURE — 36415 COLL VENOUS BLD VENIPUNCTURE: CPT | Performed by: INTERNAL MEDICINE

## 2021-09-24 ASSESSMENT — PAIN SCALES - GENERAL: PAINLEVEL: NO PAIN (0)

## 2021-09-24 NOTE — PROGRESS NOTES
Jordan Patricia is a 51 year old who presents for the following health issues:    HPI     Diabetes Follow-up    How often are you checking your blood sugar? Four or more times daily  Blood sugar testing frequency justification:  Patient modifying lifestyle changes (diet, exercise) with blood sugars  What time of day are you checking your blood sugars (select all that apply)?  Before meals, After meals and 1st thing in the morning, continuous through the day  Have you had any blood sugars above 200?  Yes highest 310 (9/22/21)  Have you had any blood sugars below 70?  No    What symptoms do you notice when your blood sugar is low?  None    What concerns do you have today about your diabetes? None, Blood sugar is often over 200 and Other: gsm     Do you have any of these symptoms? (Select all that apply)  No numbness or tingling in feet.  No redness, sores or blisters on feet.  No complaints of excessive thirst.  No reports of blurry vision.  No significant changes to weight.    Have you had a diabetic eye exam in the last 12 months? No        BP Readings from Last 2 Encounters:   09/24/21 134/88   09/20/21 (!) 148/72     Hemoglobin A1C (%)   Date Value   09/20/2021 11.4 (H)   09/25/2020 5.7 (H)   05/10/2020 5.9 (H)     LDL Cholesterol Calculated (mg/dL)   Date Value   12/14/2018 129 (H)                 How many servings of fruits and vegetables do you eat daily?  0-1    On average, how many sweetened beverages do you drink each day (Examples: soda, juice, sweet tea, etc.  Do NOT count diet or artificially sweetened beverages)?   0    How many days per week do you exercise enough to make your heart beat faster? 3 or less    How many minutes a day do you exercise enough to make your heart beat faster? 10 - 19    How many days per week do you miss taking your medication? 0       EMR reviewed including:             Complaint, History of Chief Complaint, Corresponding Review of Systems, and Complaint Specific  Physical Examination.    #1   Newly diagnosed diabetes  Patient previously seen in the emergency department due to hyperglycemia.  Blood sugar elevated over 500 in emergency department.  Patient told by ER staff he is a newly diagnosed type I diabetic, but that is yet to be determined.  Patient prescribed aspart and glargine for management of type 1 diabetes; blood sugars remain elevated.  Reported checking blood glucose 14 times a day.  No reported changes in weight gain/loss, changes in vision, neuropathy.        Exam:   LUNGS: clear bilaterally, airflow is brisk, no intercostal retraction or stridor is noted. No coughing is noted during visit.   HEART:  regular without rubs, clicks, gallops, or murmurs. PMI is nondisplaced. Upstrokes are brisk. S1,S2 are heard.   PSYCH: The patient appears grossly appropriate. Maintains good eye contact, does not have any jittery or atypical motion. Displays appropriate affect.  Previously noted depression seems to be markedly improved.  Patient notes no concerns, suicidal ideation, weight changes or difficulty sleeping.        Vital Signs:   /88 (BP Location: Right arm, Patient Position: Sitting)   Pulse 106   Temp (!) 96.2  F (35.7  C) (Temporal)   Resp 18   Wt 120.4 kg (265 lb 6.4 oz)   SpO2 97%   BMI 32.73 kg/m               Decision Making    Problem and Complexity     1. Other specified diabetes mellitus without complication, without long-term current use of insulin (H)  On today's visit, patient was informed to discontinue aspirin glargine as previously prescribed.  New prescription for 500 mg tablets of Metformin to be taken twice a day for management of diabetes.  We will obtain insulin level to check for diabetes.  Patient educated to check glucose at different times throughout the day only twice a day rather than 14.    2. Recurrent major depressive disorder, in partial remission (H)  Markedly improved with citalopram.  No dose changes indicated.               Time spent With/On Patient  Length of time   45 minutes      Chart reviewed  y    Review of outside records y    Review of test results y    Interpretation of results y     Patient visit  y  Documentation  n  Discussion with family n  Discussion with providers n                                FOLLOW UP   I have asked the patient to make an appointment for followup with me in 2 weeks to discuss insulin level and management of blood glucose with at home monitoring.          I have carefully explained the diagnosis and treatment options to the patient.  The patient has displayed an understanding of the above, and all subsequent questions were answered.    This patient has been interviewed, examined, diagnosed, and informed of the above by me personally.  Medical records and available pertinent information has been reviewed by me personally.  All decisions and discussion have been between myself and the patient/family.  This was done in the presence of Alexander Churchill  , who acted as a medical scribe and recorded the events above.  No diagnosis or decision making was made by the above-mentioned scribe.  The patient, and or his/her ensurors will not be billed for the presence or actions of this scribe.  The information recorded by the scribe has been reviewed by me and found to be accurate.      DO JUAN Mas    Portions of this note were produced using [employer]  Although every attempt at real-time proof reading has been made, occasional grammar/syntax errors may have been missed.

## 2021-09-25 PROBLEM — F33.41 RECURRENT MAJOR DEPRESSIVE DISORDER, IN PARTIAL REMISSION (H): Status: ACTIVE | Noted: 2021-09-25

## 2021-10-09 ENCOUNTER — HEALTH MAINTENANCE LETTER (OUTPATIENT)
Age: 51
End: 2021-10-09

## 2021-10-12 ENCOUNTER — OFFICE VISIT (OUTPATIENT)
Dept: INTERNAL MEDICINE | Facility: CLINIC | Age: 51
End: 2021-10-12
Payer: COMMERCIAL

## 2021-10-12 VITALS
RESPIRATION RATE: 20 BRPM | HEART RATE: 92 BPM | DIASTOLIC BLOOD PRESSURE: 82 MMHG | WEIGHT: 270.2 LBS | SYSTOLIC BLOOD PRESSURE: 134 MMHG | OXYGEN SATURATION: 99 % | TEMPERATURE: 97.5 F | BODY MASS INDEX: 33.33 KG/M2

## 2021-10-12 DIAGNOSIS — F41.9 ANXIETY: ICD-10-CM

## 2021-10-12 DIAGNOSIS — I10 BENIGN ESSENTIAL HYPERTENSION: ICD-10-CM

## 2021-10-12 DIAGNOSIS — J30.2 SEASONAL ALLERGIC RHINITIS, UNSPECIFIED TRIGGER: ICD-10-CM

## 2021-10-12 DIAGNOSIS — N52.9 ERECTILE DYSFUNCTION, UNSPECIFIED ERECTILE DYSFUNCTION TYPE: ICD-10-CM

## 2021-10-12 DIAGNOSIS — K21.00 GASTROESOPHAGEAL REFLUX DISEASE WITH ESOPHAGITIS WITHOUT HEMORRHAGE: ICD-10-CM

## 2021-10-12 DIAGNOSIS — E11.9 TYPE 2 DIABETES MELLITUS WITHOUT COMPLICATION, WITHOUT LONG-TERM CURRENT USE OF INSULIN (H): Primary | ICD-10-CM

## 2021-10-12 DIAGNOSIS — F33.41 RECURRENT MAJOR DEPRESSIVE DISORDER, IN PARTIAL REMISSION (H): ICD-10-CM

## 2021-10-12 PROCEDURE — 99214 OFFICE O/P EST MOD 30 MIN: CPT | Performed by: INTERNAL MEDICINE

## 2021-10-12 RX ORDER — OMEPRAZOLE 40 MG/1
40 CAPSULE, DELAYED RELEASE ORAL AT BEDTIME
Qty: 90 CAPSULE | Refills: 3 | Status: SHIPPED | OUTPATIENT
Start: 2021-10-12 | End: 2022-10-18

## 2021-10-12 RX ORDER — LISINOPRIL AND HYDROCHLOROTHIAZIDE 12.5; 2 MG/1; MG/1
1 TABLET ORAL DAILY
Qty: 90 TABLET | Refills: 3 | Status: SHIPPED | OUTPATIENT
Start: 2021-10-12 | End: 2022-10-18

## 2021-10-12 RX ORDER — LORAZEPAM 0.5 MG/1
1 TABLET ORAL EVERY 8 HOURS PRN
Qty: 60 TABLET | Refills: 0 | Status: SHIPPED | OUTPATIENT
Start: 2021-10-12

## 2021-10-12 RX ORDER — ATORVASTATIN CALCIUM 20 MG/1
20 TABLET, FILM COATED ORAL DAILY
Qty: 90 TABLET | Refills: 3 | Status: SHIPPED | OUTPATIENT
Start: 2021-10-12 | End: 2022-09-16

## 2021-10-12 RX ORDER — SILDENAFIL 100 MG/1
TABLET, FILM COATED ORAL
Qty: 10 TABLET | Refills: 3 | Status: SHIPPED | OUTPATIENT
Start: 2021-10-12

## 2021-10-12 RX ORDER — FLUTICASONE PROPIONATE 50 MCG
1 SPRAY, SUSPENSION (ML) NASAL DAILY PRN
Qty: 18 G | Refills: 3 | Status: SHIPPED | OUTPATIENT
Start: 2021-10-12 | End: 2022-01-21

## 2021-10-12 RX ORDER — ASPIRIN 81 MG/1
81 TABLET ORAL DAILY
COMMUNITY

## 2021-10-12 RX ORDER — CITALOPRAM HYDROBROMIDE 40 MG/1
40 TABLET ORAL AT BEDTIME
Qty: 90 TABLET | Refills: 3 | Status: SHIPPED | OUTPATIENT
Start: 2021-10-12 | End: 2022-10-18

## 2021-10-12 ASSESSMENT — PAIN SCALES - GENERAL: PAINLEVEL: NO PAIN (0)

## 2021-10-12 NOTE — PROGRESS NOTES
Jordan Patricia is a 51 year old who presents for the following health issues     HPI     Diabetes Follow-up    How often are you checking your blood sugar? Four or more times daily  Blood sugar testing frequency justification:  Uncontrolled diabetes  What time of day are you checking your blood sugars (select all that apply)?  Before and after meals  Have you had any blood sugars above 200?  No  Have you had any blood sugars below 70?  No    What symptoms do you notice when your blood sugar is low?  None    What concerns do you have today about your diabetes? Other: new diagnosis      Do you have any of these symptoms? (Select all that apply)  No numbness or tingling in feet.  No redness, sores or blisters on feet.  No complaints of excessive thirst.  No reports of blurry vision.  No significant changes to weight.    Have you had a diabetic eye exam in the last 12 months? No        BP Readings from Last 2 Encounters:   10/12/21 134/82   09/24/21 134/88     Hemoglobin A1C (%)   Date Value   09/20/2021 11.4 (H)   09/25/2020 5.7 (H)   05/10/2020 5.9 (H)     LDL Cholesterol Calculated (mg/dL)   Date Value   12/14/2018 129 (H)        EMR reviewed including:             Complaint, History of Chief Complaint, Corresponding Review of Systems, and Complaint Specific Physical Examination.    #1   Follow-up on recently diagnosed type 2 diabetes  Now on Metformin with average blood sugar about 125.  Highest value about 150 and lowest value above 100.  No polyuria or polydipsia.  Muscle pain and fatigue have resolved.  Denies diarrhea or gastrointestinal symptoms from Metformin.  Watching diet aggressively.  Checking blood sugars twice daily  .      Exam:   LUNGS: clear bilaterally, airflow is brisk, no intercostal retraction or stridor is noted. No coughing is noted during visit.   HEART:  regular without rubs, clicks, gallops, or murmurs. PMI is nondisplaced. Upstrokes are brisk. S1,S2 are heard.   NEURO: Pt is  alert and appropriate. No neurologic lateralization is noted. Cranial nerves 2-12 are intact. Peripheral sensory and motor function are grossly normal.    Constitutional: The patient appears to be in no acute distress. The patient appears to be adequately hydrated. No acute respiratory or hemodynamic distress is noted at this time.      #2   Depression with anxiety  Controlled on current medications.  Depression markedly improved with correction of hyperglycemia.  Denies any suicidal ideation specifically.        Exam:   PSYCH: The patient appears grossly appropriate. Maintains good eye contact, does not have any jittery or atypical motion. Displays appropriate affect.      #3   Ongoing hypertension  Currently on ACE inhibitor.  Denies cough or side effects.  Blood pressure controlled at 134/82        Vital Signs:   /82 (BP Location: Right arm, Patient Position: Sitting, Cuff Size: Adult Large)   Pulse 92   Temp 97.5  F (36.4  C) (Temporal)   Resp 20   Wt 122.6 kg (270 lb 3.2 oz)   SpO2 99%   BMI 33.33 kg/m               Decision Making    Problem and Complexity     1. Type 2 diabetes mellitus without complication, without long-term current use of insulin (H)  We will set up diabetic education and continue current Metformin.  Start statin therapy  - [facility] Adult Diabetes Educator Referral; Future  - atorvastatin (LIPITOR) 20 MG tablet; Take 1 tablet (20 mg) by mouth daily  Dispense: 90 tablet; Refill: 3  - metFORMIN (GLUCOPHAGE) 500 MG tablet; Take 1 tablet (500 mg) by mouth 2 times daily (with meals)  Dispense: 180 tablet; Refill: 3    2. Recurrent major depressive disorder, in partial remission (H)  Continue current medication  - citalopram (CELEXA) 40 MG tablet; Take 1 tablet (40 mg) by mouth At Bedtime  Dispense: 90 tablet; Refill: 3    3. Anxiety  Patient uses very sparingly.  Previous bottle of medication has actually   - LORazepam (ATIVAN) 0.5 MG tablet; Take 2 tablets (1 mg) by mouth every 8  hours as needed for anxiety  Dispense: 60 tablet; Refill: 0    4. Seasonal allergic rhinitis, unspecified trigger  Continue Flonase  - fluticasone (FLONASE) 50 MCG/ACT nasal spray; Spray 1 spray into both nostrils daily as needed for allergies  Dispense: 18 g; Refill: 3    5. Benign essential hypertension  Continue ACE inhibitor  - lisinopril-hydrochlorothiazide (ZESTORETIC) 20-12.5 MG tablet; Take 1 tablet by mouth daily  Dispense: 90 tablet; Refill: 3    6. Gastroesophageal reflux disease with esophagitis without hemorrhage  Stable  - omeprazole (PRILOSEC) 40 MG DR capsule; Take 1 capsule (40 mg) by mouth At Bedtime  Dispense: 90 capsule; Refill: 3    7. Erectile dysfunction, unspecified erectile dysfunction type  Continue as needed  - sildenafil (VIAGRA) 100 MG tablet; TAKE 1 TABLET (=100MG) BY MOUTH DAILY AS NEEDED (ERECTILE DYSFUNCTION)  Dispense: 10 tablet; Refill: 3                                    FOLLOW UP   I have asked the patient to make an appointment for followup with me in 3 months          I have carefully explained the diagnosis and treatment options to the patient.  The patient has displayed an understanding of the above, and all subsequent questions were answered.      DO JUAN Mas    Portions of this note were produced using [employer]  Although every attempt at real-time proof reading has been made, occasional grammar/syntax errors may have been missed.

## 2021-10-13 ENCOUNTER — TELEPHONE (OUTPATIENT)
Dept: INTERNAL MEDICINE | Facility: CLINIC | Age: 51
End: 2021-10-13
Payer: COMMERCIAL

## 2021-10-19 ENCOUNTER — TELEPHONE (OUTPATIENT)
Dept: INTERNAL MEDICINE | Facility: CLINIC | Age: 51
End: 2021-10-19
Payer: COMMERCIAL

## 2021-10-19 NOTE — TELEPHONE ENCOUNTER
Diabetes Education Scheduling Outreach #2:    Call to patient to schedule. Left message with phone number to call to schedule.      Meghan Almanza  [facility]  Diabetes and Nutrition Scheduling

## 2021-10-29 ENCOUNTER — TRANSFERRED RECORDS (OUTPATIENT)
Dept: HEALTH INFORMATION MANAGEMENT | Facility: CLINIC | Age: 51
End: 2021-10-29
Payer: COMMERCIAL

## 2021-10-29 DIAGNOSIS — E13.9 OTHER SPECIFIED DIABETES MELLITUS WITHOUT COMPLICATION, WITHOUT LONG-TERM CURRENT USE OF INSULIN (H): Primary | ICD-10-CM

## 2021-10-29 LAB — RETINOPATHY: NEGATIVE

## 2021-12-08 ENCOUNTER — DOCUMENTATION ONLY (OUTPATIENT)
Dept: INTERNAL MEDICINE | Facility: CLINIC | Age: 51
End: 2021-12-08
Payer: COMMERCIAL

## 2021-12-08 NOTE — PROGRESS NOTES
Please abstract the following data from this visit with this patient into the appropriate field in Epic:    Tests that can be patient reported without a hard copy:    Other Tests found in the patient's chart through Chart Review/Care Everywhere:    Eye exam with ophthalmology on this date: 10/29/2021    Note to [employer]: If this section is blank, no results were found via Chart Review/Care Everywhere.    Yomi Navarro PharmD, BCACP  Medication Therapy Management Pharmacist  Pager: [phone]

## 2021-12-11 DIAGNOSIS — E13.9 OTHER SPECIFIED DIABETES MELLITUS WITHOUT COMPLICATION, WITHOUT LONG-TERM CURRENT USE OF INSULIN (H): ICD-10-CM

## 2022-01-10 DIAGNOSIS — E11.9 TYPE 2 DIABETES MELLITUS WITHOUT COMPLICATION, WITHOUT LONG-TERM CURRENT USE OF INSULIN (H): Primary | ICD-10-CM

## 2022-01-10 NOTE — TELEPHONE ENCOUNTER
Patient needs a new blood glucose meter due to new insurance only covering a specific brand. Sent to refill pool.    Zack Baeza RN

## 2022-01-21 ENCOUNTER — OFFICE VISIT (OUTPATIENT)
Dept: INTERNAL MEDICINE | Facility: CLINIC | Age: 52
End: 2022-01-21
Payer: COMMERCIAL

## 2022-01-21 VITALS
HEIGHT: 76 IN | OXYGEN SATURATION: 100 % | BODY MASS INDEX: 33.22 KG/M2 | TEMPERATURE: 97.8 F | WEIGHT: 272.8 LBS | DIASTOLIC BLOOD PRESSURE: 102 MMHG | SYSTOLIC BLOOD PRESSURE: 146 MMHG | HEART RATE: 82 BPM

## 2022-01-21 DIAGNOSIS — E11.9 TYPE 2 DIABETES MELLITUS WITHOUT COMPLICATION, WITHOUT LONG-TERM CURRENT USE OF INSULIN (H): Primary | ICD-10-CM

## 2022-01-21 DIAGNOSIS — E78.5 HYPERLIPIDEMIA LDL GOAL <100: ICD-10-CM

## 2022-01-21 DIAGNOSIS — I10 BENIGN ESSENTIAL HYPERTENSION: ICD-10-CM

## 2022-01-21 DIAGNOSIS — Z11.59 ENCOUNTER FOR HEPATITIS C SCREENING TEST FOR LOW RISK PATIENT: ICD-10-CM

## 2022-01-21 DIAGNOSIS — J30.2 SEASONAL ALLERGIC RHINITIS, UNSPECIFIED TRIGGER: ICD-10-CM

## 2022-01-21 DIAGNOSIS — F33.41 RECURRENT MAJOR DEPRESSIVE DISORDER, IN PARTIAL REMISSION (H): ICD-10-CM

## 2022-01-21 DIAGNOSIS — R68.82 DECREASED LIBIDO: ICD-10-CM

## 2022-01-21 LAB
ALBUMIN SERPL-MCNC: 3.5 G/DL (ref 3.4–5)
ALP SERPL-CCNC: 51 U/L (ref 40–150)
ALT SERPL W P-5'-P-CCNC: 85 U/L (ref 0–70)
ANION GAP SERPL CALCULATED.3IONS-SCNC: 6 MMOL/L (ref 3–14)
AST SERPL W P-5'-P-CCNC: 36 U/L (ref 0–45)
BILIRUB SERPL-MCNC: 0.3 MG/DL (ref 0.2–1.3)
BUN SERPL-MCNC: 16 MG/DL (ref 7–30)
CALCIUM SERPL-MCNC: 8.5 MG/DL (ref 8.5–10.1)
CHLORIDE BLD-SCNC: 113 MMOL/L (ref 94–109)
CHOLEST SERPL-MCNC: 155 MG/DL
CO2 SERPL-SCNC: 20 MMOL/L (ref 20–32)
CREAT SERPL-MCNC: 0.88 MG/DL (ref 0.66–1.25)
CREAT UR-MCNC: 94 MG/DL
FASTING STATUS PATIENT QL REPORTED: YES
GFR SERPL CREATININE-BSD FRML MDRD: >90 ML/MIN/1.73M2
GLUCOSE BLD-MCNC: 95 MG/DL (ref 70–99)
HBA1C MFR BLD: 6.3 % (ref 0–5.6)
HDLC SERPL-MCNC: 33 MG/DL
LDLC SERPL CALC-MCNC: 92 MG/DL
MICROALBUMIN UR-MCNC: 20 MG/L
MICROALBUMIN/CREAT UR: 21.28 MG/G CR (ref 0–17)
NONHDLC SERPL-MCNC: 122 MG/DL
POTASSIUM BLD-SCNC: 3.9 MMOL/L (ref 3.4–5.3)
PROT SERPL-MCNC: 7.7 G/DL (ref 6.8–8.8)
SODIUM SERPL-SCNC: 139 MMOL/L (ref 133–144)
TRIGL SERPL-MCNC: 150 MG/DL

## 2022-01-21 PROCEDURE — 36415 COLL VENOUS BLD VENIPUNCTURE: CPT | Performed by: INTERNAL MEDICINE

## 2022-01-21 PROCEDURE — 86803 HEPATITIS C AB TEST: CPT | Performed by: INTERNAL MEDICINE

## 2022-01-21 PROCEDURE — 80061 LIPID PANEL: CPT | Performed by: INTERNAL MEDICINE

## 2022-01-21 PROCEDURE — 83036 HEMOGLOBIN GLYCOSYLATED A1C: CPT | Performed by: INTERNAL MEDICINE

## 2022-01-21 PROCEDURE — 82043 UR ALBUMIN QUANTITATIVE: CPT | Performed by: INTERNAL MEDICINE

## 2022-01-21 PROCEDURE — 99214 OFFICE O/P EST MOD 30 MIN: CPT | Performed by: INTERNAL MEDICINE

## 2022-01-21 PROCEDURE — 84403 ASSAY OF TOTAL TESTOSTERONE: CPT | Performed by: INTERNAL MEDICINE

## 2022-01-21 PROCEDURE — 80053 COMPREHEN METABOLIC PANEL: CPT | Performed by: INTERNAL MEDICINE

## 2022-01-21 RX ORDER — DAPAGLIFLOZIN 5 MG/1
5 TABLET, FILM COATED ORAL DAILY
Qty: 30 TABLET | Refills: 0 | Status: SHIPPED | OUTPATIENT
Start: 2022-01-21

## 2022-01-21 RX ORDER — FLUTICASONE PROPIONATE 50 MCG
1 SPRAY, SUSPENSION (ML) NASAL DAILY PRN
Qty: 18 G | Refills: 3 | Status: SHIPPED | OUTPATIENT
Start: 2022-01-21 | End: 2022-10-18

## 2022-01-21 ASSESSMENT — PAIN SCALES - GENERAL: PAINLEVEL: NO PAIN (0)

## 2022-01-21 ASSESSMENT — MIFFLIN-ST. JEOR: SCORE: 2196.16

## 2022-01-21 NOTE — PROGRESS NOTES
Jordan Patricia is a 51 year old who presents for the follow up of hypertension and diabetes     HPI   #1 Diabetes  - recent diagnosis of T2DM after emergency visit 09/20/2021  - currently taking metformin BID; experiencing diarrhea and frequency/urgency   - patient interested in exploring alternative options for management (note: has anaphylactic sulfa allergy)  - checking BG at home: fasting in AM is ~120   - denies open sores, ulcerations, or poorly healing lesions   - no episodes of hypoglycemia or BG >200   - making dietary modifications (paleo/mediteranean diet) and interested in diabetic education    #2 Hypertension   - currently managed with Zestoretic   - not checking BP at home   - 146/102 in office today   - making dietary modifications     #3 Hyperlipidemia   - currently managed on atorvastatin; tolerating well with no myalgias  - making dietary modifications     #4 Decreased libido   - patient would like to have testosterone level assessed   - reports decreased libido       Diabetes Follow-up      How often are you checking your blood sugar? Three to four times daily, checks in the morning and before and after meals    What concerns do you have today about your diabetes? Will metformin be a long term plan, doing a low carb diet     Do you have any of these symptoms? (Select all that apply)  No numbness or tingling in feet.  No redness, sores or blisters on feet.  No complaints of excessive thirst.  No reports of blurry vision.  No significant changes to weight.      BP Readings from Last 2 Encounters:   10/12/21 134/82   09/24/21 134/88     Hemoglobin A1C POCT (%)   Date Value   09/25/2020 5.7 (H)   05/10/2020 5.9 (H)     Hemoglobin A1C (%)   Date Value   09/20/2021 11.4 (H)     LDL Cholesterol Calculated (mg/dL)   Date Value   12/14/2018 129 (H)                 How many servings of fruits and vegetables do you eat daily?  0-1    On average, how many sweetened beverages do you drink each day  (Examples: soda, juice, sweet tea, etc.  Do NOT count diet or artificially sweetened beverages)?   0    How many days per week do you exercise enough to make your heart beat faster? 0 but walks a lot at work    How many minutes a day do you exercise enough to make your heart beat faster? 0  How many days per week do you miss taking your medication? 1    What makes it hard for you to take your medications?  remembering to take, may only miss once a month, using a pill box      Review of Systems   CONSTITUTIONAL: NEGATIVE for fever, chills, change in weight  INTEGUMENTARY/SKIN: NEGATIVE for worrisome rashes, moles or lesions; reports underlying dermatitis for several years   EYES: NEGATIVE for vision changes or irritation  RESP: NEGATIVE for significant cough or SOB  CV: NEGATIVE for chest pain, palpitations or peripheral edema  GI: NEGATIVE for nausea, abdominal pain, heartburn; diarrhea/frequency/urgency since starting Metformin; Hx of IBS  MUSCULOSKELETAL: NEGATIVE for significant arthralgias or myalgia  PSYCHIATRIC: NEGATIVE for changes in mood or affect      Objective    There were no vitals taken for this visit.  There is no height or weight on file to calculate BMI.     Physical Exam   GENERAL: healthy, alert and no distress  EYES: Eyes grossly normal to inspection, PERRL and conjunctivae and sclerae normal  NECK: no adenopathy, no asymmetry, masses, or scars and thyroid normal to palpation  RESP: lungs clear to auscultation - no rales, rhonchi or wheezes  CV: regular rate and rhythm, normal S1 S2, no S3 or S4, no murmur, click or rub, no peripheral edema and peripheral pulses strong  ABDOMEN: soft, nontender, no hepatosplenomegaly, no masses and bowel sounds normal  MS: no gross musculoskeletal defects noted, no edema  SKIN: no suspicious lesions or rashes; some mild dermatitis noted as patient mentioned  NEURO: Normal tone, mentation intact and speech normal  PSYCH: mentation appears normal, affect  normal/bright      1. Type 2 diabetes mellitus without complication, without long-term current use of insulin (H)  - [facility] Adult Diabetes Educator Referral; Future  - Comprehensive metabolic panel (BMP + Alb, Alk Phos, ALT, AST, Total. Bili, TP); Future  - Hemoglobin A1c (aka HBA1C); Future  - Albumin Random Urine Quantitative with Creat Ratio; Future  - dapagliflozin (FARXIGA) 5 MG TABS tablet; Take 1 tablet (5 mg) by mouth daily  Dispense: 30 tablet; Refill: 0  - Albumin Random Urine Quantitative with Creat Ratio  - Hemoglobin A1c (aka HBA1C)  - Comprehensive metabolic panel (BMP + Alb, Alk Phos, ALT, AST, Total. Bili, TP)  - Patient will continue taking Metformin (Qday rather than BID as previously prescribed)     2. Benign essential hypertension  - Comprehensive metabolic panel (BMP + Alb, Alk Phos, ALT, AST, Total. Bili, TP); Future  - Albumin Random Urine Quantitative with Creat Ratio; Future  - Albumin Random Urine Quantitative with Creat Ratio  - Comprehensive metabolic panel (BMP + Alb, Alk Phos, ALT, AST, Total. Bili, TP)  - Continue medication as prescribed  - Patient will purchase BP monitor, monitor and record results, and send through [employer] to provider; medication dosage will be reassessed and adjusted pending home BP recordings    3. Hyperlipidemia LDL goal <100  - Lipid panel reflex to direct LDL Fasting; Future  - Lipid panel reflex to direct LDL Fasting  - Continue medication as prescribed; will reassess dosage pending lab results     4. Decreased libido  - Testosterone, total; Future  - Testosterone, total  - Will discuss further management options at next visit pending testosterone results     5. Recurrent major depressive disorder, in partial remission (H)  - Stable; continue medication as prescribed     6. Seasonal allergic rhinitis, unspecified trigger  - fluticasone (FLONASE) 50 MCG/ACT nasal spray; Spray 1 spray into both nostrils daily as needed for allergies  Dispense: 18 g; Refill:  3    7. Encounter for hepatitis C screening test for low risk patient  - Hepatitis C Screen Reflex to HCV RNA Quant and Genotype; Future  - Hepatitis C Screen Reflex to HCV RNA Quant and Genotype        Medical Scribe: STEVEN Francois-S2            DO JUAN Mas    Portions of this note were produced using [employer]  Although every attempt at real-time proof reading has been made, occasional grammar/syntax errors may have been missed.           This patient has been interviewed, examined, diagnosed, and informed of the above by me personally.  Medical records and available pertinent information has been reviewed by me personally.  All decisions and discussion have been between myself and the patient/family.  This was done in the presence of Janie Arroyo, who acted as a medical scribe and recorded the events above.  No diagnosis or decision making was made by the above-mentioned scribe.  The patient, and or his/her ensurors will not be billed for the presence or actions of this scribe.  The information recorded by the scribe has been reviewed by me and found to be accurate.

## 2022-01-24 LAB — HCV AB SERPL QL IA: NONREACTIVE

## 2022-01-27 LAB — TESTOST SERPL-MCNC: 259 NG/DL (ref 240–950)

## 2022-01-28 ENCOUNTER — TELEPHONE (OUTPATIENT)
Dept: INTERNAL MEDICINE | Facility: CLINIC | Age: 52
End: 2022-01-28
Payer: COMMERCIAL

## 2022-01-28 NOTE — TELEPHONE ENCOUNTER
Diabetes Education Scheduling Outreach #1:    Call to patient to schedule. Left message with phone number to call to schedule.    Plan for 2nd outreach attempt within 1 week.    Bonnie Mitchell  [facility] [facility]  Diabetes and Nutrition Scheduling

## 2022-01-29 ENCOUNTER — HEALTH MAINTENANCE LETTER (OUTPATIENT)
Age: 52
End: 2022-01-29

## 2022-02-03 NOTE — TELEPHONE ENCOUNTER
Diabetes Education Scheduling Outreach #2:    Call to patient to schedule. Left message with phone number to call to schedule.    Bonnie Mitchell  [place] [facility]  Diabetes and Nutrition Scheduling

## 2022-03-09 NOTE — TELEPHONE ENCOUNTER
Diabetes Education Scheduling Outreach #1:    Call to patient to schedule. Left message with phone number to call to schedule.    Plan for 2nd outreach attempt within 1 week.    Meghan Almanza  [facility] [facility]  Diabetes and Nutrition Scheduling       Tremfya Pregnancy And Lactation Text: The risk during pregnancy and breastfeeding is uncertain with this medication.

## 2022-05-21 ENCOUNTER — HEALTH MAINTENANCE LETTER (OUTPATIENT)
Age: 52
End: 2022-05-21

## 2022-09-14 DIAGNOSIS — E11.9 TYPE 2 DIABETES MELLITUS WITHOUT COMPLICATION, WITHOUT LONG-TERM CURRENT USE OF INSULIN (H): ICD-10-CM

## 2022-09-16 RX ORDER — ATORVASTATIN CALCIUM 20 MG/1
20 TABLET, FILM COATED ORAL DAILY
Qty: 90 TABLET | Refills: 3 | Status: SHIPPED | OUTPATIENT
Start: 2022-09-16 | End: 2022-10-18

## 2022-09-16 NOTE — TELEPHONE ENCOUNTER
Routing refill request to provider for review/approval because:  Labs not current:  A1C  Patient needs to be seen because:  6 month visit due      SHIMON ChatterjeeN, RN

## 2022-09-16 NOTE — TELEPHONE ENCOUNTER
Prescription approved per [facility] Refill Protocol.    Zack Baeza,BSN, RN

## 2022-09-17 ENCOUNTER — HEALTH MAINTENANCE LETTER (OUTPATIENT)
Age: 52
End: 2022-09-17

## 2022-10-17 DIAGNOSIS — K21.00 GASTROESOPHAGEAL REFLUX DISEASE WITH ESOPHAGITIS WITHOUT HEMORRHAGE: ICD-10-CM

## 2022-10-17 DIAGNOSIS — I10 BENIGN ESSENTIAL HYPERTENSION: ICD-10-CM

## 2022-10-17 DIAGNOSIS — F33.41 RECURRENT MAJOR DEPRESSIVE DISORDER, IN PARTIAL REMISSION (H): ICD-10-CM

## 2022-10-18 ENCOUNTER — OFFICE VISIT (OUTPATIENT)
Dept: INTERNAL MEDICINE | Facility: CLINIC | Age: 52
End: 2022-10-18
Payer: COMMERCIAL

## 2022-10-18 VITALS
HEART RATE: 104 BPM | DIASTOLIC BLOOD PRESSURE: 86 MMHG | SYSTOLIC BLOOD PRESSURE: 138 MMHG | TEMPERATURE: 97.5 F | OXYGEN SATURATION: 97 % | RESPIRATION RATE: 20 BRPM | WEIGHT: 262.25 LBS | BODY MASS INDEX: 32.07 KG/M2

## 2022-10-18 DIAGNOSIS — E11.65 TYPE 2 DIABETES MELLITUS WITH HYPERGLYCEMIA, WITHOUT LONG-TERM CURRENT USE OF INSULIN (H): ICD-10-CM

## 2022-10-18 DIAGNOSIS — N52.9 ERECTILE DYSFUNCTION, UNSPECIFIED ERECTILE DYSFUNCTION TYPE: ICD-10-CM

## 2022-10-18 DIAGNOSIS — N40.1 BENIGN PROSTATIC HYPERPLASIA WITH URINARY FREQUENCY: ICD-10-CM

## 2022-10-18 DIAGNOSIS — F33.41 RECURRENT MAJOR DEPRESSIVE DISORDER, IN PARTIAL REMISSION (H): ICD-10-CM

## 2022-10-18 DIAGNOSIS — I10 BENIGN ESSENTIAL HYPERTENSION: ICD-10-CM

## 2022-10-18 DIAGNOSIS — K21.00 GASTROESOPHAGEAL REFLUX DISEASE WITH ESOPHAGITIS WITHOUT HEMORRHAGE: ICD-10-CM

## 2022-10-18 DIAGNOSIS — R35.0 BENIGN PROSTATIC HYPERPLASIA WITH URINARY FREQUENCY: ICD-10-CM

## 2022-10-18 DIAGNOSIS — J30.2 SEASONAL ALLERGIC RHINITIS, UNSPECIFIED TRIGGER: ICD-10-CM

## 2022-10-18 DIAGNOSIS — Z12.11 SCREEN FOR COLON CANCER: ICD-10-CM

## 2022-10-18 DIAGNOSIS — L64.9 MALE PATTERN BALDNESS: ICD-10-CM

## 2022-10-18 LAB
ALBUMIN SERPL-MCNC: 4 G/DL (ref 3.4–5)
ALP SERPL-CCNC: 60 U/L (ref 40–150)
ALT SERPL W P-5'-P-CCNC: 100 U/L (ref 0–70)
ANION GAP SERPL CALCULATED.3IONS-SCNC: 6 MMOL/L (ref 3–14)
AST SERPL W P-5'-P-CCNC: 48 U/L (ref 0–45)
BILIRUB SERPL-MCNC: 0.7 MG/DL (ref 0.2–1.3)
BUN SERPL-MCNC: 21 MG/DL (ref 7–30)
CALCIUM SERPL-MCNC: 9.2 MG/DL (ref 8.5–10.1)
CHLORIDE BLD-SCNC: 108 MMOL/L (ref 94–109)
CHOLEST SERPL-MCNC: 177 MG/DL
CO2 SERPL-SCNC: 24 MMOL/L (ref 20–32)
CREAT SERPL-MCNC: 1.03 MG/DL (ref 0.66–1.25)
FASTING STATUS PATIENT QL REPORTED: YES
GFR SERPL CREATININE-BSD FRML MDRD: 88 ML/MIN/1.73M2
GLUCOSE BLD-MCNC: 104 MG/DL (ref 70–99)
HBA1C MFR BLD: 6.5 % (ref 0–5.6)
HDLC SERPL-MCNC: 39 MG/DL
LDLC SERPL CALC-MCNC: 99 MG/DL
NONHDLC SERPL-MCNC: 138 MG/DL
POTASSIUM BLD-SCNC: 4.1 MMOL/L (ref 3.4–5.3)
PROT SERPL-MCNC: 8.6 G/DL (ref 6.8–8.8)
SODIUM SERPL-SCNC: 138 MMOL/L (ref 133–144)
TRIGL SERPL-MCNC: 194 MG/DL

## 2022-10-18 PROCEDURE — 36415 COLL VENOUS BLD VENIPUNCTURE: CPT | Performed by: INTERNAL MEDICINE

## 2022-10-18 PROCEDURE — 80053 COMPREHEN METABOLIC PANEL: CPT | Performed by: INTERNAL MEDICINE

## 2022-10-18 PROCEDURE — 99207 PR FOOT EXAM NO CHARGE: CPT | Performed by: INTERNAL MEDICINE

## 2022-10-18 PROCEDURE — 99214 OFFICE O/P EST MOD 30 MIN: CPT | Performed by: INTERNAL MEDICINE

## 2022-10-18 PROCEDURE — 80061 LIPID PANEL: CPT | Performed by: INTERNAL MEDICINE

## 2022-10-18 PROCEDURE — 83036 HEMOGLOBIN GLYCOSYLATED A1C: CPT | Performed by: INTERNAL MEDICINE

## 2022-10-18 RX ORDER — FINASTERIDE 5 MG/1
5 TABLET, FILM COATED ORAL AT BEDTIME
Qty: 90 TABLET | Refills: 3 | Status: SHIPPED | OUTPATIENT
Start: 2022-10-18

## 2022-10-18 RX ORDER — OMEPRAZOLE 40 MG/1
40 CAPSULE, DELAYED RELEASE ORAL AT BEDTIME
Qty: 90 CAPSULE | Refills: 3 | Status: SHIPPED | OUTPATIENT
Start: 2022-10-18 | End: 2023-10-19

## 2022-10-18 RX ORDER — FLUTICASONE PROPIONATE 50 MCG
1 SPRAY, SUSPENSION (ML) NASAL DAILY PRN
Qty: 18 G | Refills: 3 | Status: SHIPPED | OUTPATIENT
Start: 2022-10-18

## 2022-10-18 RX ORDER — ATORVASTATIN CALCIUM 20 MG/1
20 TABLET, FILM COATED ORAL DAILY
Qty: 90 TABLET | Refills: 3 | Status: SHIPPED | OUTPATIENT
Start: 2022-10-18

## 2022-10-18 RX ORDER — CITALOPRAM HYDROBROMIDE 40 MG/1
40 TABLET ORAL AT BEDTIME
Qty: 90 TABLET | Refills: 3 | Status: SHIPPED | OUTPATIENT
Start: 2022-10-18 | End: 2023-10-26

## 2022-10-18 RX ORDER — TADALAFIL 10 MG/1
10 TABLET ORAL DAILY
Qty: 30 TABLET | Refills: 0 | Status: SHIPPED | OUTPATIENT
Start: 2022-10-18 | End: 2022-11-16

## 2022-10-18 RX ORDER — LISINOPRIL AND HYDROCHLOROTHIAZIDE 12.5; 2 MG/1; MG/1
1 TABLET ORAL DAILY
Qty: 90 TABLET | Refills: 3 | Status: SHIPPED | OUTPATIENT
Start: 2022-10-18

## 2022-10-18 ASSESSMENT — PATIENT HEALTH QUESTIONNAIRE - PHQ9
SUM OF ALL RESPONSES TO PHQ QUESTIONS 1-9: 2
10. IF YOU CHECKED OFF ANY PROBLEMS, HOW DIFFICULT HAVE THESE PROBLEMS MADE IT FOR YOU TO DO YOUR WORK, TAKE CARE OF THINGS AT HOME, OR GET ALONG WITH OTHER PEOPLE: NOT DIFFICULT AT ALL
SUM OF ALL RESPONSES TO PHQ QUESTIONS 1-9: 2

## 2022-10-18 ASSESSMENT — PAIN SCALES - GENERAL: PAINLEVEL: NO PAIN (0)

## 2022-10-18 NOTE — PROGRESS NOTES
Jordan Patricia is a 52 year old, presenting for the following health issues:  Diabetes      History of Present Illness       Diabetes:   He presents for follow up of diabetes.  He is checking home blood glucose one time daily. He checks blood glucose after meals and at bedtime.  Blood glucose is never over 200 and never under 70. He is aware of hypoglycemia symptoms including weakness. He has no concerns regarding his diabetes at this time.  He is not experiencing numbness or burning in feet, excessive thirst, blurry vision, weight changes or redness, sores or blisters on feet. The patient has had a diabetic eye exam in the last 12 months.     He exercises with enough effort to increase his heart rate 20 to 29 minutes per day.  He exercises with enough effort to increase his heart rate 5 days per week.   He is taking medications regularly.    Today's PHQ-9         PHQ-9 Total Score: 2    PHQ-9 Q9 Thoughts of better off dead/self-harm past 2 weeks :   Not at all    How difficult have these problems made it for you to do your work, take care of things at home, or get along with other people: Not difficult at all        EMR reviewed including:             Complaint, History of Chief Complaint, Corresponding Review of Systems, and Complaint Specific Physical Examination.    #1   Follow-up on type 2 diabetes  Taking metformin regularly.  Never filled prescription for Farxiga $$$  Denies polyuria or polydipsia  Denies paresthesias.  Denies skin lesions.  Denies ulceration.  Continues aspirin, statin, ACE inhibitor.        Exam:   LUNGS: clear bilaterally, airflow is brisk, no intercostal retraction or stridor is noted. No coughing is noted during visit.   HEART:  regular without rubs, clicks, gallops, or murmurs. PMI is nondisplaced. Upstrokes are brisk. S1,S2 are heard.   NEURO: Pt is alert and appropriate. No neurologic lateralization is noted. Cranial nerves 2-12 are intact. Peripheral sensory and motor  function are grossly normal.       #2   Persistent erectile dysfunction  Wishes to start daily Cialis instead of as needed Viagra  Able to initiate but not maintain        Exam:   GENITAL: Normal male genitalia is noted. Testicles are bilaterally present. No penile lesions are present.          #3   Follow-up on recurrent depression  Markedly improved  No suicidal ideation  Continues citalopram without side effects.  Denies any alcohol or drug use.        Exam:   PSYCH: The patient appears grossly appropriate. Maintains good eye contact, does not have any jittery or atypical motion. Displays appropriate affect.      #4   Follow-up on hypertension  Blood pressure well controlled at 138/86  Tolerates medication without difficulty  No syncope or near syncope  Denies chest pain        Exam:  As above      #5   BPH  Responding to low-dose medication  Patient currently taking 1.25 mg of finasteride with good results        Patient has been interviewed, applicable history and applied review of systems have been performed.    Vital Signs:   /86   Pulse 104   Temp 97.5  F (36.4  C) (Temporal)   Resp 20   Wt 119 kg (262 lb 4 oz)   SpO2 97%   BMI 32.07 kg/m        Decision Making    Problem and Complexity     1. Screen for colon cancer  Screening recommended.  Patient notes that he will not be back to this clinic as he is moving to another town.  - Colonoscopy Screening  Referral; Future    2. Type 2 diabetes mellitus with hyperglycemia, without long-term current use of insulin (H)  Check A1c, renal function and electrolytes.  Adjust medications accordingly  - HEMOGLOBIN A1C; Future  - Comprehensive metabolic panel (BMP + Alb, Alk Phos, ALT, AST, Total. Bili, TP); Future  - HEMOGLOBIN A1C  - Comprehensive metabolic panel (BMP + Alb, Alk Phos, ALT, AST, Total. Bili, TP)    3. Recurrent major depressive disorder, in partial remission (H)  Continue citalopram continue  - citalopram (CELEXA) 40 MG tablet; Take  1 tablet (40 mg) by mouth At Bedtime  Dispense: 90 tablet; Refill: 3    4. Male pattern baldness  Continue Proscar  - finasteride (PROSCAR) 5 MG tablet; Take 1 tablet (5 mg) by mouth At Bedtime  Dispense: 90 tablet; Refill: 3    5. Seasonal allergic rhinitis, unspecified trigger  Continue Flonase  - fluticasone (FLONASE) 50 MCG/ACT nasal spray; Spray 1 spray into both nostrils daily as needed for allergies  Dispense: 18 g; Refill: 3    6. Benign essential hypertension  Well-controlled.  Continue current medication  - lisinopril-hydrochlorothiazide (ZESTORETIC) 20-12.5 MG tablet; Take 1 tablet by mouth daily  Dispense: 90 tablet; Refill: 3    7. Benign prostatic hyperplasia with urinary frequency  Continue Proscar as above    8. Gastroesophageal reflux disease with esophagitis without hemorrhage  Marked improved with PPI usage  - omeprazole (PRILOSEC) 40 MG DR capsule; Take 1 capsule (40 mg) by mouth At Bedtime  Dispense: 90 capsule; Refill: 3    9. Erectile dysfunction, unspecified erectile dysfunction type  Switch to Cialis if financially feasible.  Patient will notify me of his results  - tadalafil (CIALIS) 10 MG tablet; Take 1 tablet (10 mg) by mouth daily  Dispense: 30 tablet; Refill: 0                                FOLLOW UP   I have asked the patient to make an appointment for followup with either:  1.  Me,  2.  The patient's preferred provider,  3.  Any available provider  In 4 months        Regarding routine vaccinations:  I have reviewed the patient's vaccination schedule and discussed the benefits of prophylactic vaccination in detail.  I recommend the patient contact their pharmacist (not the pharmacy within the clinic) for vaccinations.  Discussed that most insurance companies now favor reimbursement to the pharmacies and it will financially behoove the patient to have vaccinations performed at their pharmacy.        I have carefully explained the diagnosis and treatment options to the patient.  The  patient has displayed an understanding of the above, and all subsequent questions were answered.      DO JUAN Mas    Portions of this note were produced using [employer]  Although every attempt at real-time proof reading has been made, occasional grammar/syntax errors may have been missed.

## 2022-10-19 RX ORDER — CITALOPRAM HYDROBROMIDE 40 MG/1
40 TABLET ORAL AT BEDTIME
Qty: 90 TABLET | Refills: 3 | OUTPATIENT
Start: 2022-10-19

## 2022-10-19 RX ORDER — OMEPRAZOLE 40 MG/1
40 CAPSULE, DELAYED RELEASE ORAL AT BEDTIME
Qty: 90 CAPSULE | Refills: 3 | OUTPATIENT
Start: 2022-10-19

## 2022-10-19 RX ORDER — LISINOPRIL AND HYDROCHLOROTHIAZIDE 12.5; 2 MG/1; MG/1
1 TABLET ORAL DAILY
Qty: 90 TABLET | Refills: 3 | OUTPATIENT
Start: 2022-10-19

## 2022-11-14 DIAGNOSIS — N52.9 ERECTILE DYSFUNCTION, UNSPECIFIED ERECTILE DYSFUNCTION TYPE: ICD-10-CM

## 2022-11-15 NOTE — TELEPHONE ENCOUNTER
"Requested Prescriptions   Pending Prescriptions Disp Refills    tadalafil (CIALIS) 10 MG tablet 30 tablet 0     Sig: Take 1 tablet (10 mg) by mouth daily       Erectile Dysfuction Protocol Failed - 11/14/2022 11:28 AM        Failed - Absence of Alpha Blockers on Med list        Passed - Absence of nitrates on medication list        Passed - Recent (12 mo) or future (30 days) visit within the authorizing provider's specialty     Patient has had an office visit with the authorizing provider or a provider within the authorizing providers department within the previous 12 mos or has a future within next 30 days. See \"Patient Info\" tab in inbasket, or \"Choose Columns\" in Meds & Orders section of the refill encounter.              Passed - Medication is active on med list        Passed - Patient is age 18 or older               CAMILLA Curran, RN          "

## 2022-11-16 RX ORDER — TADALAFIL 10 MG/1
10 TABLET ORAL DAILY
Qty: 30 TABLET | Refills: 0 | Status: SHIPPED | OUTPATIENT
Start: 2022-11-16 | End: 2022-12-16

## 2022-12-14 DIAGNOSIS — N52.9 ERECTILE DYSFUNCTION, UNSPECIFIED ERECTILE DYSFUNCTION TYPE: ICD-10-CM

## 2022-12-16 RX ORDER — TADALAFIL 10 MG/1
10 TABLET ORAL DAILY
Qty: 30 TABLET | Refills: 0 | Status: SHIPPED | OUTPATIENT
Start: 2022-12-16 | End: 2023-01-10

## 2022-12-16 NOTE — TELEPHONE ENCOUNTER
Routing refill request to provider for review/approval because:    Requested Prescriptions   Pending Prescriptions Disp Refills    tadalafil (CIALIS) 10 MG tablet 30 tablet 0     Sig: Take 1 tablet (10 mg) by mouth daily       Erectile Dysfuction Protocol Failed - 12/14/2022 11:40 AM        Failed - Absence of Alpha Blockers on Med list

## 2022-12-23 DIAGNOSIS — N52.9 ERECTILE DYSFUNCTION, UNSPECIFIED ERECTILE DYSFUNCTION TYPE: ICD-10-CM

## 2022-12-26 RX ORDER — TADALAFIL 10 MG/1
TABLET ORAL
Qty: 30 TABLET | Refills: 0 | OUTPATIENT
Start: 2022-12-26

## 2023-01-02 ENCOUNTER — MYC REFILL (OUTPATIENT)
Dept: INTERNAL MEDICINE | Facility: CLINIC | Age: 53
End: 2023-01-02

## 2023-01-02 DIAGNOSIS — N52.9 ERECTILE DYSFUNCTION, UNSPECIFIED ERECTILE DYSFUNCTION TYPE: ICD-10-CM

## 2023-01-04 RX ORDER — TADALAFIL 10 MG/1
10 TABLET ORAL DAILY
Qty: 30 TABLET | Refills: 0 | OUTPATIENT
Start: 2023-01-04

## 2023-01-09 ENCOUNTER — MYC MEDICAL ADVICE (OUTPATIENT)
Dept: INTERNAL MEDICINE | Facility: CLINIC | Age: 53
End: 2023-01-09

## 2023-01-09 DIAGNOSIS — N52.9 ERECTILE DYSFUNCTION, UNSPECIFIED ERECTILE DYSFUNCTION TYPE: ICD-10-CM

## 2023-01-11 RX ORDER — TADALAFIL 10 MG/1
10 TABLET ORAL DAILY PRN
Qty: 30 TABLET | Refills: 0 | Status: SHIPPED | OUTPATIENT
Start: 2023-01-11

## 2023-01-23 ENCOUNTER — HEALTH MAINTENANCE LETTER (OUTPATIENT)
Age: 53
End: 2023-01-23

## 2023-01-23 DIAGNOSIS — F33.41 RECURRENT MAJOR DEPRESSIVE DISORDER, IN PARTIAL REMISSION (H): ICD-10-CM

## 2023-01-25 RX ORDER — CITALOPRAM HYDROBROMIDE 40 MG/1
40 TABLET ORAL AT BEDTIME
Qty: 90 TABLET | Refills: 3 | OUTPATIENT
Start: 2023-01-25

## 2023-02-07 ENCOUNTER — TELEPHONE (OUTPATIENT)
Dept: INTERNAL MEDICINE | Facility: CLINIC | Age: 53
End: 2023-02-07

## 2023-02-07 DIAGNOSIS — F33.41 RECURRENT MAJOR DEPRESSIVE DISORDER, IN PARTIAL REMISSION (H): ICD-10-CM

## 2023-05-06 ENCOUNTER — HEALTH MAINTENANCE LETTER (OUTPATIENT)
Age: 53
End: 2023-05-06

## 2023-10-07 ENCOUNTER — HEALTH MAINTENANCE LETTER (OUTPATIENT)
Age: 53
End: 2023-10-07

## 2023-10-19 DIAGNOSIS — K21.00 GASTROESOPHAGEAL REFLUX DISEASE WITH ESOPHAGITIS WITHOUT HEMORRHAGE: ICD-10-CM

## 2023-10-19 RX ORDER — OMEPRAZOLE 40 MG/1
40 CAPSULE, DELAYED RELEASE ORAL AT BEDTIME
Qty: 90 CAPSULE | Refills: 0 | Status: SHIPPED | OUTPATIENT
Start: 2023-10-19 | End: 2024-01-15

## 2023-10-26 DIAGNOSIS — F33.41 RECURRENT MAJOR DEPRESSIVE DISORDER, IN PARTIAL REMISSION (H): ICD-10-CM

## 2023-10-26 RX ORDER — CITALOPRAM HYDROBROMIDE 40 MG/1
40 TABLET ORAL AT BEDTIME
Qty: 90 TABLET | Refills: 0 | Status: SHIPPED | OUTPATIENT
Start: 2023-10-26 | End: 2024-01-25

## 2023-10-26 NOTE — TELEPHONE ENCOUNTER
The patient will need to set up a face-to-face appointment with me prior to any subsequent refills.    Please help the patient set up an appointment.    Thank you.  [place]

## 2024-01-15 DIAGNOSIS — K21.00 GASTROESOPHAGEAL REFLUX DISEASE WITH ESOPHAGITIS WITHOUT HEMORRHAGE: ICD-10-CM

## 2024-01-15 RX ORDER — OMEPRAZOLE 40 MG/1
40 CAPSULE, DELAYED RELEASE ORAL AT BEDTIME
Qty: 90 CAPSULE | Refills: 0 | Status: SHIPPED | OUTPATIENT
Start: 2024-01-15

## 2024-01-25 DIAGNOSIS — F33.41 RECURRENT MAJOR DEPRESSIVE DISORDER, IN PARTIAL REMISSION (H): ICD-10-CM

## 2024-01-25 RX ORDER — CITALOPRAM HYDROBROMIDE 40 MG/1
40 TABLET ORAL AT BEDTIME
Qty: 90 TABLET | Refills: 0 | Status: SHIPPED | OUTPATIENT
Start: 2024-01-25 | End: 2024-04-23

## 2024-02-24 ENCOUNTER — HEALTH MAINTENANCE LETTER (OUTPATIENT)
Age: 54
End: 2024-02-24

## 2024-04-17 DIAGNOSIS — K21.00 GASTROESOPHAGEAL REFLUX DISEASE WITH ESOPHAGITIS WITHOUT HEMORRHAGE: ICD-10-CM

## 2024-04-17 RX ORDER — OMEPRAZOLE 40 MG/1
40 CAPSULE, DELAYED RELEASE ORAL AT BEDTIME
Qty: 90 CAPSULE | Refills: 0 | OUTPATIENT
Start: 2024-04-17

## 2024-04-23 DIAGNOSIS — F33.41 RECURRENT MAJOR DEPRESSIVE DISORDER, IN PARTIAL REMISSION (H): ICD-10-CM

## 2024-04-23 RX ORDER — CITALOPRAM HYDROBROMIDE 40 MG/1
40 TABLET ORAL
Qty: 90 TABLET | Refills: 0 | Status: SHIPPED | OUTPATIENT
Start: 2024-04-23 | End: 2024-07-26

## 2024-07-13 ENCOUNTER — HEALTH MAINTENANCE LETTER (OUTPATIENT)
Age: 54
End: 2024-07-13

## 2024-07-26 DIAGNOSIS — F33.41 RECURRENT MAJOR DEPRESSIVE DISORDER, IN PARTIAL REMISSION (H): ICD-10-CM

## 2024-07-26 RX ORDER — CITALOPRAM HYDROBROMIDE 40 MG/1
40 TABLET ORAL
Qty: 90 TABLET | Refills: 0 | Status: SHIPPED | OUTPATIENT
Start: 2024-07-26

## 2024-10-26 DIAGNOSIS — F33.41 RECURRENT MAJOR DEPRESSIVE DISORDER, IN PARTIAL REMISSION (H): ICD-10-CM

## 2024-10-29 RX ORDER — CITALOPRAM HYDROBROMIDE 40 MG/1
40 TABLET ORAL
Qty: 90 TABLET | Refills: 0 | Status: SHIPPED | OUTPATIENT
Start: 2024-10-29

## 2024-11-30 ENCOUNTER — HEALTH MAINTENANCE LETTER (OUTPATIENT)
Age: 54
End: 2024-11-30

## 2025-03-09 ENCOUNTER — HEALTH MAINTENANCE LETTER (OUTPATIENT)
Age: 55
End: 2025-03-09

## 2025-06-28 ENCOUNTER — HEALTH MAINTENANCE LETTER (OUTPATIENT)
Age: 55
End: 2025-06-28

## (undated) DEVICE — ENDO TROCAR FIRST ENTRY KII FIOS Z-THRD 05X100MM CTF03

## (undated) DEVICE — SU MONOCRYL 4-0 PS-2 18" UND Y496G

## (undated) DEVICE — NDL INSUFFLATION 13GA 120MM C2201

## (undated) DEVICE — DEVICE SUTURE GRASPER TROCAR CLOSURE 14GA PMITCSG

## (undated) DEVICE — ADH SKIN CLOSURE PREMIERPRO EXOFIN 1.0ML 3470

## (undated) DEVICE — ENDO POUCH UNIVERSAL RETRIEVAL SYSTEM INZII 5MM CD003

## (undated) DEVICE — ENDO TROCAR SLEEVE KII Z-THREADED 05X100MM CTS02

## (undated) DEVICE — GLOVE ESTEEM BLUE W/NEU-THERA 8.0  2D73PB80

## (undated) DEVICE — SOL WATER IRRIG 1000ML BOTTLE 07139-09

## (undated) DEVICE — OPTIFIX ABSORBABLE FIXATION SYSTEM

## (undated) DEVICE — PACK GENERAL LAPAOSCOPY

## (undated) DEVICE — GLOVE ESTEEM POWDER FREE 7.5  2D72PL75

## (undated) DEVICE — SOL NACL 0.9% IRRIG 1000ML BOTTLE 07138-09

## (undated) RX ORDER — GLYCOPYRROLATE 0.2 MG/ML
INJECTION INTRAMUSCULAR; INTRAVENOUS
Status: DISPENSED
Start: 2019-11-14

## (undated) RX ORDER — BUPIVACAINE HYDROCHLORIDE 2.5 MG/ML
INJECTION, SOLUTION EPIDURAL; INFILTRATION; INTRACAUDAL
Status: DISPENSED
Start: 2019-11-14

## (undated) RX ORDER — ONDANSETRON 2 MG/ML
INJECTION INTRAMUSCULAR; INTRAVENOUS
Status: DISPENSED
Start: 2019-11-14

## (undated) RX ORDER — EPINEPHRINE 1 MG/ML
INJECTION, SOLUTION INTRAMUSCULAR; SUBCUTANEOUS
Status: DISPENSED
Start: 2019-11-14

## (undated) RX ORDER — HYDROMORPHONE HYDROCHLORIDE 1 MG/ML
INJECTION, SOLUTION INTRAMUSCULAR; INTRAVENOUS; SUBCUTANEOUS
Status: DISPENSED
Start: 2019-11-14

## (undated) RX ORDER — LIDOCAINE HYDROCHLORIDE 20 MG/ML
INJECTION, SOLUTION EPIDURAL; INFILTRATION; INTRACAUDAL; PERINEURAL
Status: DISPENSED
Start: 2019-11-14

## (undated) RX ORDER — DIMENHYDRINATE 50 MG/ML
INJECTION, SOLUTION INTRAMUSCULAR; INTRAVENOUS
Status: DISPENSED
Start: 2019-11-14

## (undated) RX ORDER — DEXAMETHASONE SODIUM PHOSPHATE 10 MG/ML
INJECTION, SOLUTION INTRAMUSCULAR; INTRAVENOUS
Status: DISPENSED
Start: 2019-11-14

## (undated) RX ORDER — FENTANYL CITRATE 50 UG/ML
INJECTION, SOLUTION INTRAMUSCULAR; INTRAVENOUS
Status: DISPENSED
Start: 2019-11-14

## (undated) RX ORDER — PROPOFOL 10 MG/ML
INJECTION, EMULSION INTRAVENOUS
Status: DISPENSED
Start: 2019-11-14